# Patient Record
Sex: MALE | Race: WHITE | NOT HISPANIC OR LATINO | Employment: OTHER | URBAN - METROPOLITAN AREA
[De-identification: names, ages, dates, MRNs, and addresses within clinical notes are randomized per-mention and may not be internally consistent; named-entity substitution may affect disease eponyms.]

---

## 2017-12-13 ENCOUNTER — HISTORICAL (OUTPATIENT)
Dept: RADIOLOGY | Facility: HOSPITAL | Age: 67
End: 2017-12-13

## 2017-12-13 LAB
ALBUMIN SERPL-MCNC: 4.3 GM/DL (ref 3.4–5)
ALBUMIN/GLOB SERPL: 1.1 RATIO (ref 1.1–2)
ALP SERPL-CCNC: 120 UNIT/L (ref 46–116)
ALT SERPL-CCNC: 28 UNIT/L (ref 12–78)
APPEARANCE, UA: CLEAR
AST SERPL-CCNC: 20 UNIT/L (ref 15–37)
BACTERIA #/AREA URNS AUTO: NORMAL /HPF
BILIRUB SERPL-MCNC: 0.4 MG/DL (ref 0.2–1)
BILIRUB UR QL STRIP: NEGATIVE
BILIRUBIN DIRECT+TOT PNL SERPL-MCNC: 0.11 MG/DL (ref 0–0.2)
BILIRUBIN DIRECT+TOT PNL SERPL-MCNC: 0.28 MG/DL (ref 0–0.8)
BUN SERPL-MCNC: 14.9 MG/DL (ref 7–18)
CALCIUM SERPL-MCNC: 10 MG/DL (ref 8.5–10.1)
CHLORIDE SERPL-SCNC: 101 MMOL/L (ref 98–107)
CHOLEST SERPL-MCNC: 137 MG/DL (ref 0–200)
CHOLEST/HDLC SERPL: 2.2 {RATIO} (ref 0–5)
CO2 SERPL-SCNC: 29.2 MMOL/L (ref 21–32)
COLOR UR: YELLOW
CREAT SERPL-MCNC: 0.98 MG/DL (ref 0.6–1.3)
ERYTHROCYTE [DISTWIDTH] IN BLOOD BY AUTOMATED COUNT: 14.3 % (ref 11.5–17)
GLOBULIN SER-MCNC: 3.8 GM/DL (ref 2.4–3.5)
GLUCOSE (UA): NEGATIVE
GLUCOSE SERPL-MCNC: 167 MG/DL (ref 74–106)
HCT VFR BLD AUTO: 39.2 % (ref 42–52)
HDLC SERPL-MCNC: 63 MG/DL (ref 40–60)
HGB BLD-MCNC: 13.1 GM/DL (ref 14–18)
HGB UR QL STRIP: NEGATIVE
KETONES UR QL STRIP: NEGATIVE
LDLC SERPL CALC-MCNC: 61 MG/DL (ref 0–129)
LEUKOCYTE ESTERASE UR QL STRIP: NEGATIVE
MCH RBC QN AUTO: 28.1 PG (ref 27–31)
MCHC RBC AUTO-ENTMCNC: 33.5 GM/DL (ref 33–36)
MCV RBC AUTO: 83.9 FL (ref 80–94)
NITRITE UR QL STRIP.AUTO: NEGATIVE
PH UR STRIP: 7 [PH] (ref 5–9)
PLATELET # BLD AUTO: 297 X10(3)/MCL (ref 130–400)
PMV BLD AUTO: 6.9 FL (ref 7.4–10.4)
POTASSIUM SERPL-SCNC: 5.1 MMOL/L (ref 3.5–5.1)
PROT SERPL-MCNC: 8.1 GM/DL (ref 6.4–8.2)
PROT UR QL STRIP: NEGATIVE
PSA SERPL-MCNC: 1.61 NG/ML (ref 0–4)
RBC # BLD AUTO: 4.67 X10(6)/MCL (ref 4.7–6.1)
RBC #/AREA URNS HPF: NORMAL /[HPF]
SODIUM SERPL-SCNC: 139 MMOL/L (ref 136–145)
SP GR UR STRIP: 1.02 (ref 1–1.03)
SQUAMOUS EPITHELIAL, UA: NORMAL
TRIGL SERPL-MCNC: 67 MG/DL
TSH SERPL-ACNC: 1.94 MIU/ML (ref 0.36–3.74)
UROBILINOGEN UR STRIP-ACNC: 0.2
VLDLC SERPL CALC-MCNC: 13 MG/DL
WBC # SPEC AUTO: 7.9 X10(3)/MCL (ref 4.5–11.5)
WBC #/AREA URNS AUTO: NORMAL /[HPF]

## 2018-04-11 ENCOUNTER — HISTORICAL (OUTPATIENT)
Dept: LAB | Facility: HOSPITAL | Age: 68
End: 2018-04-11

## 2018-04-11 LAB
BUN SERPL-MCNC: 13.6 MG/DL (ref 7–18)
CALCIUM SERPL-MCNC: 9 MG/DL (ref 8.5–10.1)
CHLORIDE SERPL-SCNC: 94 MMOL/L (ref 98–107)
CO2 SERPL-SCNC: 29.3 MMOL/L (ref 21–32)
CREAT SERPL-MCNC: 0.97 MG/DL (ref 0.6–1.3)
CREAT/UREA NIT SERPL: 14
EST. AVERAGE GLUCOSE BLD GHB EST-MCNC: 183 MG/DL
GLUCOSE SERPL-MCNC: 179 MG/DL (ref 74–106)
HBA1C MFR BLD: 8 % (ref 4.5–6.2)
POTASSIUM SERPL-SCNC: 5.1 MMOL/L (ref 3.5–5.1)
SODIUM SERPL-SCNC: 132 MMOL/L (ref 136–145)

## 2018-07-27 ENCOUNTER — HISTORICAL (OUTPATIENT)
Dept: RADIOLOGY | Facility: HOSPITAL | Age: 68
End: 2018-07-27

## 2018-12-17 ENCOUNTER — HISTORICAL (OUTPATIENT)
Dept: LAB | Facility: HOSPITAL | Age: 68
End: 2018-12-17

## 2018-12-17 LAB
ALBUMIN SERPL-MCNC: 3.6 GM/DL (ref 3.4–5)
ALBUMIN/GLOB SERPL: 0.8 RATIO (ref 1.1–2)
ALP SERPL-CCNC: 88 UNIT/L (ref 46–116)
ALT SERPL-CCNC: 25 UNIT/L (ref 12–78)
AST SERPL-CCNC: 16 UNIT/L (ref 15–37)
BILIRUB SERPL-MCNC: 0.2 MG/DL (ref 0.2–1)
BILIRUBIN DIRECT+TOT PNL SERPL-MCNC: 0.09 MG/DL (ref 0–0.2)
BILIRUBIN DIRECT+TOT PNL SERPL-MCNC: 0.11 MG/DL (ref 0–0.8)
BUN SERPL-MCNC: 26.8 MG/DL (ref 7–18)
CALCIUM SERPL-MCNC: 9.7 MG/DL (ref 8.5–10.1)
CHLORIDE SERPL-SCNC: 95 MMOL/L (ref 98–107)
CO2 SERPL-SCNC: 27.7 MMOL/L (ref 21–32)
CREAT SERPL-MCNC: 1.28 MG/DL (ref 0.6–1.3)
EST. AVERAGE GLUCOSE BLD GHB EST-MCNC: 263 MG/DL
GLOBULIN SER-MCNC: 4.5 GM/DL (ref 2.4–3.5)
GLUCOSE SERPL-MCNC: 500 MG/DL (ref 74–106)
HBA1C MFR BLD: 10.8 % (ref 4.5–6.2)
POTASSIUM SERPL-SCNC: 4.7 MMOL/L (ref 3.5–5.1)
PROT SERPL-MCNC: 8.1 GM/DL (ref 6.4–8.2)
SODIUM SERPL-SCNC: 132 MMOL/L (ref 136–145)

## 2019-09-17 ENCOUNTER — HISTORICAL (OUTPATIENT)
Dept: LAB | Facility: HOSPITAL | Age: 69
End: 2019-09-17

## 2019-09-17 LAB
ALBUMIN SERPL-MCNC: 4.1 GM/DL (ref 3.4–5)
ALBUMIN/GLOB SERPL: 1.1 RATIO (ref 1.1–2)
ALP SERPL-CCNC: 78 UNIT/L (ref 46–116)
ALT SERPL-CCNC: 18 UNIT/L (ref 12–78)
AST SERPL-CCNC: 16 UNIT/L (ref 15–37)
BILIRUB SERPL-MCNC: 0.4 MG/DL (ref 0.2–1)
BILIRUBIN DIRECT+TOT PNL SERPL-MCNC: 0.1 MG/DL (ref 0–0.2)
BILIRUBIN DIRECT+TOT PNL SERPL-MCNC: 0.3 MG/DL (ref 0–0.8)
BUN SERPL-MCNC: 15.4 MG/DL (ref 7–18)
CALCIUM SERPL-MCNC: 9.2 MG/DL (ref 8.5–10.1)
CHLORIDE SERPL-SCNC: 98 MMOL/L (ref 98–107)
CHOLEST SERPL-MCNC: 146 MG/DL (ref 0–200)
CHOLEST/HDLC SERPL: 2.9 {RATIO} (ref 0–5)
CO2 SERPL-SCNC: 28.1 MMOL/L (ref 21–32)
CREAT SERPL-MCNC: 1.02 MG/DL (ref 0.6–1.3)
EST. AVERAGE GLUCOSE BLD GHB EST-MCNC: 169 MG/DL
GLOBULIN SER-MCNC: 3.7 GM/DL (ref 2.4–3.5)
GLUCOSE SERPL-MCNC: 175 MG/DL (ref 74–106)
HBA1C MFR BLD: 7.5 % (ref 4.5–6.2)
HDLC SERPL-MCNC: 51 MG/DL (ref 40–60)
LDLC SERPL CALC-MCNC: 83 MG/DL (ref 0–129)
POTASSIUM SERPL-SCNC: 4.9 MMOL/L (ref 3.5–5.1)
PROT SERPL-MCNC: 7.8 GM/DL (ref 6.4–8.2)
SODIUM SERPL-SCNC: 136 MMOL/L (ref 136–145)
TRIGL SERPL-MCNC: 58 MG/DL
VLDLC SERPL CALC-MCNC: 12 MG/DL

## 2020-12-14 ENCOUNTER — HISTORICAL (OUTPATIENT)
Dept: ADMINISTRATIVE | Facility: HOSPITAL | Age: 70
End: 2020-12-14

## 2020-12-14 LAB
ALBUMIN SERPL-MCNC: 3.9 GM/DL (ref 3.4–4.8)
ALBUMIN/GLOB SERPL: 1.1 RATIO (ref 1.1–2)
ALP SERPL-CCNC: 57 UNIT/L (ref 40–150)
ALT SERPL-CCNC: 12 UNIT/L (ref 0–55)
APPEARANCE, UA: CLEAR
AST SERPL-CCNC: 15 UNIT/L (ref 5–34)
BACTERIA SPEC CULT: NORMAL
BILIRUB SERPL-MCNC: 0.3 MG/DL
BILIRUB UR QL STRIP: NEGATIVE
BILIRUBIN DIRECT+TOT PNL SERPL-MCNC: 0.1 MG/DL (ref 0–0.5)
BILIRUBIN DIRECT+TOT PNL SERPL-MCNC: 0.2 MG/DL (ref 0–0.8)
BUN SERPL-MCNC: 21.7 MG/DL (ref 8.4–25.7)
CALCIUM SERPL-MCNC: 9.5 MG/DL (ref 8.8–10)
CHLORIDE SERPL-SCNC: 101 MMOL/L (ref 98–107)
CHOLEST SERPL-MCNC: 140 MG/DL
CHOLEST/HDLC SERPL: 2 {RATIO} (ref 0–5)
CO2 SERPL-SCNC: 23 MMOL/L (ref 23–31)
COLOR UR: YELLOW
CREAT SERPL-MCNC: 0.96 MG/DL (ref 0.73–1.18)
ERYTHROCYTE [DISTWIDTH] IN BLOOD BY AUTOMATED COUNT: 13.4 % (ref 11.5–17)
EST. AVERAGE GLUCOSE BLD GHB EST-MCNC: 145.6 MG/DL
GLOBULIN SER-MCNC: 3.5 GM/DL (ref 2.4–3.5)
GLUCOSE (UA): NEGATIVE
GLUCOSE SERPL-MCNC: 104 MG/DL (ref 82–115)
HBA1C MFR BLD: 6.7 %
HCT VFR BLD AUTO: 41.6 % (ref 42–52)
HDLC SERPL-MCNC: 57 MG/DL (ref 35–60)
HGB BLD-MCNC: 13.7 GM/DL (ref 14–18)
HGB UR QL STRIP: NEGATIVE
KETONES UR QL STRIP: NEGATIVE
LDLC SERPL CALC-MCNC: 67 MG/DL (ref 50–140)
LEUKOCYTE ESTERASE UR QL STRIP: NEGATIVE
MCH RBC QN AUTO: 28.1 PG (ref 27–31)
MCHC RBC AUTO-ENTMCNC: 32.9 GM/DL (ref 33–36)
MCV RBC AUTO: 85.4 FL (ref 80–94)
NITRITE UR QL STRIP: NEGATIVE
PH UR STRIP: 5.5 [PH] (ref 5–9)
PLATELET # BLD AUTO: 312 X10(3)/MCL (ref 130–400)
PMV BLD AUTO: 9.4 FL (ref 9.4–12.4)
POTASSIUM SERPL-SCNC: 5.1 MMOL/L (ref 3.5–5.1)
PROT SERPL-MCNC: 7.4 GM/DL (ref 5.8–7.6)
PROT UR QL STRIP: NEGATIVE
PSA SERPL-MCNC: 1.19 NG/ML
RBC # BLD AUTO: 4.87 X10(6)/MCL (ref 4.7–6.1)
RBC #/AREA URNS HPF: NORMAL /[HPF]
SODIUM SERPL-SCNC: 134 MMOL/L (ref 136–145)
SP GR UR STRIP: 1.02 (ref 1–1.03)
SQUAMOUS EPITHELIAL, UA: NORMAL
TRIGL SERPL-MCNC: 80 MG/DL (ref 34–140)
TSH SERPL-ACNC: 2.35 UIU/ML (ref 0.35–4.94)
UROBILINOGEN UR STRIP-ACNC: 0.2
VLDLC SERPL CALC-MCNC: 16 MG/DL
WBC # SPEC AUTO: 9.8 X10(3)/MCL (ref 4.5–11.5)
WBC #/AREA URNS HPF: NORMAL /[HPF]

## 2021-01-11 ENCOUNTER — HISTORICAL (OUTPATIENT)
Dept: ADMINISTRATIVE | Facility: HOSPITAL | Age: 71
End: 2021-01-11

## 2021-01-11 LAB
ALBUMIN SERPL-MCNC: 4.5 GM/DL (ref 3.4–4.8)
ALBUMIN/GLOB SERPL: 1.5 RATIO (ref 1.1–2)
ALP SERPL-CCNC: 63 UNIT/L (ref 40–150)
ALT SERPL-CCNC: 14 UNIT/L (ref 0–55)
AST SERPL-CCNC: 13 UNIT/L (ref 5–34)
BILIRUB SERPL-MCNC: 0.4 MG/DL
BILIRUBIN DIRECT+TOT PNL SERPL-MCNC: 0.2 MG/DL (ref 0–0.5)
BILIRUBIN DIRECT+TOT PNL SERPL-MCNC: 0.2 MG/DL (ref 0–0.8)
BUN SERPL-MCNC: 28.7 MG/DL (ref 8.4–25.7)
CALCIUM SERPL-MCNC: 9.3 MG/DL (ref 8.8–10)
CHLORIDE SERPL-SCNC: 100 MMOL/L (ref 98–107)
CO2 SERPL-SCNC: 25 MMOL/L (ref 23–31)
CREAT SERPL-MCNC: 1.49 MG/DL (ref 0.73–1.18)
GLOBULIN SER-MCNC: 3.1 GM/DL (ref 2.4–3.5)
GLUCOSE SERPL-MCNC: 165 MG/DL (ref 82–115)
POTASSIUM SERPL-SCNC: 5.5 MMOL/L (ref 3.5–5.1)
PROT SERPL-MCNC: 7.6 GM/DL (ref 5.8–7.6)
SODIUM SERPL-SCNC: 135 MMOL/L (ref 136–145)

## 2021-01-21 ENCOUNTER — HISTORICAL (OUTPATIENT)
Dept: ADMINISTRATIVE | Facility: HOSPITAL | Age: 71
End: 2021-01-21

## 2021-01-21 LAB
ALBUMIN SERPL-MCNC: 3.7 GM/DL (ref 3.4–4.8)
ALBUMIN/GLOB SERPL: 1.3 RATIO (ref 1.1–2)
ALP SERPL-CCNC: 49 UNIT/L (ref 40–150)
ALT SERPL-CCNC: 13 UNIT/L (ref 0–55)
AST SERPL-CCNC: 13 UNIT/L (ref 5–34)
BILIRUB SERPL-MCNC: 0.4 MG/DL
BILIRUBIN DIRECT+TOT PNL SERPL-MCNC: 0.2 MG/DL (ref 0–0.5)
BILIRUBIN DIRECT+TOT PNL SERPL-MCNC: 0.2 MG/DL (ref 0–0.8)
BUN SERPL-MCNC: 18 MG/DL (ref 8.4–25.7)
CALCIUM SERPL-MCNC: 8.8 MG/DL (ref 8.8–10)
CHLORIDE SERPL-SCNC: 88 MMOL/L (ref 98–107)
CO2 SERPL-SCNC: 25 MMOL/L (ref 23–31)
CREAT SERPL-MCNC: 1.01 MG/DL (ref 0.73–1.18)
GLOBULIN SER-MCNC: 2.8 GM/DL (ref 2.4–3.5)
GLUCOSE SERPL-MCNC: 126 MG/DL (ref 82–115)
POTASSIUM SERPL-SCNC: 5.3 MMOL/L (ref 3.5–5.1)
PROT SERPL-MCNC: 6.5 GM/DL (ref 5.8–7.6)
SODIUM SERPL-SCNC: 120 MMOL/L (ref 136–145)

## 2022-02-09 ENCOUNTER — HISTORICAL (OUTPATIENT)
Dept: LAB | Facility: HOSPITAL | Age: 72
End: 2022-02-09

## 2022-02-09 LAB
ALBUMIN SERPL-MCNC: 4.1 G/DL (ref 3.4–4.8)
ALBUMIN/GLOB SERPL: 1.2 {RATIO} (ref 1.1–2)
ALP SERPL-CCNC: 79 U/L (ref 40–150)
ALT SERPL-CCNC: 19 U/L (ref 0–55)
APPEARANCE, UA: CLEAR
AST SERPL-CCNC: 17 U/L (ref 5–34)
BACTERIA SPEC CULT: NORMAL
BILIRUB SERPL-MCNC: 0.4 MG/DL
BILIRUB UR QL STRIP: NEGATIVE
BILIRUBIN DIRECT+TOT PNL SERPL-MCNC: 0.2 (ref 0–0.5)
BILIRUBIN DIRECT+TOT PNL SERPL-MCNC: 0.2 (ref 0–0.8)
BUN SERPL-MCNC: 16.8 MG/DL (ref 8.4–25.7)
CALCIUM SERPL-MCNC: 10 MG/DL (ref 8.7–10.5)
CHLORIDE SERPL-SCNC: 97 MMOL/L (ref 98–107)
CHOLEST SERPL-MCNC: 165 MG/DL
CHOLEST/HDLC SERPL: 3 {RATIO} (ref 0–5)
CO2 SERPL-SCNC: 27 MMOL/L (ref 23–31)
COLOR UR: YELLOW
CREAT SERPL-MCNC: 1.34 MG/DL (ref 0.73–1.18)
ERYTHROCYTE [DISTWIDTH] IN BLOOD BY AUTOMATED COUNT: 12.8 % (ref 11.5–17)
EST. AVERAGE GLUCOSE BLD GHB EST-MCNC: >355.1 MG/DL
GLOBULIN SER-MCNC: 3.3 G/DL (ref 2.4–3.5)
GLUCOSE (UA): >=1000
GLUCOSE SERPL-MCNC: 358 MG/DL (ref 82–115)
HBA1C MFR BLD: >14 %
HCT VFR BLD AUTO: 38.8 % (ref 42–52)
HDLC SERPL-MCNC: 54 MG/DL (ref 35–60)
HEMOLYSIS INTERF INDEX SERPL-ACNC: 1
HGB BLD-MCNC: 12.6 G/DL (ref 14–18)
HGB UR QL STRIP: NEGATIVE
ICTERIC INTERF INDEX SERPL-ACNC: 0
KETONES UR QL STRIP: NEGATIVE
LDLC SERPL CALC-MCNC: 77 MG/DL (ref 50–140)
LEUKOCYTE ESTERASE UR QL STRIP: NEGATIVE
LIPEMIC INTERF INDEX SERPL-ACNC: 3
MCH RBC QN AUTO: 27.3 PG (ref 27–31)
MCHC RBC AUTO-ENTMCNC: 32.5 G/DL (ref 33–36)
MCV RBC AUTO: 84.2 FL (ref 80–94)
NITRITE UR QL STRIP: NEGATIVE
PH UR STRIP: 5.5 [PH] (ref 5–9)
PLATELET # BLD AUTO: 250 10*3/UL (ref 130–400)
PMV BLD AUTO: 10.5 FL (ref 9.4–12.4)
POTASSIUM SERPL-SCNC: 4.9 MMOL/L (ref 3.5–5.1)
PROT SERPL-MCNC: 7.4 G/DL (ref 5.8–7.6)
PROT UR QL STRIP: NEGATIVE
RBC # BLD AUTO: 4.61 10*6/UL (ref 4.7–6.1)
RBC #/AREA URNS HPF: NORMAL /[HPF] (ref 0–2)
SODIUM SERPL-SCNC: 135 MMOL/L (ref 136–145)
SP GR UR STRIP: 1.01 (ref 1–1.03)
SQUAMOUS EPITHELIAL, UA: NORMAL
TRIGL SERPL-MCNC: 170 MG/DL (ref 34–140)
TSH SERPL-ACNC: 1.99 M[IU]/L (ref 0.35–4.94)
UROBILINOGEN UR STRIP-ACNC: 0.2
VLDLC SERPL CALC-MCNC: 34 MG/DL
WBC # SPEC AUTO: 8.5 10*3/UL (ref 4.5–11.5)
WBC #/AREA URNS HPF: NORMAL /[HPF] (ref 0–2)

## 2022-02-16 ENCOUNTER — HISTORICAL (OUTPATIENT)
Dept: LAB | Facility: HOSPITAL | Age: 72
End: 2022-02-16

## 2022-08-09 ENCOUNTER — LAB VISIT (OUTPATIENT)
Dept: LAB | Facility: HOSPITAL | Age: 72
End: 2022-08-09
Attending: FAMILY MEDICINE
Payer: COMMERCIAL

## 2022-08-09 DIAGNOSIS — N50.89 CHYLOCELE OF TUNICA VAGINALIS: ICD-10-CM

## 2022-08-09 DIAGNOSIS — L98.9 FIBROHISTIOCYTIC PROLIFERATION OF THE SKIN: ICD-10-CM

## 2022-08-09 DIAGNOSIS — E78.5 HYPERLIPIDEMIA, UNSPECIFIED HYPERLIPIDEMIA TYPE: ICD-10-CM

## 2022-08-09 DIAGNOSIS — D64.9 ANEMIA, UNSPECIFIED TYPE: Primary | ICD-10-CM

## 2022-08-09 DIAGNOSIS — I10 ESSENTIAL HYPERTENSION, MALIGNANT: ICD-10-CM

## 2022-08-09 LAB
ALBUMIN SERPL-MCNC: 4.2 GM/DL (ref 3.4–4.8)
ALBUMIN/GLOB SERPL: 1 RATIO (ref 1.1–2)
ALP SERPL-CCNC: 78 UNIT/L (ref 40–150)
ALT SERPL-CCNC: 17 UNIT/L (ref 0–55)
AST SERPL-CCNC: 14 UNIT/L (ref 5–34)
BILIRUBIN DIRECT+TOT PNL SERPL-MCNC: 0.5 MG/DL
BUN SERPL-MCNC: 22.4 MG/DL (ref 8.4–25.7)
CALCIUM SERPL-MCNC: 10.3 MG/DL (ref 8.8–10)
CHLORIDE SERPL-SCNC: 97 MMOL/L (ref 98–107)
CO2 SERPL-SCNC: 28 MMOL/L (ref 23–31)
CREAT SERPL-MCNC: 1.52 MG/DL (ref 0.73–1.18)
EST. AVERAGE GLUCOSE BLD GHB EST-MCNC: ABNORMAL MG/DL
GFR SERPLBLD CREATININE-BSD FMLA CKD-EPI: 48 MLS/MIN/1.73/M2
GLOBULIN SER-MCNC: 4.1 GM/DL (ref 2.4–3.5)
GLUCOSE SERPL-MCNC: 406 MG/DL (ref 82–115)
HBA1C MFR BLD: >14 %
POTASSIUM SERPL-SCNC: 4.9 MMOL/L (ref 3.5–5.1)
PROT SERPL-MCNC: 8.3 GM/DL (ref 5.8–7.6)
SODIUM SERPL-SCNC: 137 MMOL/L (ref 136–145)

## 2022-08-09 PROCEDURE — 36415 COLL VENOUS BLD VENIPUNCTURE: CPT

## 2022-08-09 PROCEDURE — 80053 COMPREHEN METABOLIC PANEL: CPT

## 2022-08-09 PROCEDURE — 83036 HEMOGLOBIN GLYCOSYLATED A1C: CPT

## 2022-11-04 ENCOUNTER — LAB VISIT (OUTPATIENT)
Dept: LAB | Facility: HOSPITAL | Age: 72
End: 2022-11-04
Attending: FAMILY MEDICINE
Payer: COMMERCIAL

## 2022-11-04 DIAGNOSIS — K59.00 CONSTIPATION, UNSPECIFIED CONSTIPATION TYPE: ICD-10-CM

## 2022-11-04 DIAGNOSIS — D64.9 ANEMIA, UNSPECIFIED TYPE: Primary | ICD-10-CM

## 2022-11-04 DIAGNOSIS — E78.5 HYPERLIPIDEMIA, UNSPECIFIED HYPERLIPIDEMIA TYPE: ICD-10-CM

## 2022-11-04 DIAGNOSIS — N50.89 SCROTAL MASS: ICD-10-CM

## 2022-11-04 DIAGNOSIS — N52.9 IMPOTENCE: ICD-10-CM

## 2022-11-04 LAB
ALBUMIN SERPL-MCNC: 4.2 GM/DL (ref 3.4–4.8)
ALBUMIN/GLOB SERPL: 1.1 RATIO (ref 1.1–2)
ALP SERPL-CCNC: 73 UNIT/L (ref 40–150)
ALT SERPL-CCNC: 12 UNIT/L (ref 0–55)
AST SERPL-CCNC: 14 UNIT/L (ref 5–34)
BILIRUBIN DIRECT+TOT PNL SERPL-MCNC: 0.5 MG/DL
BUN SERPL-MCNC: 19.8 MG/DL (ref 8.4–25.7)
CALCIUM SERPL-MCNC: 9.7 MG/DL (ref 8.8–10)
CHLORIDE SERPL-SCNC: 104 MMOL/L (ref 98–107)
CO2 SERPL-SCNC: 22 MMOL/L (ref 23–31)
CREAT SERPL-MCNC: 1.34 MG/DL (ref 0.73–1.18)
EST. AVERAGE GLUCOSE BLD GHB EST-MCNC: 323.5 MG/DL
GFR SERPLBLD CREATININE-BSD FMLA CKD-EPI: 56 MLS/MIN/1.73/M2
GLOBULIN SER-MCNC: 3.8 GM/DL (ref 2.4–3.5)
GLUCOSE SERPL-MCNC: 313 MG/DL (ref 82–115)
HBA1C MFR BLD: 12.9 %
POTASSIUM SERPL-SCNC: 4.8 MMOL/L (ref 3.5–5.1)
PROT SERPL-MCNC: 8 GM/DL (ref 5.8–7.6)
SODIUM SERPL-SCNC: 139 MMOL/L (ref 136–145)

## 2022-11-04 PROCEDURE — 36415 COLL VENOUS BLD VENIPUNCTURE: CPT

## 2022-11-04 PROCEDURE — 83036 HEMOGLOBIN GLYCOSYLATED A1C: CPT

## 2022-11-04 PROCEDURE — 80053 COMPREHEN METABOLIC PANEL: CPT

## 2024-04-18 ENCOUNTER — HOSPITAL ENCOUNTER (EMERGENCY)
Facility: HOSPITAL | Age: 74
Discharge: HOME | End: 2024-04-18
Attending: FAMILY MEDICINE
Payer: MEDICARE

## 2024-04-18 VITALS
WEIGHT: 178 LBS | DIASTOLIC BLOOD PRESSURE: 96 MMHG | RESPIRATION RATE: 20 BRPM | BODY MASS INDEX: 26.98 KG/M2 | OXYGEN SATURATION: 98 % | SYSTOLIC BLOOD PRESSURE: 162 MMHG | TEMPERATURE: 98 F | HEART RATE: 90 BPM | HEIGHT: 68 IN

## 2024-04-18 DIAGNOSIS — M54.9 BACK PAIN, UNSPECIFIED BACK LOCATION, UNSPECIFIED BACK PAIN LATERALITY, UNSPECIFIED CHRONICITY: Primary | ICD-10-CM

## 2024-04-18 PROCEDURE — 99283 EMERGENCY DEPT VISIT LOW MDM: CPT | Mod: 25

## 2024-04-18 RX ORDER — DICLOFENAC SODIUM 50 MG/1
50 TABLET, DELAYED RELEASE ORAL 3 TIMES DAILY
Qty: 15 TABLET | Refills: 0 | Status: SHIPPED | OUTPATIENT
Start: 2024-04-18 | End: 2024-04-23
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
## 2024-05-13 ENCOUNTER — HOSPITAL ENCOUNTER (INPATIENT)
Facility: HOSPITAL | Age: 74
LOS: 7 days | Discharge: NURSING, REHAB OR LONG-TERM CARE | DRG: 637 | End: 2024-05-21
Attending: FAMILY MEDICINE | Admitting: STUDENT IN AN ORGANIZED HEALTH CARE EDUCATION/TRAINING PROGRAM
Payer: MEDICARE

## 2024-05-13 DIAGNOSIS — R07.9 CHEST PAIN: ICD-10-CM

## 2024-05-13 DIAGNOSIS — E86.0 DEHYDRATION: ICD-10-CM

## 2024-05-13 DIAGNOSIS — R53.1 WEAKNESS: ICD-10-CM

## 2024-05-13 DIAGNOSIS — E11.649 HYPOGLYCEMIA ASSOCIATED WITH DIABETES: Primary | ICD-10-CM

## 2024-05-13 DIAGNOSIS — J98.8 RESPIRATORY INFECTION: ICD-10-CM

## 2024-05-13 LAB
ALBUMIN SERPL-MCNC: 3.4 G/DL (ref 3.4–4.8)
ALBUMIN/GLOB SERPL: 0.8 RATIO (ref 1.1–2)
ALP SERPL-CCNC: 56 UNIT/L (ref 40–150)
ALT SERPL-CCNC: 16 UNIT/L (ref 0–55)
AMPHET UR QL SCN: NEGATIVE
AST SERPL-CCNC: 46 UNIT/L (ref 5–34)
BACTERIA #/AREA URNS AUTO: NORMAL /HPF
BARBITURATE SCN PRESENT UR: NEGATIVE
BASOPHILS # BLD AUTO: 0.04 X10(3)/MCL
BASOPHILS NFR BLD AUTO: 0.2 %
BENZODIAZ UR QL SCN: NEGATIVE
BILIRUB SERPL-MCNC: 0.3 MG/DL
BILIRUB UR QL STRIP.AUTO: NEGATIVE
BUN SERPL-MCNC: 23.7 MG/DL (ref 8.4–25.7)
CALCIUM SERPL-MCNC: 9.4 MG/DL (ref 8.8–10)
CANNABINOIDS UR QL SCN: NEGATIVE
CHLORIDE SERPL-SCNC: 105 MMOL/L (ref 98–107)
CLARITY UR: CLEAR
CO2 SERPL-SCNC: 24 MMOL/L (ref 23–31)
COCAINE UR QL SCN: NEGATIVE
COLOR UR AUTO: YELLOW
CREAT SERPL-MCNC: 1.17 MG/DL (ref 0.73–1.18)
CRP SERPL HS-MCNC: 169 MG/L
EOSINOPHIL # BLD AUTO: 0 X10(3)/MCL (ref 0–0.9)
EOSINOPHIL NFR BLD AUTO: 0 %
ERYTHROCYTE [DISTWIDTH] IN BLOOD BY AUTOMATED COUNT: 13.6 % (ref 11.5–17)
EST. AVERAGE GLUCOSE BLD GHB EST-MCNC: 125.5 MG/DL
FLUAV AG UPPER RESP QL IA.RAPID: NOT DETECTED
FLUBV AG UPPER RESP QL IA.RAPID: NOT DETECTED
GFR SERPLBLD CREATININE-BSD FMLA CKD-EPI: >60 ML/MIN/1.73/M2
GLOBULIN SER-MCNC: 4.3 GM/DL (ref 2.4–3.5)
GLUCOSE SERPL-MCNC: 126 MG/DL (ref 70–110)
GLUCOSE SERPL-MCNC: 28 MG/DL (ref 82–115)
GLUCOSE UR QL STRIP: 500
HBA1C MFR BLD: 6 %
HCT VFR BLD AUTO: 34.6 % (ref 42–52)
HGB BLD-MCNC: 11.3 G/DL (ref 14–18)
HGB UR QL STRIP: ABNORMAL
IMM GRANULOCYTES # BLD AUTO: 0.04 X10(3)/MCL (ref 0–0.04)
IMM GRANULOCYTES NFR BLD AUTO: 0.2 %
KETONES UR QL STRIP: NEGATIVE
LEUKOCYTE ESTERASE UR QL STRIP: NEGATIVE
LYMPHOCYTES # BLD AUTO: 1.45 X10(3)/MCL (ref 0.6–4.6)
LYMPHOCYTES NFR BLD AUTO: 8.3 %
MAGNESIUM SERPL-MCNC: 2.01 MG/DL (ref 1.6–2.6)
MCH RBC QN AUTO: 28.1 PG (ref 27–31)
MCHC RBC AUTO-ENTMCNC: 32.7 G/DL (ref 33–36)
MCV RBC AUTO: 86.1 FL (ref 80–94)
MONOCYTES # BLD AUTO: 0.99 X10(3)/MCL (ref 0.1–1.3)
MONOCYTES NFR BLD AUTO: 5.7 %
NEUTROPHILS # BLD AUTO: 14.88 X10(3)/MCL (ref 2.1–9.2)
NEUTROPHILS NFR BLD AUTO: 85.6 %
NITRITE UR QL STRIP: NEGATIVE
OPIATES UR QL SCN: NEGATIVE
PCP UR QL: NEGATIVE
PH UR STRIP: 5.5 [PH]
PH UR: 5.5 [PH] (ref 3–11)
PLATELET # BLD AUTO: 214 X10(3)/MCL (ref 130–400)
PMV BLD AUTO: 9.4 FL (ref 7.4–10.4)
POC CARDIAC TROPONIN I: 0.02 NG/ML (ref 0–0.08)
POCT GLUCOSE: 112 MG/DL (ref 70–110)
POCT GLUCOSE: 115 MG/DL (ref 70–110)
POCT GLUCOSE: 120 MG/DL (ref 70–110)
POCT GLUCOSE: 126 MG/DL (ref 70–110)
POCT GLUCOSE: 126 MG/DL (ref 70–110)
POCT GLUCOSE: 128 MG/DL (ref 70–110)
POCT GLUCOSE: 142 MG/DL (ref 70–110)
POCT GLUCOSE: 184 MG/DL (ref 70–110)
POCT GLUCOSE: 71 MG/DL (ref 70–110)
POCT GLUCOSE: 77 MG/DL (ref 70–110)
POCT GLUCOSE: <20 MG/DL (ref 70–110)
POCT GLUCOSE: >500 MG/DL (ref 70–110)
POTASSIUM SERPL-SCNC: 4.2 MMOL/L (ref 3.5–5.1)
PROT SERPL-MCNC: 7.7 GM/DL (ref 5.8–7.6)
PROT UR QL STRIP: 100
RBC # BLD AUTO: 4.02 X10(6)/MCL (ref 4.7–6.1)
RBC #/AREA URNS AUTO: NORMAL /HPF
RSV A 5' UTR RNA NPH QL NAA+PROBE: NOT DETECTED
SAMPLE: NORMAL
SARS-COV-2 RNA RESP QL NAA+PROBE: NOT DETECTED
SODIUM SERPL-SCNC: 139 MMOL/L (ref 136–145)
SP GR UR STRIP.AUTO: 1.02 (ref 1–1.03)
SPECIFIC GRAVITY, URINE AUTO (.000) (OHS): 1.02 (ref 1–1.03)
SQUAMOUS #/AREA URNS AUTO: NORMAL /HPF
TSH SERPL-ACNC: 1.24 UIU/ML (ref 0.35–4.94)
UROBILINOGEN UR STRIP-ACNC: 1
WBC # SPEC AUTO: 17.4 X10(3)/MCL (ref 4.5–11.5)
WBC #/AREA URNS AUTO: NORMAL /HPF

## 2024-05-13 PROCEDURE — 63600175 PHARM REV CODE 636 W HCPCS: Performed by: PHYSICIAN ASSISTANT

## 2024-05-13 PROCEDURE — 80053 COMPREHEN METABOLIC PANEL: CPT | Performed by: FAMILY MEDICINE

## 2024-05-13 PROCEDURE — 83735 ASSAY OF MAGNESIUM: CPT | Performed by: FAMILY MEDICINE

## 2024-05-13 PROCEDURE — 80307 DRUG TEST PRSMV CHEM ANLYZR: CPT | Performed by: FAMILY MEDICINE

## 2024-05-13 PROCEDURE — 25000003 PHARM REV CODE 250: Performed by: FAMILY MEDICINE

## 2024-05-13 PROCEDURE — G0378 HOSPITAL OBSERVATION PER HR: HCPCS

## 2024-05-13 PROCEDURE — 96361 HYDRATE IV INFUSION ADD-ON: CPT

## 2024-05-13 PROCEDURE — 96374 THER/PROPH/DIAG INJ IV PUSH: CPT

## 2024-05-13 PROCEDURE — 87040 BLOOD CULTURE FOR BACTERIA: CPT | Performed by: FAMILY MEDICINE

## 2024-05-13 PROCEDURE — 84443 ASSAY THYROID STIM HORMONE: CPT | Performed by: FAMILY MEDICINE

## 2024-05-13 PROCEDURE — 0241U COVID/RSV/FLU A&B PCR: CPT | Performed by: FAMILY MEDICINE

## 2024-05-13 PROCEDURE — 93010 ELECTROCARDIOGRAM REPORT: CPT | Mod: ,,, | Performed by: INTERNAL MEDICINE

## 2024-05-13 PROCEDURE — 99285 EMERGENCY DEPT VISIT HI MDM: CPT | Mod: 25

## 2024-05-13 PROCEDURE — 94640 AIRWAY INHALATION TREATMENT: CPT

## 2024-05-13 PROCEDURE — 63600175 PHARM REV CODE 636 W HCPCS: Performed by: STUDENT IN AN ORGANIZED HEALTH CARE EDUCATION/TRAINING PROGRAM

## 2024-05-13 PROCEDURE — 25000003 PHARM REV CODE 250: Performed by: PHYSICIAN ASSISTANT

## 2024-05-13 PROCEDURE — 36415 COLL VENOUS BLD VENIPUNCTURE: CPT | Performed by: FAMILY MEDICINE

## 2024-05-13 PROCEDURE — 83036 HEMOGLOBIN GLYCOSYLATED A1C: CPT | Performed by: PHYSICIAN ASSISTANT

## 2024-05-13 PROCEDURE — 25000242 PHARM REV CODE 250 ALT 637 W/ HCPCS: Performed by: PHYSICIAN ASSISTANT

## 2024-05-13 PROCEDURE — 86141 C-REACTIVE PROTEIN HS: CPT | Performed by: FAMILY MEDICINE

## 2024-05-13 PROCEDURE — 85025 COMPLETE CBC W/AUTO DIFF WBC: CPT | Performed by: FAMILY MEDICINE

## 2024-05-13 PROCEDURE — 96375 TX/PRO/DX INJ NEW DRUG ADDON: CPT

## 2024-05-13 PROCEDURE — 63600175 PHARM REV CODE 636 W HCPCS: Performed by: FAMILY MEDICINE

## 2024-05-13 PROCEDURE — 94760 N-INVAS EAR/PLS OXIMETRY 1: CPT

## 2024-05-13 PROCEDURE — 81001 URINALYSIS AUTO W/SCOPE: CPT | Mod: XB | Performed by: FAMILY MEDICINE

## 2024-05-13 PROCEDURE — 93005 ELECTROCARDIOGRAM TRACING: CPT

## 2024-05-13 PROCEDURE — 96372 THER/PROPH/DIAG INJ SC/IM: CPT | Mod: 59 | Performed by: PHYSICIAN ASSISTANT

## 2024-05-13 PROCEDURE — 36415 COLL VENOUS BLD VENIPUNCTURE: CPT | Mod: 91 | Performed by: PHYSICIAN ASSISTANT

## 2024-05-13 RX ORDER — ALUMINUM HYDROXIDE, MAGNESIUM HYDROXIDE, AND SIMETHICONE 1200; 120; 1200 MG/30ML; MG/30ML; MG/30ML
30 SUSPENSION ORAL 4 TIMES DAILY PRN
Status: DISCONTINUED | OUTPATIENT
Start: 2024-05-13 | End: 2024-05-21 | Stop reason: HOSPADM

## 2024-05-13 RX ORDER — SIMETHICONE 80 MG
1 TABLET,CHEWABLE ORAL 4 TIMES DAILY PRN
Status: DISCONTINUED | OUTPATIENT
Start: 2024-05-13 | End: 2024-05-21 | Stop reason: HOSPADM

## 2024-05-13 RX ORDER — IBUPROFEN 200 MG
16 TABLET ORAL
Status: DISCONTINUED | OUTPATIENT
Start: 2024-05-13 | End: 2024-05-21 | Stop reason: HOSPADM

## 2024-05-13 RX ORDER — HYDROCODONE BITARTRATE AND ACETAMINOPHEN 5; 325 MG/1; MG/1
1 TABLET ORAL EVERY 6 HOURS PRN
Status: DISCONTINUED | OUTPATIENT
Start: 2024-05-13 | End: 2024-05-21 | Stop reason: HOSPADM

## 2024-05-13 RX ORDER — GLUCAGON 1 MG
1 KIT INJECTION
Status: DISCONTINUED | OUTPATIENT
Start: 2024-05-13 | End: 2024-05-21 | Stop reason: HOSPADM

## 2024-05-13 RX ORDER — EMPAGLIFLOZIN 25 MG/1
25 TABLET, FILM COATED ORAL DAILY
COMMUNITY
Start: 2024-04-11

## 2024-05-13 RX ORDER — INSULIN ASPART 100 [IU]/ML
0-5 INJECTION, SOLUTION INTRAVENOUS; SUBCUTANEOUS
Status: DISCONTINUED | OUTPATIENT
Start: 2024-05-13 | End: 2024-05-21 | Stop reason: HOSPADM

## 2024-05-13 RX ORDER — ONDANSETRON HYDROCHLORIDE 2 MG/ML
4 INJECTION, SOLUTION INTRAVENOUS EVERY 8 HOURS PRN
Status: DISCONTINUED | OUTPATIENT
Start: 2024-05-13 | End: 2024-05-21 | Stop reason: HOSPADM

## 2024-05-13 RX ORDER — ASPIRIN 81 MG/1
81 TABLET ORAL DAILY
COMMUNITY

## 2024-05-13 RX ORDER — MORPHINE SULFATE 4 MG/ML
2 INJECTION, SOLUTION INTRAMUSCULAR; INTRAVENOUS EVERY 6 HOURS PRN
Status: DISCONTINUED | OUTPATIENT
Start: 2024-05-13 | End: 2024-05-21 | Stop reason: HOSPADM

## 2024-05-13 RX ORDER — NALOXONE HCL 0.4 MG/ML
0.02 VIAL (ML) INJECTION
Status: DISCONTINUED | OUTPATIENT
Start: 2024-05-13 | End: 2024-05-21 | Stop reason: HOSPADM

## 2024-05-13 RX ORDER — GLIMEPIRIDE 2 MG/1
2 TABLET ORAL 2 TIMES DAILY
Status: ON HOLD | COMMUNITY
Start: 2024-04-11 | End: 2024-06-07 | Stop reason: HOSPADM

## 2024-05-13 RX ORDER — LISINOPRIL 2.5 MG/1
5 TABLET ORAL DAILY
Status: DISCONTINUED | OUTPATIENT
Start: 2024-05-13 | End: 2024-05-16

## 2024-05-13 RX ORDER — SODIUM CHLORIDE 0.9 % (FLUSH) 0.9 %
10 SYRINGE (ML) INJECTION
Status: DISCONTINUED | OUTPATIENT
Start: 2024-05-13 | End: 2024-05-21 | Stop reason: HOSPADM

## 2024-05-13 RX ORDER — LISINOPRIL 5 MG/1
5 TABLET ORAL DAILY
Status: ON HOLD | COMMUNITY
Start: 2024-04-11 | End: 2024-06-07 | Stop reason: HOSPADM

## 2024-05-13 RX ORDER — DIPHENHYDRAMINE HYDROCHLORIDE 50 MG/ML
25 INJECTION INTRAMUSCULAR; INTRAVENOUS
Status: COMPLETED | OUTPATIENT
Start: 2024-05-13 | End: 2024-05-13

## 2024-05-13 RX ORDER — IPRATROPIUM BROMIDE AND ALBUTEROL SULFATE 2.5; .5 MG/3ML; MG/3ML
3 SOLUTION RESPIRATORY (INHALATION) EVERY 6 HOURS PRN
Status: DISCONTINUED | OUTPATIENT
Start: 2024-05-13 | End: 2024-05-21 | Stop reason: HOSPADM

## 2024-05-13 RX ORDER — QUINIDINE GLUCONATE 324 MG
1 TABLET, EXTENDED RELEASE ORAL
COMMUNITY
Start: 2024-04-04

## 2024-05-13 RX ORDER — LANOLIN ALCOHOL/MO/W.PET/CERES
1 CREAM (GRAM) TOPICAL DAILY
Status: DISCONTINUED | OUTPATIENT
Start: 2024-05-13 | End: 2024-05-21 | Stop reason: HOSPADM

## 2024-05-13 RX ORDER — TAMSULOSIN HYDROCHLORIDE 0.4 MG/1
0.4 CAPSULE ORAL DAILY
Status: DISCONTINUED | OUTPATIENT
Start: 2024-05-13 | End: 2024-05-21 | Stop reason: HOSPADM

## 2024-05-13 RX ORDER — ACETAMINOPHEN 325 MG/1
650 TABLET ORAL EVERY 4 HOURS PRN
Status: DISCONTINUED | OUTPATIENT
Start: 2024-05-13 | End: 2024-05-21 | Stop reason: HOSPADM

## 2024-05-13 RX ORDER — OCTREOTIDE ACETATE 50 UG/ML
50 INJECTION, SOLUTION INTRAVENOUS; SUBCUTANEOUS
Status: COMPLETED | OUTPATIENT
Start: 2024-05-13 | End: 2024-05-13

## 2024-05-13 RX ORDER — MIRTAZAPINE 7.5 MG/1
15 TABLET, FILM COATED ORAL NIGHTLY
Status: DISCONTINUED | OUTPATIENT
Start: 2024-05-13 | End: 2024-05-21 | Stop reason: HOSPADM

## 2024-05-13 RX ORDER — TAMSULOSIN HYDROCHLORIDE 0.4 MG/1
1 CAPSULE ORAL DAILY
COMMUNITY
Start: 2024-05-06

## 2024-05-13 RX ORDER — BISACODYL 10 MG/1
10 SUPPOSITORY RECTAL DAILY PRN
Status: DISCONTINUED | OUTPATIENT
Start: 2024-05-13 | End: 2024-05-21 | Stop reason: HOSPADM

## 2024-05-13 RX ORDER — DEXTROSE MONOHYDRATE 100 MG/ML
INJECTION, SOLUTION INTRAVENOUS
Status: COMPLETED | OUTPATIENT
Start: 2024-05-13 | End: 2024-05-13

## 2024-05-13 RX ORDER — MIRTAZAPINE 15 MG/1
15 TABLET, FILM COATED ORAL NIGHTLY
COMMUNITY
Start: 2024-05-06

## 2024-05-13 RX ORDER — IBUPROFEN 200 MG
24 TABLET ORAL
Status: DISCONTINUED | OUTPATIENT
Start: 2024-05-13 | End: 2024-05-21 | Stop reason: HOSPADM

## 2024-05-13 RX ORDER — OCTREOTIDE ACETATE 50 UG/ML
50 INJECTION, SOLUTION INTRAVENOUS; SUBCUTANEOUS EVERY 6 HOURS PRN
Status: DISCONTINUED | OUTPATIENT
Start: 2024-05-13 | End: 2024-05-21 | Stop reason: HOSPADM

## 2024-05-13 RX ORDER — POLYETHYLENE GLYCOL 3350 17 G/17G
17 POWDER, FOR SOLUTION ORAL 3 TIMES DAILY PRN
Status: DISCONTINUED | OUTPATIENT
Start: 2024-05-13 | End: 2024-05-21 | Stop reason: HOSPADM

## 2024-05-13 RX ORDER — DEXTROSE MONOHYDRATE AND SODIUM CHLORIDE 5; .9 G/100ML; G/100ML
INJECTION, SOLUTION INTRAVENOUS CONTINUOUS
Status: ACTIVE | OUTPATIENT
Start: 2024-05-13 | End: 2024-05-14

## 2024-05-13 RX ORDER — METFORMIN HYDROCHLORIDE 1000 MG/1
1000 TABLET ORAL 2 TIMES DAILY
COMMUNITY
Start: 2024-04-11

## 2024-05-13 RX ORDER — ONDANSETRON 4 MG/1
8 TABLET, ORALLY DISINTEGRATING ORAL EVERY 8 HOURS PRN
Status: DISCONTINUED | OUTPATIENT
Start: 2024-05-13 | End: 2024-05-21 | Stop reason: HOSPADM

## 2024-05-13 RX ORDER — DEXTROSE MONOHYDRATE AND SODIUM CHLORIDE 5; .9 G/100ML; G/100ML
INJECTION, SOLUTION INTRAVENOUS CONTINUOUS
Status: DISCONTINUED | OUTPATIENT
Start: 2024-05-13 | End: 2024-05-13

## 2024-05-13 RX ORDER — TALC
9 POWDER (GRAM) TOPICAL NIGHTLY PRN
Status: DISCONTINUED | OUTPATIENT
Start: 2024-05-13 | End: 2024-05-21 | Stop reason: HOSPADM

## 2024-05-13 RX ORDER — LORAZEPAM 2 MG/ML
2 INJECTION INTRAMUSCULAR
Status: COMPLETED | OUTPATIENT
Start: 2024-05-13 | End: 2024-05-13

## 2024-05-13 RX ORDER — ENOXAPARIN SODIUM 100 MG/ML
40 INJECTION SUBCUTANEOUS EVERY 24 HOURS
Status: DISCONTINUED | OUTPATIENT
Start: 2024-05-13 | End: 2024-05-21 | Stop reason: HOSPADM

## 2024-05-13 RX ORDER — ASPIRIN 81 MG/1
81 TABLET ORAL DAILY
Status: DISCONTINUED | OUTPATIENT
Start: 2024-05-13 | End: 2024-05-21 | Stop reason: HOSPADM

## 2024-05-13 RX ADMIN — OCTREOTIDE ACETATE 50 MCG: 50 INJECTION, SOLUTION INTRAVENOUS; SUBCUTANEOUS at 02:05

## 2024-05-13 RX ADMIN — DEXTROSE MONOHYDRATE 125 ML: 100 INJECTION, SOLUTION INTRAVENOUS at 02:05

## 2024-05-13 RX ADMIN — FERROUS SULFATE TAB 325 MG (65 MG ELEMENTAL FE) 1 EACH: 325 (65 FE) TAB at 04:05

## 2024-05-13 RX ADMIN — MIRTAZAPINE 15 MG: 7.5 TABLET, FILM COATED ORAL at 10:05

## 2024-05-13 RX ADMIN — DEXTROSE MONOHYDRATE: 100 INJECTION, SOLUTION INTRAVENOUS at 11:05

## 2024-05-13 RX ADMIN — TAMSULOSIN HYDROCHLORIDE 0.4 MG: 0.4 CAPSULE ORAL at 04:05

## 2024-05-13 RX ADMIN — DEXTROSE AND SODIUM CHLORIDE: 5; 900 INJECTION, SOLUTION INTRAVENOUS at 10:05

## 2024-05-13 RX ADMIN — SODIUM CHLORIDE 1000 ML: 9 INJECTION, SOLUTION INTRAVENOUS at 10:05

## 2024-05-13 RX ADMIN — IPRATROPIUM BROMIDE AND ALBUTEROL SULFATE 3 ML: .5; 3 SOLUTION RESPIRATORY (INHALATION) at 08:05

## 2024-05-13 RX ADMIN — DEXTROSE AND SODIUM CHLORIDE: 5; 900 INJECTION, SOLUTION INTRAVENOUS at 03:05

## 2024-05-13 RX ADMIN — DIPHENHYDRAMINE HYDROCHLORIDE 25 MG: 50 INJECTION INTRAMUSCULAR; INTRAVENOUS at 10:05

## 2024-05-13 RX ADMIN — LORAZEPAM 2 MG: 2 INJECTION INTRAMUSCULAR; INTRAVENOUS at 10:05

## 2024-05-13 RX ADMIN — ASPIRIN 81 MG: 81 TABLET, COATED ORAL at 04:05

## 2024-05-13 RX ADMIN — ENOXAPARIN SODIUM 40 MG: 40 INJECTION SUBCUTANEOUS at 04:05

## 2024-05-13 RX ADMIN — LISINOPRIL 5 MG: 2.5 TABLET ORAL at 04:05

## 2024-05-13 NOTE — ED PROVIDER NOTES
"Encounter Date: 5/13/2024       History     Chief Complaint   Patient presents with    Fall     Pt presents from home per EMS; hx of dementia, per daughter (POA), pt has had 2 falls last night, "talking out of his head, cursing", not walking or standing up; family is requesting psych or nursing home placement; last yr pt was at [facility], however he signed himself out; he is no longer taking his medications; also dx with bipolar      73-year-old gentleman brought in by [facility] ambulance from home according to the daughter  patient has been acting out the past week feels like his dementia is getting worse also has a history of bipolar flu-like this is getting worse to in his urine has been smelling foul unsure if his medical or mental would like to get him evaluated she says in his current condition can not take care of patient at home        Review of patient's allergies indicates:  No Known Allergies  Past Medical History:   Diagnosis Date    Anemia, unspecified     Diabetes mellitus     Hypertension      Past Surgical History:   Procedure Laterality Date    BELOW KNEE AMPUTATION OF LOWER EXTREMITY       No family history on file.  Social History     Tobacco Use    Smoking status: Never    Smokeless tobacco: Never     Review of Systems   Psychiatric/Behavioral:  Positive for agitation and behavioral problems.    All other systems reviewed and are negative.      Physical Exam     Initial Vitals [05/13/24 1002]   BP Pulse Resp Temp SpO2   (!) 145/95 110 20 99.5 °F (37.5 °C) 95 %      MAP       --         Physical Exam    Nursing note and vitals reviewed.  Constitutional: He appears well-developed and well-nourished. He is active.   HENT:   Head: Normocephalic and atraumatic.   Eyes: Conjunctivae, EOM and lids are normal. Pupils are equal, round, and reactive to light.   Neck: Trachea normal and phonation normal. Neck supple. No thyroid mass present.   Normal range of motion.  Cardiovascular:  Normal rate, regular " rhythm, normal heart sounds and normal pulses.           Pulmonary/Chest: Breath sounds normal.   Abdominal: Abdomen is soft. Bowel sounds are normal.   Musculoskeletal:      Cervical back: Normal range of motion and neck supple.      Comments: BKA on the left     Neurological: He is alert. He has normal strength. GCS score is 15. GCS eye subscore is 4. GCS verbal subscore is 5. GCS motor subscore is 6.   Skin: Skin is warm and intact.   Psychiatric: He has a normal mood and affect. His speech is normal and behavior is normal. Judgment and thought content normal. Cognition and memory are normal.         ED Course   Procedures  Labs Reviewed   COMPREHENSIVE METABOLIC PANEL - Abnormal; Notable for the following components:       Result Value    Glucose 28 (*)     Protein Total 7.7 (*)     Globulin 4.3 (*)     Albumin/Globulin Ratio 0.8 (*)     AST 46 (*)     All other components within normal limits   HIGH SENSITIVITY CRP - Abnormal; Notable for the following components:    C-Reactive Protein High Sensitivity 169.00 (*)     All other components within normal limits   URINALYSIS, REFLEX TO URINE CULTURE - Abnormal; Notable for the following components:    Protein,  (*)     Glucose,  (*)     Blood, UA Trace-Intact (*)     All other components within normal limits   CBC WITH DIFFERENTIAL - Abnormal; Notable for the following components:    WBC 17.40 (*)     RBC 4.02 (*)     Hgb 11.3 (*)     Hct 34.6 (*)     MCHC 32.7 (*)     Neut # 14.88 (*)     All other components within normal limits   COVID/RSV/FLU A&B PCR - Normal    Narrative:     The Xpert Xpress SARS-CoV-2/FLU/RSV plus is a rapid, multiplexed real-time PCR test intended for the simultaneous qualitative detection and differentiation of SARS-CoV-2, Influenza A, Influenza B, and respiratory syncytial virus (RSV) viral RNA in either nasopharyngeal swab or nasal swab specimens.         DRUG SCREEN, URINE (BEAKER) - Normal    Narrative:     .Cut off  concentrations:    Amphetamines - 1000 ng/ml  Barbiturates - 200 ng/ml  Benzodiazepine - 200 ng/ml  Cannabinoids (THC) - 50 ng/ml  Cocaine - 300 ng/ml  Fentanyl - 1.0 ng/ml  MDMA - 500 ng/ml  Opiates - 300 ng/ml   Phencyclidine (PCP) - 25 ng/ml    Specimen submitted for drug analysis and tested for pH and specific gravity in order to evaluate sample integrity. Suspect tampering if specific gravity is <1.003 and/or pH is not within the range of 4.5 - 8.0  False negatives may result form substances such as bleach added to urine.  False positives may result for the presence of a substance with similar chemical structure to the drug or its metabolite.    This test provides only a PRELIMINARY analytical test result. A more specific alternate chemical method must be used in order to obtain a confirmed analytical result. Gas chromatography/mass spectrometry (GC/MS) is the preferred confirmatory method. Other chemical confirmation methods are available. Clinical consideration and professional judgement should be applied to any drug of abuse test result, particularly when preliminary positive results are used.    Positive results will be confirmed only at the physicians request. Unconfirmed screening results are to be used only for medical purposes (treatment).        MAGNESIUM - Normal   TSH - Normal   URINALYSIS, MICROSCOPIC - Normal   BLOOD CULTURE [facility]   BLOOD CULTURE [facility]   CBC W/ AUTO DIFFERENTIAL    Narrative:     The following orders were created for panel order CBC Auto Differential.  Procedure                               Abnormality         Status                     ---------                               -----------         ------                     CBC with Differential[[id number]]       Abnormal            Final result                 Please view results for these tests on the individual orders.   TROPONIN ISTAT   POCT TROPONIN     EKG Readings: (Independently Interpreted)   Initial Reading: No STEMI. Rhythm:  Sinus Tachycardia. Heart Rate: 103. Ectopy: No Ectopy. Conduction: Normal. ST Segments: Normal ST Segments. T Waves: Normal. Clinical Impression: Sinus Tachycardia       Imaging Results              CT Head Without Contrast (In process)                      X-Ray Chest 1 View (Final result)  Result time 05/13/24 11:11:13      Final result by eJffrey Gamez MD (05/13/24 11:11:13)                   Impression:      Prominent interstitial markings with no focal opacification.  No acute cardiopulmonary process appreciated.      Electronically signed by: Jeffrey Gamez  Date:    05/13/2024  Time:    11:11               Narrative:    EXAMINATION:  XR CHEST 1 VIEW    CLINICAL HISTORY:  Weakness    TECHNIQUE:  Single view of the chest    COMPARISON:  02/15/2013    FINDINGS:  Prominent interstitial markings with no focal opacification.    The cardiomediastinal silhouette is within normal limits.    No acute osseous abnormality.                                       Medications   LORazepam injection 2 mg (2 mg Intravenous Given 5/13/24 1052)   diphenhydrAMINE injection 25 mg (25 mg Intravenous Given 5/13/24 1052)   sodium chloride 0.9% bolus 1,000 mL 1,000 mL (0 mLs Intravenous Stopped 5/13/24 1304)   dextrose 10 % infusion (0 mL/hr Intravenous Stopped 5/13/24 1304)     Medical Decision Making  73-year-old gentleman brought in by [facility] ambulance from home according to the daughter  patient has been acting out the past week feels like his dementia is getting worse also has a history of bipolar flu-like this is getting worse to in his urine has been smelling foul unsure if his medical or mental would like to get him evaluated she says in his current condition can not take care of patient at home          Amount and/or Complexity of Data Reviewed  Independent Historian: caregiver and spouse  Labs: ordered. Decision-making details documented in ED Course.  Radiology: ordered and independent interpretation  performed.  ECG/medicine tests: ordered and independent interpretation performed.  Discussion of management or test interpretation with external provider(s): Spoke with Dr. Sung will admit to observation hospital    Risk  Prescription drug management.  Decision regarding hospitalization.  Risk Details: Differential diagnosis worsening dementia, hypoglycemia, infection, dehydration               ED Course as of 05/13/24 1329   Mon May 13, 2024   1133 ISTAT Cardiac Troponin I: 0.02 [BL]   1133 Glucose, UA(!): 500 [BL]   1133 NITRITE UA: Negative [BL]   1133 Leukocyte Esterase, UA: Negative [BL]   1133 Amphetamines, Urine: Negative [BL]   1133 Barbituates, Urine: Negative [BL]   1133 Benzodiazepine, Urine: Negative [BL]   1133 Cannabinoids, Urine: Negative [BL]   1133 Cocaine, Urine: Negative [BL]   1133 Opiates, Urine: Negative [BL]   1133 Phencyclidine: Negative [BL]   1133 Leukocyte Esterase, UA: Negative [BL]   1133 NITRITE UA: Negative [BL]   1147 ISTAT Cardiac Troponin I: 0.02 [BL]   1147 Influenza A, Molecular: Not Detected [BL]   1147 Influenza B, Molecular: Not Detected [BL]   1147 RSV Ag by Molecular Method: Not Detected [BL]   1147 SARS-CoV2 (COVID-19) Qualitative PCR: Not Detected [BL]   1155 ISTAT Cardiac Troponin I: 0.02 [BL]   1155 Influenza A, Molecular: Not Detected [BL]   1155 Influenza B, Molecular: Not Detected [BL]   1155 RSV Ag by Molecular Method: Not Detected [BL]   1155 SARS-CoV2 (COVID-19) Qualitative PCR: Not Detected [BL]   1155 NITRITE UA: Negative [BL]   1155 Leukocyte Esterase, UA: Negative [BL]   1304 ISTAT Cardiac Troponin I: 0.02 [BL]   1304 Glucose(!!): 28 [BL]   1304 BUN: 23.7 [BL]   1304 Creatinine: 1.17 [BL]   1304 CO2: 24 [BL]   1304 Chloride: 105 [BL]   1304 WBC(!): 17.40 [BL]   1304 CRP, High Sensitivity(!): 169.00 [BL]      ED Course User Index  [BL] Joshua Lazaro MD                           Clinical Impression:  Final diagnoses:  [R53.1] Weakness  [E11.649]  Hypoglycemia associated with diabetes (Primary)  [E86.0] Dehydration  [J98.8] Respiratory infection          ED Disposition Condition    Observation Stable                Joshua Lazaro MD  05/13/24 6660

## 2024-05-13 NOTE — H&P
"[facility] MEDICINE - H&P ADMISSION NOTE    Patient Name: Josep Angulo  MRN: [id number]  Patient Class: OP- Observation   Admission Date: 5/13/2024   Admitting Physician: CONRADO Service   Attending Physician: ALICE Hilton  Primary Care Provider: Sami Rothman MD  Face-to-Face encounter date: 05/13/2024      CHIEF COMPLAINT     Chief Complaint   Patient presents with    Fall     Pt presents from home per EMS; hx of dementia, per daughter (POA), pt has had 2 falls last night, "talking out of his head, cursing", not walking or standing up; family is requesting psych or nursing home placement; last yr pt was at [facility], however he signed himself out; he is no longer taking his medications; also dx with bipolar      HISTORY OF PRESENTING ILLNESS     Patient is a 73 year old male with a past medical history of HTN, HLD, DM2, iron deficiency, bipolar disorder, insomnia, dementia, and 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 Daughter reports patient was unable to follow commands and falling asleep easily. Upon arrival to the ER, vitals revealed elevated temperature reading and mildly elevated BP. Labs were significant for leukocytosis, hypoglycemia with a glucose of 28, elevated CRP. Patient was given a D10 infusion with improvement of glucose to 126. CXR revealed prominent interstitial markings with no focal opacification, no acute cardiopulmonary process appreciated. CT head revealed no acute intracranial abnormality identified with chronic microvascular ischemic disease. Daughter at bedside brought patient's home medication bottles. Patient had 4 bottles of Metformin 1000mg BID which daughter reports patient only takes once daily and 3 bottles of Glimepiride 2mg BID. Daughter reports she gets help from a nurse who comes to her home to organize the patient's medication therefore she does not " believe he could have taken too much medication. On exam, CBG registered as <20. He did receive a one time dose of Octreotide 50mcg and D5NS at 100cc/hr. Patient is being admitted to hospital medicine service.     PAST MEDICAL HISTORY     Past Medical History:   Diagnosis Date    Anemia, unspecified     Diabetes mellitus     Hypertension      PAST SURGICAL HISTORY     Past Surgical History:   Procedure Laterality Date    BELOW KNEE AMPUTATION OF LOWER EXTREMITY       FAMILY HISTORY     Reviewed and noncontributory to this case    SOCIAL HISTORY     Social History     Socioeconomic History    Marital status:    Tobacco Use    Smoking status: Never    Smokeless tobacco: Never     Patient's screen for food insecurity, housing instability, transportation needs, utility difficulties, and interpersonal safety. Social work consulted for communicating with patient's insurance during admission, obtaining any DME needed prior to discharge, and possible nursing home placement.     HOME MEDICATIONS     Prior to Admission medications    Medication Sig Start Date End Date Taking? Authorizing Provider   ferrous gluconate (FERGON) 240 (27 FE) MG tablet Take 1 tablet by mouth 3 (three) times daily with meals. 4/4/24  Yes Provider, Historical   glimepiride (AMARYL) 2 MG tablet Take 2 mg by mouth 2 (two) times daily. 4/11/24  Yes Provider, Historical   JARDIANCE 25 mg tablet Take 25 mg by mouth once daily. 4/11/24  Yes Provider, Historical   lisinopriL (PRINIVIL,ZESTRIL) 5 MG tablet Take 5 mg by mouth once daily. 4/11/24  Yes Provider, Historical   metFORMIN (GLUCOPHAGE) 1000 MG tablet Take 1,000 mg by mouth 2 (two) times daily. 4/11/24  Yes Provider, Historical   mirtazapine (REMERON) 15 MG tablet Take 15 mg by mouth every evening. 5/6/24  Yes Provider, Historical     ALLERGIES     Patient has no known allergies.    REVIEW OF SYSTEMS     Except as documented above, all other systems reviewed and negative    PHYSICAL EXAM      Vitals:    05/13/24 1341   BP: 122/71   Pulse: 88   Resp:    Temp:         General:  Lethargic, responds to verbal stimuli   Head and neck:  Atraumatic, normocephalic, moist mucous membranes, edentulous  Chest:  Minimal wheezing throughout  Heart:  S1, S2, no appreciable murmur  Abdomen:  Soft, nontender, BS +  MSK:  Warm, no lower extremity edema, no clubbing or cyanosis, left BKA  Neuro:  Alert and oriented x2, moving all extremities with good strength  Integumentary:  No obvious skin rash    ASSESSMENT AND PLAN     Sulfonylurea induced hypoglycemia   Uncontrolled DM2   Hold home Metformin and Glimepiride   Received D10 in ER with improvement of glucose  Received Octreotide 50mcg  Continue D5NS at 100 cc/hr  Q2 and prn CBGs   Hypoglycemic protocol  A1c ordered, most recent was 12.9% on 11/04/2022    Acute metabolic encephalopathy  2/2 hypoglycemia and receiving sedating medications in the ER   See above treatment  Hold sedating medications   CT head revealed no acute intracranial abnormality identified with chronic microvascular ischemic disease    Leukocytosis   UA without UTI   CXR without acute findings     Covid, flu, RSV negative   Repeat labs in AM     HTN  Resume home Lisinopril     BPH  Resume home Flomax    Iron deficiency   Resume home iron supplementation    Bipolar disorder, insomnia, dementia  Resume home Mirtazapine when patient appropriate   Daughter requesting NH placement at [facility] consulted     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   PT/OT eval      DVT prophylaxis:  Lovenox     Admit this patient to observation under my care.  __________________________________________________________________  LABS/MICRO/MEDS/DIAGNOSTICS     LABS  Recent Labs     05/13/24  1023      K 4.2   CHLORIDE 105   CO2 24   BUN 23.7   CREATININE 1.17   GLUCOSE 28*   CALCIUM 9.4   ALKPHOS 56   AST 46*   ALT 16   ALBUMIN 3.4     Recent Labs     05/13/24  1023   WBC 17.40*   RBC 4.02*   HCT 34.6*   MCV  86.1        MICROBIOLOGY  Microbiology Results (last 7 days)       Procedure Component Value Units Date/Time    Blood Culture [[id number]]     Order Status: Sent Specimen: Blood     Blood Culture [[id number]]     Order Status: Sent Specimen: Blood           MEDICATIONS   octreotide  50 mcg Intravenous ED 1 Time      INFUSIONS    DIAGNOSTIC TESTS  CT Head Without Contrast   Final Result      No acute intracranial abnormality identified.  Findings of chronic microvascular ischemic disease.         Electronically signed by: Jeffrey Gamez   Date:    05/13/2024   Time:    13:35      X-Ray Chest 1 View   Final Result      Prominent interstitial markings with no focal opacification.  No acute cardiopulmonary process appreciated.         Electronically signed by: Jeffrey Gamez   Date:    05/13/2024   Time:    11:11           Patient information was obtained from patient, patient's family, past medical records and ER records.   All diagnosis and differential diagnosis have been reviewed; assessment and plan has been documented. I have personally reviewed the labs and test results that are presently available; I have reviewed the patients medication list. I have reviewed the consulting providers response and recommendations. I have reviewed or attempted to review medical records based upon their availability.  All of the patient's questions have been addressed and answered. Patient's is agreeable to the above stated plan. I will continue to monitor closely and make adjustments to medical management as needed.  This note was created using [employer] voice recognition software that occasionally misinterpreted phrases or words.  Please contact me if any questions may rise regarding documentation to clarify verbiage.      ALICE Hilton   Internal Medicine  Department of Hospital Medicine  [facility] - Emergency Dept

## 2024-05-13 NOTE — PLAN OF CARE
Problem: Adult Inpatient Plan of Care  Goal: Plan of Care Review  Outcome: Progressing  Flowsheets (Taken 5/13/2024 1430)  Plan of Care Reviewed With: patient  Goal: Patient-Specific Goal (Individualized)  Outcome: Progressing  Goal: Absence of Hospital-Acquired Illness or Injury  Outcome: Progressing  Intervention: Prevent Skin Injury  Flowsheets (Taken 5/13/2024 1430)  Device Skin Pressure Protection:   pressure points protected   positioning supports utilized   skin-to-device areas padded   skin-to-skin areas padded   tubing/devices free from skin contact   absorbent pad utilized/changed  Intervention: Prevent Infection  Flowsheets (Taken 5/13/2024 1430)  Infection Prevention:   cohorting utilized   personal protective equipment utilized   environmental surveillance performed   rest/sleep promoted   equipment surfaces disinfected   single patient room provided   hand hygiene promoted  Goal: Optimal Comfort and Wellbeing  Outcome: Progressing  Intervention: Monitor Pain and Promote Comfort  Flowsheets (Taken 5/13/2024 1430)  Pain Management Interventions: care clustered  Goal: Readiness for Transition of Care  Outcome: Progressing     Problem: Diabetes Comorbidity  Goal: Blood Glucose Level Within Targeted Range  Outcome: Progressing     Problem: Infection  Goal: Absence of Infection Signs and Symptoms  Outcome: Progressing  Intervention: Prevent or Manage Infection  Flowsheets (Taken 5/13/2024 1430)  Fever Reduction/Comfort Measures:   lightweight clothing   lightweight bedding     Problem: Skin Injury Risk Increased  Goal: Skin Health and Integrity  Outcome: Progressing  Intervention: Optimize Skin Protection  Flowsheets (Taken 5/13/2024 1430)  Pressure Reduction Techniques:   frequent weight shift encouraged   heels elevated off bed   positioned off wounds   pressure points protected   weight shift assistance provided  Pressure Reduction Devices:   foam padding utilized   heel offloading device utilized    positioning supports utilized  Intervention: Promote and Optimize Oral Intake  Flowsheets (Taken 5/13/2024 1430)  Nutrition Interventions: food preferences provided     Problem: Fall Injury Risk  Goal: Absence of Fall and Fall-Related Injury  Outcome: Progressing  Intervention: Identify and Manage Contributors  Flowsheets (Taken 5/13/2024 1430)  Self-Care Promotion: independence encouraged     Problem: Mobility Impairment  Goal: Optimal Mobility  Outcome: Progressing  Intervention: Optimize Mobility  Flowsheets (Taken 5/13/2024 1615)  Activity Management: Rolling - L1  Positioning/Transfer Devices:   pillows   in use   wedge

## 2024-05-14 LAB
ANION GAP SERPL CALC-SCNC: 7 MEQ/L
BASOPHILS # BLD AUTO: 0.04 X10(3)/MCL
BASOPHILS NFR BLD AUTO: 0.4 %
BUN SERPL-MCNC: 16.4 MG/DL (ref 8.4–25.7)
CALCIUM SERPL-MCNC: 8.2 MG/DL (ref 8.8–10)
CHLORIDE SERPL-SCNC: 107 MMOL/L (ref 98–107)
CO2 SERPL-SCNC: 24 MMOL/L (ref 23–31)
CREAT SERPL-MCNC: 1.19 MG/DL (ref 0.73–1.18)
CREAT/UREA NIT SERPL: 14
EOSINOPHIL # BLD AUTO: 0.05 X10(3)/MCL (ref 0–0.9)
EOSINOPHIL NFR BLD AUTO: 0.5 %
ERYTHROCYTE [DISTWIDTH] IN BLOOD BY AUTOMATED COUNT: 13.8 % (ref 11.5–17)
GFR SERPLBLD CREATININE-BSD FMLA CKD-EPI: >60 ML/MIN/1.73/M2
GLUCOSE SERPL-MCNC: 171 MG/DL (ref 82–115)
HCT VFR BLD AUTO: 32.4 % (ref 42–52)
HGB BLD-MCNC: 10.6 G/DL (ref 14–18)
IMM GRANULOCYTES # BLD AUTO: 0.01 X10(3)/MCL (ref 0–0.04)
IMM GRANULOCYTES NFR BLD AUTO: 0.1 %
LYMPHOCYTES # BLD AUTO: 1.48 X10(3)/MCL (ref 0.6–4.6)
LYMPHOCYTES NFR BLD AUTO: 14.1 %
MCH RBC QN AUTO: 28.5 PG (ref 27–31)
MCHC RBC AUTO-ENTMCNC: 32.7 G/DL (ref 33–36)
MCV RBC AUTO: 87.1 FL (ref 80–94)
MONOCYTES # BLD AUTO: 0.62 X10(3)/MCL (ref 0.1–1.3)
MONOCYTES NFR BLD AUTO: 5.9 %
NEUTROPHILS # BLD AUTO: 8.28 X10(3)/MCL (ref 2.1–9.2)
NEUTROPHILS NFR BLD AUTO: 79 %
OHS QRS DURATION: 134 MS
OHS QTC CALCULATION: 463 MS
PLATELET # BLD AUTO: 199 X10(3)/MCL (ref 130–400)
PMV BLD AUTO: 10.4 FL (ref 7.4–10.4)
POCT GLUCOSE: 130 MG/DL (ref 70–110)
POCT GLUCOSE: 149 MG/DL (ref 70–110)
POCT GLUCOSE: 164 MG/DL (ref 70–110)
POCT GLUCOSE: 164 MG/DL (ref 70–110)
POCT GLUCOSE: 206 MG/DL (ref 70–110)
POCT GLUCOSE: 212 MG/DL (ref 70–110)
POCT GLUCOSE: 258 MG/DL (ref 70–110)
POCT GLUCOSE: 258 MG/DL (ref 70–110)
POTASSIUM SERPL-SCNC: 4.7 MMOL/L (ref 3.5–5.1)
RBC # BLD AUTO: 3.72 X10(6)/MCL (ref 4.7–6.1)
SODIUM SERPL-SCNC: 138 MMOL/L (ref 136–145)
WBC # SPEC AUTO: 10.48 X10(3)/MCL (ref 4.5–11.5)

## 2024-05-14 PROCEDURE — 11000001 HC ACUTE MED/SURG PRIVATE ROOM

## 2024-05-14 PROCEDURE — 85025 COMPLETE CBC W/AUTO DIFF WBC: CPT | Performed by: PHYSICIAN ASSISTANT

## 2024-05-14 PROCEDURE — 97535 SELF CARE MNGMENT TRAINING: CPT

## 2024-05-14 PROCEDURE — 36415 COLL VENOUS BLD VENIPUNCTURE: CPT | Performed by: PHYSICIAN ASSISTANT

## 2024-05-14 PROCEDURE — 94760 N-INVAS EAR/PLS OXIMETRY 1: CPT

## 2024-05-14 PROCEDURE — 97165 OT EVAL LOW COMPLEX 30 MIN: CPT

## 2024-05-14 PROCEDURE — 97110 THERAPEUTIC EXERCISES: CPT

## 2024-05-14 PROCEDURE — 80048 BASIC METABOLIC PNL TOTAL CA: CPT | Performed by: PHYSICIAN ASSISTANT

## 2024-05-14 PROCEDURE — 25000003 PHARM REV CODE 250: Performed by: PHYSICIAN ASSISTANT

## 2024-05-14 PROCEDURE — 97530 THERAPEUTIC ACTIVITIES: CPT

## 2024-05-14 PROCEDURE — 97161 PT EVAL LOW COMPLEX 20 MIN: CPT

## 2024-05-14 PROCEDURE — 63600175 PHARM REV CODE 636 W HCPCS: Performed by: PHYSICIAN ASSISTANT

## 2024-05-14 PROCEDURE — 63600175 PHARM REV CODE 636 W HCPCS: Performed by: STUDENT IN AN ORGANIZED HEALTH CARE EDUCATION/TRAINING PROGRAM

## 2024-05-14 PROCEDURE — 96372 THER/PROPH/DIAG INJ SC/IM: CPT | Performed by: STUDENT IN AN ORGANIZED HEALTH CARE EDUCATION/TRAINING PROGRAM

## 2024-05-14 PROCEDURE — 99900035 HC TECH TIME PER 15 MIN (STAT)

## 2024-05-14 RX ORDER — METFORMIN HYDROCHLORIDE 500 MG/1
500 TABLET ORAL 2 TIMES DAILY
Status: DISCONTINUED | OUTPATIENT
Start: 2024-05-14 | End: 2024-05-20

## 2024-05-14 RX ORDER — GUAIFENESIN 100 MG/5ML
200 SOLUTION ORAL EVERY 4 HOURS
Status: DISCONTINUED | OUTPATIENT
Start: 2024-05-14 | End: 2024-05-18

## 2024-05-14 RX ADMIN — INSULIN ASPART 2 UNITS: 100 INJECTION, SOLUTION INTRAVENOUS; SUBCUTANEOUS at 04:05

## 2024-05-14 RX ADMIN — ASPIRIN 81 MG: 81 TABLET, COATED ORAL at 08:05

## 2024-05-14 RX ADMIN — ENOXAPARIN SODIUM 40 MG: 40 INJECTION SUBCUTANEOUS at 04:05

## 2024-05-14 RX ADMIN — TAMSULOSIN HYDROCHLORIDE 0.4 MG: 0.4 CAPSULE ORAL at 08:05

## 2024-05-14 RX ADMIN — GUAIFENESIN 200 MG: 200 SOLUTION ORAL at 02:05

## 2024-05-14 RX ADMIN — GUAIFENESIN 200 MG: 200 SOLUTION ORAL at 05:05

## 2024-05-14 RX ADMIN — LISINOPRIL 5 MG: 2.5 TABLET ORAL at 08:05

## 2024-05-14 RX ADMIN — INSULIN ASPART 3 UNITS: 100 INJECTION, SOLUTION INTRAVENOUS; SUBCUTANEOUS at 11:05

## 2024-05-14 RX ADMIN — METFORMIN HYDROCHLORIDE 500 MG: 500 TABLET ORAL at 09:05

## 2024-05-14 RX ADMIN — MIRTAZAPINE 15 MG: 7.5 TABLET, FILM COATED ORAL at 09:05

## 2024-05-14 RX ADMIN — FERROUS SULFATE TAB 325 MG (65 MG ELEMENTAL FE) 1 EACH: 325 (65 FE) TAB at 08:05

## 2024-05-14 RX ADMIN — GUAIFENESIN 200 MG: 200 SOLUTION ORAL at 09:05

## 2024-05-14 NOTE — PT/OT/SLP PROGRESS
Occupational Therapy  Treatment    Name: Josep Angulo    : 1950 (73 y.o.)  MRN: [id number]           TREATMENT SUMMARY AND RECOMMENDATIONS:      OT Date of Treatment: 24  OT Start Time: 1309  OT Stop Time: 1342  OT Total Time (min): 33 min      Subjective Assessment:   No complaints  Lethargic   x Awake, alert, cooperative  Impulsive    Uncooperative   Flat affect    Agitated  c/o pain    Appropriate  c/o fatigue    Confused x Treated at bedside     Emotionally labile  Treated in gym/dept.      Other:        Therapy Precautions:    Cognitive deficits  Spinal precautions    Collar - hard  Sternal precautions    Collar - soft   TLSO   x Fall risk  LSO    Hip precautions - posterior  Knee immobilizer    Hip precautions - anterior  WBAT    Impaired communication  Partial weightbearing    Oxygen  TTWB    PEG tube  NWB    Visual deficits      Hearing deficits   Other:        Treatment Objectives:     Mobility Training:    Mobility task Assist level Comments    Bed mobility     Transfer     Sit to stands transitions     Functional mobility     Sitting balance     Standing balance  good No LOB noted in standing during toileting. Pt able to tolerate standing over 1-2 min without buckling of RLE.    Other:        ADL Training:    ADL Assist level Comments    Feeding     Grooming/hygiene     Bathing     Upper body dressing     Lower body dressing CGA Able to thread RLE and L stump and manage over hips in standing CGA for balance/safety. Able to priya R sock.    Toileting CGA/min A (+) void (+) BM; pt able to wipe in standing with CGA for balance/safety. Min A for thoroughness    Toilet transfer CGA Sit to stands with bed rail anterior on/off BSC    Adaptive equipment training     Other:         Additional Comments:  T still requesting cough drop; will let RN know.     Assessment: Patient tolerated session well. Pt had positive response to today's treatment. Pt continues to make good progress towards goals. Pt  would continue to benefit from skilled OT services to improve pt's safety and independence with daily occupations, decrease caregiver burden, and reduce pt's risk of falls.     GOALS:   Multidisciplinary Problems       Occupational Therapy Goals          Problem: Occupational Therapy    Goal Priority Disciplines Outcome Interventions   Occupational Therapy Goal     OT, PT/OT Progressing    Description: Goals to be met by: 5/28/24     Patient will increase functional independence with ADLs by performing:    UE Dressing with Modified [place].  LE Dressing with Contact Guard Assistance.  Grooming while seated at sink with Modified [place].  Toileting from bedside commode with Contact Guard Assistance for hygiene and clothing management.   Bathing from  shower chair/bench with Contact Guard Assistance.  Toilet transfer to bedside commode with Contact Guard Assistance.                         Recommendations:     Discharge Equipment Recommendations:  wheelchair     Plan:     Patient to be seen 6 x/week (QD/BID) to address the above listed problems via self-care/home management, therapeutic activities, therapeutic exercises, wheelchair management/training  Plan of Care Expires: 05/28/24  Plan of Care Reviewed with: patient  Revisions made to plan of care: No      Skilled OT Minutes Provided: 33  Communication with Treatment Team:     Equipment recommendations:       At end of treatment, patient remained:   Up in chair     Up in wheelchair in room    In bed    With alarm activated    Bed rails up    Call bell in reach     Family/friends present    Restraints secured properly    In bathroom with CNA/RN notified   x In gym with PT/PTA/tech    Nurse aware    Other:

## 2024-05-14 NOTE — PROGRESS NOTES
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 Daughter reports patient was unable to follow commands and falling asleep easily. Upon arrival to the ER, vitals revealed elevated temperature reading and mildly elevated BP. Labs were significant for leukocytosis, hypoglycemia with a glucose of 28, elevated CRP. Patient was given a D10 infusion with improvement of glucose to 126. CXR revealed prominent interstitial markings with no focal opacification, no acute cardiopulmonary process appreciated. CT head revealed no acute intracranial abnormality identified with chronic microvascular ischemic disease. Daughter at bedside brought patient's home medication bottles. Patient had 4 bottles of Metformin 1000mg BID which daughter reports patient only takes once daily and 3 bottles of Glimepiride 2mg BID. Daughter reports she gets help from a nurse who comes to her home to organize the patient's medication therefore she does not believe he could have taken too much medication. On exam, CBG registered as <20. He did receive a one time dose of Octreotide 50mcg and D5NS at 100cc/hr. Patient is being admitted to hospital medicine service.     Today:  Patient alert and oriented x 4 today. No complaints. Glucose improved today.    OBJECTIVE/PHYSICAL EXAM     VITAL SIGNS (MOST RECENT):  Temp: 98.1 °F (36.7 °C) (05/14/24 1100)  Pulse: 82 (05/14/24 1100)  Resp: 18 (05/14/24 0309)  BP: 137/75 (05/14/24 1100)  SpO2: 97 % (05/14/24 1100) VITAL SIGNS (24 HOUR RANGE):  Temp:  [98.1 °F (36.7 °C)-99.2 °F (37.3 °C)] 98.1 °F (36.7 °C)  Pulse:  [82-96] 82  Resp:  [16-18] 18  SpO2:  [97 %-100 %] 97 %  BP:  (122-151)/() 137/75     GENERAL: In no acute distress, afebrile  HEENT: Atraumatic, normocephalic, moist mucous membranes, edentulous   CHEST: Clear to auscultation bilaterally  HEART: S1, S2, no appreciable murmur  ABDOMEN: Soft, nontender, BS +  MSK: Warm, no lower extremity edema, no clubbing or cyanosis, left BKA  NEUROLOGIC: Alert and oriented x4, moving all extremities with good strength   INTEGUMENTARY: See media  PSYCHIATRY: Appropriate mood and affect     ASSESSMENT/PLAN     Sulfonylurea induced hypoglycemia   Uncontrolled DM2   Hold home Glimepiride   Received D10 in ER   Received Octreotide 50mcg  Stop IV D5NS   Q4 and prn CBGs   Hypoglycemic protocol  A1c 6%   Resume home Metformin at a decreased dose of 500mg BID      Acute metabolic encephalopathy  2/2 hypoglycemia and receiving sedating medications in the ER   See above treatment  Hold sedating medications   CT head revealed no acute intracranial abnormality identified with chronic microvascular ischemic disease     Leukocytosis - resolved   UA without UTI   CXR without acute findings     Covid, flu, RSV negative   Repeat labs in AM      HTN  Continue home Lisinopril      BPH  Continue home Flomax     Iron deficiency   Continue home iron supplementation     Bipolar disorder, insomnia, dementia  Continue home Mirtazapine when patient appropriate   Daughter requesting NH placement at [facility] consulted      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   PT/OT eval       DVT prophylaxis:  Lovenox     Anticipated discharge and disposition: Continue to monitor glucose, continue PT/OT, family requesting NH placement   __________________________________________________________________________    LABS/MICRO/MEDS/DIAGNOSTICS     LABS  Recent Labs     05/13/24  1023 05/14/24  0415    138   K 4.2 4.7   CHLORIDE 105 107   CO2 24 24   BUN 23.7 16.4   CREATININE 1.17 1.19*   GLUCOSE 28* 171*   CALCIUM 9.4 8.2*   ALKPHOS 56  --    AST 46*  --    ALT 16   "--    ALBUMIN 3.4  --      Recent Labs     05/14/24  0415   WBC 10.48   RBC 3.72*   HCT 32.4*   MCV 87.1        MICROBIOLOGY  Microbiology Results (last 7 days)       Procedure Component Value Units Date/Time    Blood Culture [[id number]] Collected: 05/13/24 1436    Order Status: Resulted Specimen: Blood Updated: 05/13/24 1436    Blood Culture [[id number]] Collected: 05/13/24 1436    Order Status: Resulted Specimen: Blood Updated: 05/13/24 1436             MEDICATIONS   aspirin  81 mg Oral Daily    enoxparin  40 mg Subcutaneous Daily    ferrous sulfate  1 tablet Oral Daily    lisinopriL  5 mg Oral Daily    mirtazapine  15 mg Oral QHS    tamsulosin  0.4 mg Oral Daily         INFUSIONS       DIAGNOSTIC TESTS  CT Head Without Contrast   Final Result      No acute intracranial abnormality identified.  Findings of chronic microvascular ischemic disease.         Electronically signed by: Jeffrey Gamez   Date:    05/13/2024   Time:    13:35      X-Ray Chest 1 View   Final Result      Prominent interstitial markings with no focal opacification.  No acute cardiopulmonary process appreciated.         Electronically signed by: Jeffrey Gamez   Date:    05/13/2024   Time:    11:11         No results found for: "EF"     NUTRITION STATUS  Patient meets ASPEN criteria for   malnutrition of   per RD assessment as evidenced by:                       A minimum of two characteristics is recommended for diagnosis of either severe or non-severe malnutrition.     Case related differential diagnoses have been reviewed; assessment and plan has been documented. I have personally reviewed the labs and test results that are currently available; I have reviewed the patients medication list. I have reviewed the consulting providers recommendations. I have reviewed or attempted to review medical records based upon their availability.  All of the patient's and/or family's questions have been addressed and answered to the best of my " ability.  I will continue to monitor closely and make adjustments to medical management as needed.  This document was created using M*Modal Fluency Direct.  Transcription errors may have been made.  Please contact me if any questions may rise regarding documentation to clarify transcription.      ALICE Hilton   Internal Medicine  Department of Hospital Medicine [facility]

## 2024-05-14 NOTE — PLAN OF CARE
Problem: Adult Inpatient Plan of Care  Goal: Plan of Care Review  Outcome: Progressing  Flowsheets (Taken 5/14/2024 0800)  Plan of Care Reviewed With: patient  Goal: Patient-Specific Goal (Individualized)  Outcome: Progressing  Flowsheets (Taken 5/14/2024 0800)  Individualized Care Needs: monitor cbgs, 1:1 sitting  Anxieties, Fears or Concerns: none voiced  Goal: Absence of Hospital-Acquired Illness or Injury  Outcome: Progressing  Intervention: Prevent Skin Injury  Flowsheets (Taken 5/14/2024 0800)  Body Position:   position maintained   position changed independently  Skin Protection:   incontinence pads utilized   protective footwear used

## 2024-05-14 NOTE — PLAN OF CARE
Problem: Occupational Therapy  Goal: Occupational Therapy Goal  Description: Goals to be met by: 5/28/24     Patient will increase functional independence with ADLs by performing:    UE Dressing with Modified [place].  LE Dressing with Contact Guard Assistance.  Grooming while seated at sink with Modified [place].  Toileting from bedside commode with Contact Guard Assistance for hygiene and clothing management.   Bathing from  shower chair/bench with Contact Guard Assistance.  Toilet transfer to bedside commode with Contact Guard Assistance.    Outcome: Progressing

## 2024-05-14 NOTE — PLAN OF CARE
Problem: Adult Inpatient Plan of Care  Goal: Plan of Care Review  Outcome: Progressing  Flowsheets (Taken 5/14/2024 0339)  Plan of Care Reviewed With: patient  Goal: Patient-Specific Goal (Individualized)  Outcome: Progressing  Goal: Absence of Hospital-Acquired Illness or Injury  Outcome: Progressing  Intervention: Identify and Manage Fall Risk  Flowsheets (Taken 5/14/2024 0339)  Safety Promotion/Fall Prevention:   bed alarm set   nonskid shoes/socks when out of bed   instructed to call staff for mobility   side rails raised x 3  Intervention: Prevent Skin Injury  Flowsheets (Taken 5/14/2024 0339)  Body Position: turned  Skin Protection: incontinence pads utilized  Device Skin Pressure Protection:   tubing/devices free from skin contact   pressure points protected  Intervention: Prevent and Manage VTE (Venous Thromboembolism) Risk  Flowsheets (Taken 5/14/2024 0339)  VTE Prevention/Management: bleeding precations maintained  Intervention: Prevent Infection  Flowsheets (Taken 5/14/2024 0339)  Infection Prevention:   cohorting utilized   environmental surveillance performed   equipment surfaces disinfected   hand hygiene promoted   personal protective equipment utilized     Problem: Infection  Goal: Absence of Infection Signs and Symptoms  Outcome: Progressing  Intervention: Prevent or Manage Infection  Flowsheets (Taken 5/14/2024 0339)  Fever Reduction/Comfort Measures:   lightweight bedding   lightweight clothing  Infection Management: aseptic technique maintained  Isolation Precautions:   precautions maintained   protective     Problem: Skin Injury Risk Increased  Goal: Skin Health and Integrity  Outcome: Progressing  Intervention: Optimize Skin Protection  Flowsheets (Taken 5/14/2024 0339)  Skin Protection: incontinence pads utilized  Activity Management: Rolling - L1  Head of Bed (HOB) Positioning: HOB elevated

## 2024-05-14 NOTE — PLAN OF CARE
Problem: Physical Therapy  Goal: Physical Therapy Goal  Description: Goals to be met by: Discharge      Patient will increase functional independence with mobility by performin. Sit to stand transfer with Supervision  2. Bed to chair transfer with Supervision using Rolling Walker  3. Wheelchair propulsion x 300 feet with Modified [place] using bilateral uppper extremities.   4. Gait to be completed with goal set if prosthesis is present.     Outcome: Progressing      10

## 2024-05-14 NOTE — PLAN OF CARE
[facility]  Initial Discharge Assessment       Primary Care Provider: Sami Rothman MD    Admission Diagnosis: Dehydration [E86.0]  Respiratory infection [J98.8]  Weakness [R53.1]  Hypoglycemia associated with diabetes [E11.649]    Admission Date: 5/13/2024  Expected Discharge Date:     Transition of Care Barriers: None    Payor: [employer] [employer] / Plan: [employer] / Product Type: Medicare Advantage /     Extended Emergency Contact Information  Primary Emergency Contact: nellie mosqueda  Mobile Phone: [phone]  Relation: Daughter  Preferred language: English   needed? No    Discharge Plan A: New Nursing Home placement - halfway care facility  Discharge Plan B: Home with family, Home Health    No Pharmacies Listed    Initial Assessment (most recent)       Adult Discharge Assessment - 05/14/24 1608          Discharge Assessment    Assessment Type Discharge Planning Assessment     Confirmed/corrected address, phone number and insurance Yes     Confirmed Demographics Correct on Facesheet     Source of Information patient;family     If unable to respond/provide information was family/caregiver contacted? Yes     Contact Name/Number Nellie Mosqueda daughter      Communicated CIELO with patient/caregiver Date not available/Unable to determine     Reason For Admission DM uncontroll weakness     People in Home alone     Facility Arrived From: home     Do you expect to return to your current living situation? No     Do you have help at home or someone to help you manage your care at home? No     Prior to hospitilization cognitive status: Not Oriented to Place     Current cognitive status: Not Oriented to Person     Walking or Climbing Stairs Difficulty yes     Walking or Climbing Stairs transferring difficulty, dependent     Dressing/Bathing Difficulty yes     Dressing/Bathing bathing difficulty, assistance 1 person     Home  Accessibility wheelchair accessible     Home Layout Able to live on 1st floor     Equipment Currently Used at Home bedside commode;walker, rolling     Readmission within 30 days? No     Patient currently being followed by outpatient case management? No     Do you currently have service(s) that help you manage your care at home? No     Do you take prescription medications? No     Do you have prescription coverage? Yes     Coverage [employer]     Do you have any problems affording any of your prescribed medications? TBD     Is the patient taking medications as prescribed? yes     Who is going to help you get home at discharge? Nellie Mosqueda daughter      How do you get to doctors appointments? family or friend will provide     Are you on dialysis? No     Do you take coumadin? No     Discharge Plan A New Nursing Home placement - senior living care facility     Discharge Plan B Home with family;Home Health     DME Needed Upon Discharge  none     Discharge Plan discussed with: Adult children     Transition of Care Barriers None        Physical Activity    On average, how many days per week do you engage in moderate to strenuous exercise (like a brisk walk)? 0 days     On average, how many minutes do you engage in exercise at this level? 0 min        Financial Resource Strain    How hard is it for you to pay for the very basics like food, housing, medical care, and heating? Not hard at all        Housing Stability    In the last 12 months, was there a time when you were not able to pay the mortgage or rent on time? No     At any time in the past 12 months, were you homeless or living in a shelter (including now)? No        Transportation Needs    Has the lack of transportation kept you from medical appointments, meetings, work or from getting things needed for daily living? No        Food Insecurity    Within the past 12 months, you worried that your food would run out before you got the money to buy more.  Never true     Within the past 12 months, the food you bought just didn't last and you didn't have money to get more. Never true        Stress    Do you feel stress - tense, restless, nervous, or anxious, or unable to sleep at night because your mind is troubled all the time - these days? To some extent        Social Isolation    How often do you feel lonely or isolated from those around you?  Rarely        Alcohol Use    Q1: How often do you have a drink containing alcohol? Never     Q2: How many drinks containing alcohol do you have on a typical day when you are drinking? Patient does not drink     Q3: How often do you have six or more drinks on one occasion? Never        Utilities    In the past 12 months has the electric, gas, oil, or water company threatened to shut off services in your home? No        Health Literacy    How often do you need to have someone help you when you read instructions, pamphlets, or other written material from your doctor or pharmacy? Rarely

## 2024-05-14 NOTE — PT/OT/SLP EVAL
Occupational Therapy   Evaluation    Name: Josep Angulo  MRN: [id number]  Admitting Diagnosis: Hypoglycemia associated with diabetes  Recent Surgery: * No surgery found *      Recommendations:     Discharge Recommendations: Low Intensity Therapy  Discharge Equipment Recommendations:  wheelchair  Barriers to discharge:  Decreased caregiver support    Assessment:     Josep Angulo is a 73 y.o. male with a medical diagnosis of Hypoglycemia associated with diabetes.  He presents with decreased strength and endurance affecting his ADL performance. Pt reports visual deficits/blurriness in L eye. States he typically ambulates with prosthetic on LLE however has lost sock and needs readjustment of prosthetic with . Daughter reports wanting NH placement on [place]. Performance deficits affecting function: weakness, impaired endurance, impaired self care skills, visual deficits, decreased lower extremity function, impaired balance, decreased upper extremity function, decreased safety awareness, impaired functional mobility, gait instability.      Rehab Prognosis: Good; patient would benefit from acute skilled OT services to address these deficits and reach maximum level of function.       Plan:     Patient to be seen 6 x/week (QD/BID) to address the above listed problems via self-care/home management, therapeutic activities, therapeutic exercises, wheelchair management/training  Plan of Care Expires: 05/28/24  Plan of Care Reviewed with: patient    Subjective     Chief Complaint: sore throat   Patient/Family Comments/goals: Pt wanting to be able to return home; daughter wants NH placement     Occupational Profile:  Living Environment: lives with daughter (who works) and ex-wife in  home with 3 steps to enter (+) 1 hand rail   Previous level of function: Pt reports mod (I) with gait and ADLs using LLE prosthetic    Equipment Used at Home: walker, rolling, prosthesis, bath bench  Assistance upon Discharge: family      Pain/Comfort:  Pain Rating 1: 2/10  Location 1: second finger  Pain Rating Post-Intervention 1: 4/10  Location 2: throat  Pain Addressed 2: Nurse notified    Patients cultural, spiritual, Christianity conflicts given the current situation: no    Objective:     Communicated with: RN prior to session.  Patient found supine with bed alarm, oxygen, peripheral IV upon OT entry to room.    General Precautions: Standard, fall, vision impaired  Orthopedic Precautions: N/A  Braces: N/A  Respiratory Status: Nasal cannula, flow 2 L/min - Evaluation completed on room air; pt remained 95-97% throughout session     Occupational Performance:    Bed Mobility:    Patient completed Supine to Sit with contact guard assistance    Functional Mobility/Transfers:  Patient completed Sit <> Stand Transfer with minimum assistance  with  rolling walker   Patient completed Bed <> Chair Transfer using Stand Pivot technique with minimum assistance with rolling walker  Functional Mobility: Did not attempt; pt does not have prosthesis here    Activities of Daily Living:  Upper Body Dressing: stand by assistance   Lower Body Dressing: minimum assistance   Toileting: minimum-moderate assistance     Cognitive/Visual Perceptual:  Cognitive/Psychosocial Skills:     -       Oriented to: Person, Place, Time, and Situation     Physical Exam:  Upper Extremity Range of Motion:     -       Right Upper Extremity: WFL  -       Left Upper Extremity: WFL  Upper Extremity Strength:    -       Right Upper Extremity: WFL  -       Left Upper Extremity: WFL    AMPAC 6 Click ADL:  AMPAC Total Score: 21    Treatment & Education:  Educated on role of OT, therapy schedule, and use of call bell.     Patient left up in chair with all lines intact, call button in reach, and 1:1 sitter present    GOALS:   Multidisciplinary Problems       Occupational Therapy Goals          Problem: Occupational Therapy    Goal Priority Disciplines Outcome Interventions   Occupational  Therapy Goal     OT, PT/OT Progressing    Description: Goals to be met by: 5/28/24     Patient will increase functional independence with ADLs by performing:    UE Dressing with Modified [place].  LE Dressing with Contact Guard Assistance.  Grooming while seated at sink with Modified [place].  Toileting from bedside commode with Contact Guard Assistance for hygiene and clothing management.   Bathing from  shower chair/bench with Contact Guard Assistance.  Toilet transfer to bedside commode with Contact Guard Assistance.                         History:     Past Medical History:   Diagnosis Date    Anemia, unspecified     Diabetes mellitus     Hypertension          Past Surgical History:   Procedure Laterality Date    BELOW KNEE AMPUTATION OF LOWER EXTREMITY         Time Tracking:     OT Date of Treatment: 05/14/24  OT Start Time: 1105  OT Stop Time: 1128  OT Total Time (min): 23 min    Billable Minutes:Evaluation 23 min    5/14/2024

## 2024-05-14 NOTE — PROGRESS NOTES
[facility]  Wound Care    Patient Name:  Josep Angulo   MRN:  [id number]  Date: 5/14/2024  Diagnosis: Hypoglycemia associated with diabetes    History:     Past Medical History:   Diagnosis Date    Anemia, unspecified     Diabetes mellitus     Hypertension        Social History     Socioeconomic History    Marital status:    Tobacco Use    Smoking status: Never    Smokeless tobacco: Never       Precautions:     Allergies as of 05/13/2024    (No Known Allergies)       WO Assessment Details/Treatment     New wound consult obtained for R posterior elbow, R great toe avulsion.   Hx of 2 falls the night prior to admittance noted per daughter's report in ED.   R posterior elbow noted with closed/resurface skin tear flap.  Area ecchymotic, blanchable erythema present.  No drainage, no fluctance present with palpation.  Site left open to air.   R great toenail avulsion noted.  Nail bed appears closed/resurfaced with no open areas present.   Dried blood remains to surface.  Left open to air.  Recommend applying bandaid if site begins to actively drain and change daily.   Due to debility, recommend pressure prevention measures while hospitalized.   Wound care will sign off.   Reconsult if needed.          05/14/24 0829   WOCN Assessment   WOCN Total Time (mins) 15   Visit Date 05/14/24   Consult Type New   WO Speciality Wound   Intervention assessed;chart review   Teaching on-going        Wound 05/13/24 Skin Tear Right posterior Elbow #4   Date First Assessed: 05/13/24   Present on Original Admission: Yes  Primary Wound Type: Skin Tear  Side: Right  Orientation: posterior  Location: Elbow  Wound Number: #4  Is this injury device related?: No   Wound Image    Dressing Appearance Open to air   Drainage Amount None   Appearance Ecchymotic;Closed/resurfaced   Tissue loss description Not applicable   Periwound Area Ecchymotic;Redness  (blanchable erythema)   Wound Edges Undefined   Wound  Length (cm) 0 cm   Wound Width (cm) 0 cm   Wound Depth (cm) 0 cm   Wound Volume (cm^3) 0 cm^3   Wound Surface Area (cm^2) 0 cm^2        Wound 05/13/24 1430 Avulsion Right anterior Toe, first #5   Date First Assessed/Time First Assessed: 05/13/24 1430   Present on Original Admission: Yes  Primary Wound Type: Avulsion  Side: Right  Orientation: anterior  Location: Toe, first  Wound Number: #5  Is this injury device related?: No   Wound Image    Dressing Appearance Open to air;Dried drainage  (dried blood to surface)   Appearance Pink;Red;Closed/resurfaced   Periwound Area Dry;Intact   Care Cleansed with:;Sterile normal saline;Other (see comments)  (open to air)              05/14/2024

## 2024-05-14 NOTE — PT/OT/SLP PROGRESS
Physical Therapy Treatment Note           Name: Josep Angulo    : 1950 (73 y.o.)  MRN: [id number]           TREATMENT SUMMARY AND RECOMMENDATIONS:    PT Received On: 24  PT Start Time: 1343     PT Stop Time: 1410  PT Total Time (min): 27 min     Subjective Assessment:   No complaints  Lethargic   x Awake, alert, cooperative  Uncooperative    Agitated  c/o pain    Appropriate  c/o fatigue    Confused  Treated at bedside     Emotionally labile x Treated in gym/dept.    Impulsive  Other:    Flat affect       Therapy Precautions:    Cognitive deficits  Spinal precautions    Collar - hard  Sternal precautions    Collar - soft   TLSO   x Fall risk  LSO    Hip precautions - posterior  Knee immobilizer    Hip precautions - anterior  WBAT    Impaired communication  Partial weightbearing    Oxygen  TTWB    PEG tube  NWB   x Visual deficits R eye  x Other: 1:1, L BKA    Hearing deficits          Treatment Objectives:     Mobility Training:   Assist level Comments    Bed mobility     Transfer MIN/MOD A Squat pivot chair<>WC x 2 reps without AD   Gait     Sit to stand transitions MIN A x 2 From WC to RW   Sitting balance     Standing balance      Wheelchair mobility MIN A 3 x 25 feet using B UE, difficulty turning   Car transfer     Other:          Therapeutic Exercise:   Exercise Sets Reps Comments   Seated B LE EX  20 Hip flexion, hip abd/add, knee ext, R ankle PF/DF; 2# on R LE   Visual tracking exercises  10 Horizontal and vertical                    Additional Comments:  Pt brought outside for tx with good tolerance. PT to progress as able. Working on NH placement     Assessment: Patient tolerated session well.    PT Plan: continue per POC  Revisions made to plan of care: No    GOALS:   Multidisciplinary Problems       Physical Therapy Goals          Problem: Physical Therapy    Goal Priority Disciplines Outcome Goal Variances Interventions   Physical Therapy Goal     PT, PT/OT Progressing      Description: Goals to be met by: Discharge      Patient will increase functional independence with mobility by performin. Sit to stand transfer with Supervision  2. Bed to chair transfer with Supervision using Rolling Walker  3. Wheelchair propulsion x 300 feet with Modified [place] using bilateral uppper extremities.   4. Gait to be completed with goal set if prosthesis is present.                          Skilled PT Minutes Provided: 25   Communication with Treatment Team:     Equipment recommendations:       At end of treatment, patient remained:  x Up in chair     Up in wheelchair in room    In bed   x With alarm activated    Bed rails up   x Call bell in reach     Family/friends present    Restraints secured properly    In bathroom with CNA/RN notified    Nurse aware    In gym with therapist/tech   x Other: 1:1

## 2024-05-14 NOTE — NURSING
Nurses Note -- 4 Eyes      5/13/2024   1430      Skin assessed during: Admit      [] No Altered Skin Integrity Present    []Prevention Measures Documented      [x] Yes- Altered Skin Integrity Present or Discovered   [x] LDA Added if Not in Epic (Describe Wound)   [x] New Altered Skin Integrity was Present on Admit and Documented in LDA   [x] Wound Image Taken    Wound Care Consulted? Yes    Attending Nurse:  Carleen Kaur RN    Second RN/Staff Member:  Susana Villegas RN

## 2024-05-14 NOTE — PLAN OF CARE
Spoke with patient's daughter, Nellie, who stated that she would [facility]. Freedom of choice signed and referral sent via [facility].

## 2024-05-14 NOTE — PT/OT/SLP EVAL
Physical Therapy IP Evaluation     Patient Name: Josep Angulo   MRN: [id number]  Recent Surgery: * No surgery found *      Recommendations:     Discharge Recommendations: Low Intensity Therapy   Discharge Equipment Recommendations: none   Barriers to discharge: Increased level of assist, Decreased caregiver support, and Ongoing medical treatment    Assessment:     Josep Angulo is a 73 y.o. male admitted with a medical diagnosis of Hypoglycemia associated with diabetes. He presents with the following impairments/functional limitations: weakness, impaired endurance, impaired functional mobility, impaired cognition, decreased safety awareness. Pt states he typically ambulates with prosthetic on LLE however has lost sock and needs readjustment of prosthetic with . Daughter reports wanting NH placement on [place]. 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    Rehab Prognosis: Fair; patient would benefit from acute PT services to address these deficits and reach maximum level of function.    Plan:     During this hospitalization, patient to be seen  (5-6 days per week, 1-2 times per day) to address the above listed problems via gait training, therapeutic activities, therapeutic exercises, wheelchair management/training    Plan of Care Expires: 05/28/24    Subjective     Chief Complaint: Cough  Patient Comments/Goals: wants to go back home, does not want to go back to [facility].   Pain/Comfort:  Pain Rating 1: 0/10  Pain Rating Post-Intervention 1: 0/10    Social History:  Living Environment: Patient lives with their daughter in a single story home   Prior Level of Function: Prior to admission, patient was modified independent  Equipment Used at Home: prosthesis, walker, rolling  DME owned (not currently used): none  Assistance Upon Discharge: facility  staff    Objective:     Communicated with Nursing prior to session. Patient found HOB elevated with peripheral IV, bed alarm, Other (comments) (1:1) upon PT entry to room.    General Precautions: Standard, fall, vision impaired   Orthopedic Precautions: N/A   Braces: N/A    Respiratory Status: Room air 96% saturation.     Exams:  Cognition: Patient is oriented to Person, Place, Time, Situation  RLE ROM: WNL  RLE Strength: WNL  LLE ROM: WNL  BKA  LLE Strength: WNL  BKA  Gross Motor Coordination: abnormal.     Functional Mobility:  Gait belt applied - Yes  Bed Mobility  Rolling Left: stand by assistance  Rolling Right: stand by assistance  Supine to Sit: contact guard assistance for trunk management  Sit to Supine: contact guard assistance for LE management  Transfers  Sit to Stand: minimum assistance and of 2 persons with rolling walker  Bed to Chair: minimum assistance and of 2 persons with rolling walker using Stand Pivot  Gait  Unable without prosthesis present   Balance  Sitting: stand by assistance and contact guard assistance  Standing: minimum assistance and of 2 persons  Wheelchair Propulsion: Pt propelled Standard wheelchair x 25 feet on Level tile with  Bilateral upper extremity with Minimal Assistance.     Therapeutic Activities and Exercises:   Patient educated on role of acute care PT and PT POC, safety while in hospital including calling nurse for mobility, and call light usage  Patient educated about importance of OOB mobility and remaining up in chair most of the day.  1:1 in room  Pt is safe to transfer with staff using RW, gait belt and MIN A.     AM-PAC 6 CLICK MOBILITY  Total Score:14    Patient left up in chair with all lines intact, call button in reach, RN notified, and CNA present.    GOALS:   Multidisciplinary Problems       Physical Therapy Goals          Problem: Physical Therapy    Goal Priority Disciplines Outcome Goal Variances Interventions   Physical Therapy Goal     PT, PT/OT  Progressing     Description: Goals to be met by: Discharge      Patient will increase functional independence with mobility by performin. Sit to stand transfer with Supervision  2. Bed to chair transfer with Supervision using Rolling Walker  3. Wheelchair propulsion x 300 feet with Modified [place] using bilateral uppper extremities.   4. Gait to be completed with goal set if prosthesis is present.                          History:     Past Medical History:   Diagnosis Date    Anemia, unspecified     Diabetes mellitus     Hypertension        Past Surgical History:   Procedure Laterality Date    BELOW KNEE AMPUTATION OF LOWER EXTREMITY         Time Tracking:     PT Received On: 24  PT Start Time: 1105  PT Stop Time: 1128  PT Total Time (min): 23 min     Billable Minutes: Evaluation low complexity     2024

## 2024-05-15 LAB
POCT GLUCOSE: 130 MG/DL (ref 70–110)
POCT GLUCOSE: 130 MG/DL (ref 70–110)
POCT GLUCOSE: 152 MG/DL (ref 70–110)
POCT GLUCOSE: 155 MG/DL (ref 70–110)
POCT GLUCOSE: 88 MG/DL (ref 70–110)
POCT GLUCOSE: 93 MG/DL (ref 70–110)

## 2024-05-15 PROCEDURE — 97535 SELF CARE MNGMENT TRAINING: CPT

## 2024-05-15 PROCEDURE — 94760 N-INVAS EAR/PLS OXIMETRY 1: CPT

## 2024-05-15 PROCEDURE — 25000003 PHARM REV CODE 250: Performed by: PHYSICIAN ASSISTANT

## 2024-05-15 PROCEDURE — 97116 GAIT TRAINING THERAPY: CPT | Mod: CQ

## 2024-05-15 PROCEDURE — 63600175 PHARM REV CODE 636 W HCPCS: Performed by: PHYSICIAN ASSISTANT

## 2024-05-15 PROCEDURE — 97110 THERAPEUTIC EXERCISES: CPT

## 2024-05-15 PROCEDURE — 11000001 HC ACUTE MED/SURG PRIVATE ROOM

## 2024-05-15 PROCEDURE — 97530 THERAPEUTIC ACTIVITIES: CPT | Mod: CQ

## 2024-05-15 PROCEDURE — 97110 THERAPEUTIC EXERCISES: CPT | Mod: CQ

## 2024-05-15 PROCEDURE — 99900035 HC TECH TIME PER 15 MIN (STAT)

## 2024-05-15 RX ADMIN — ASPIRIN 81 MG: 81 TABLET, COATED ORAL at 08:05

## 2024-05-15 RX ADMIN — METFORMIN HYDROCHLORIDE 500 MG: 500 TABLET ORAL at 09:05

## 2024-05-15 RX ADMIN — GUAIFENESIN 200 MG: 200 SOLUTION ORAL at 11:05

## 2024-05-15 RX ADMIN — FERROUS SULFATE TAB 325 MG (65 MG ELEMENTAL FE) 1 EACH: 325 (65 FE) TAB at 08:05

## 2024-05-15 RX ADMIN — MIRTAZAPINE 15 MG: 7.5 TABLET, FILM COATED ORAL at 09:05

## 2024-05-15 RX ADMIN — GUAIFENESIN 200 MG: 200 SOLUTION ORAL at 01:05

## 2024-05-15 RX ADMIN — TAMSULOSIN HYDROCHLORIDE 0.4 MG: 0.4 CAPSULE ORAL at 08:05

## 2024-05-15 RX ADMIN — GUAIFENESIN 200 MG: 200 SOLUTION ORAL at 05:05

## 2024-05-15 RX ADMIN — METFORMIN HYDROCHLORIDE 500 MG: 500 TABLET ORAL at 08:05

## 2024-05-15 RX ADMIN — ENOXAPARIN SODIUM 40 MG: 40 INJECTION SUBCUTANEOUS at 05:05

## 2024-05-15 RX ADMIN — LISINOPRIL 5 MG: 2.5 TABLET ORAL at 08:05

## 2024-05-15 RX ADMIN — GUAIFENESIN 200 MG: 200 SOLUTION ORAL at 09:05

## 2024-05-15 NOTE — PROGRESS NOTES
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 Daughter reports patient was unable to follow commands and falling asleep easily. Upon arrival to the ER, vitals revealed elevated temperature reading and mildly elevated BP. Labs were significant for leukocytosis, hypoglycemia with a glucose of 28, elevated CRP. Patient was given a D10 infusion with improvement of glucose to 126. CXR revealed prominent interstitial markings with no focal opacification, no acute cardiopulmonary process appreciated. CT head revealed no acute intracranial abnormality identified with chronic microvascular ischemic disease. Daughter at bedside brought patient's home medication bottles. Patient had 4 bottles of Metformin 1000mg BID which daughter reports patient only takes once daily and 3 bottles of Glimepiride 2mg BID. Daughter reports she gets help from a nurse who comes to her home to organize the patient's medication therefore she does not believe he could have taken too much medication. On exam, CBG registered as <20. He did receive a one time dose of Octreotide 50mcg and D5NS at 100cc/hr. Patient is being admitted to hospital medicine service.     Today:  No reported complaints today. Tolerating Metformin.     OBJECTIVE/PHYSICAL EXAM     VITAL SIGNS (MOST RECENT):  Temp: 98.5 °F (36.9 °C) (05/15/24 1058)  Pulse: 86 (05/15/24 1058)  Resp: 18 (05/15/24 1058)  BP: 138/77 (05/15/24 1058)  SpO2: (!) 94 % (05/15/24 1058) VITAL SIGNS (24 HOUR RANGE):  Temp:  [97.5 °F (36.4 °C)-98.7 °F (37.1 °C)] 98.5 °F (36.9 °C)  Pulse:  [] 86  Resp:  [18] 18  SpO2:  [93 %-95 %] 94 %  BP: (118-139)/(62-77) 138/77      GENERAL: In no acute distress, afebrile  HEENT: Atraumatic, normocephalic, moist mucous membranes, edentulous   CHEST: Clear to auscultation bilaterally  HEART: S1, S2, no appreciable murmur  ABDOMEN: Soft, nontender, BS +  MSK: Warm, no lower extremity edema, no clubbing or cyanosis, left BKA  NEUROLOGIC: Alert and oriented x4, moving all extremities with good strength   INTEGUMENTARY: See media  PSYCHIATRY: Appropriate mood and affect     ASSESSMENT/PLAN     Sulfonylurea induced hypoglycemia   Uncontrolled DM2   Hold home Glimepiride   Received D10 in ER   Received Octreotide 50mcg  Q4 and prn CBGs   Hypoglycemic protocol  A1c 6%   Continue Metformin at a decreased dose of 500mg BID      Acute metabolic encephalopathy - resolved   2/2 hypoglycemia and receiving sedating medications in the ER   See above treatment  Hold sedating medications   CT head revealed no acute intracranial abnormality identified with chronic microvascular ischemic disease     Leukocytosis - resolved   UA without UTI   CXR without acute findings     Covid, flu, RSV negative      HTN  Continue home Lisinopril      BPH  Continue home Flomax     Iron deficiency   Continue home iron supplementation     Bipolar disorder, insomnia, dementia  Continue home Mirtazapine when patient appropriate   Daughter requesting NH placement at [facility] consulted      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   PT/OT eval       DVT prophylaxis:  Lovenox     Anticipated discharge and disposition: Continue to monitor glucose, continue PT/OT, family requesting NH placement   __________________________________________________________________________    LABS/MICRO/MEDS/DIAGNOSTICS     LABS  Recent Labs     05/13/24  1023 05/14/24  0415    138   K 4.2 4.7   CHLORIDE 105 107   CO2 24 24   BUN 23.7 16.4   CREATININE 1.17 1.19*   GLUCOSE 28* 171*   CALCIUM 9.4 8.2*   ALKPHOS 56  --    AST 46*  --    ALT 16  --    ALBUMIN 3.4  --      Recent Labs      "05/14/24  0415   WBC 10.48   RBC 3.72*   HCT 32.4*   MCV 87.1        MICROBIOLOGY  Microbiology Results (last 7 days)       Procedure Component Value Units Date/Time    Blood Culture [[id number]]  (Normal) Collected: 05/13/24 1436    Order Status: Completed Specimen: Blood Updated: 05/14/24 1800     Blood Culture No Growth At 24 Hours    Blood Culture [[id number]]  (Normal) Collected: 05/13/24 1436    Order Status: Completed Specimen: Blood Updated: 05/14/24 1800     Blood Culture No Growth At 24 Hours             MEDICATIONS   aspirin  81 mg Oral Daily    enoxparin  40 mg Subcutaneous Daily    ferrous sulfate  1 tablet Oral Daily    guaiFENesin 100 mg/5 ml  200 mg Oral Q4H    lisinopriL  5 mg Oral Daily    metFORMIN  500 mg Oral BID    mirtazapine  15 mg Oral QHS    tamsulosin  0.4 mg Oral Daily         INFUSIONS       DIAGNOSTIC TESTS  CT Head Without Contrast   Final Result      No acute intracranial abnormality identified.  Findings of chronic microvascular ischemic disease.         Electronically signed by: Jeffrey Gamez   Date:    05/13/2024   Time:    13:35      X-Ray Chest 1 View   Final Result      Prominent interstitial markings with no focal opacification.  No acute cardiopulmonary process appreciated.         Electronically signed by: Jeffrey Gamez   Date:    05/13/2024   Time:    11:11         No results found for: "EF"     NUTRITION STATUS  Patient meets ASPEN criteria for   malnutrition of   per RD assessment as evidenced by:                       A minimum of two characteristics is recommended for diagnosis of either severe or non-severe malnutrition.     Case related differential diagnoses have been reviewed; assessment and plan has been documented. I have personally reviewed the labs and test results that are currently available; I have reviewed the patients medication list. I have reviewed the consulting providers recommendations. I have reviewed or attempted to review medical records " based upon their availability.  All of the patient's and/or family's questions have been addressed and answered to the best of my ability.  I will continue to monitor closely and make adjustments to medical management as needed.  This document was created using M*Modal Fluency Direct.  Transcription errors may have been made.  Please contact me if any questions may rise regarding documentation to clarify transcription.      ALICE Hilton   Internal Medicine  Department of Hospital Medicine [facility]

## 2024-05-15 NOTE — PT/OT/SLP PROGRESS
Physical Therapy Treatment Note           Name: Josep Angulo    : 1950 (73 y.o.)  MRN: [id number]           TREATMENT SUMMARY AND RECOMMENDATIONS:    PT Received On: 05/15/24  PT Start Time: 1100     PT Stop Time: 1125  PT Total Time (min): 25 min     Subjective Assessment:  x No complaints  Lethargic   x Awake, alert, cooperative  Uncooperative    Agitated  c/o pain    Appropriate  c/o fatigue    Confused  Treated at bedside     Emotionally labile  Treated in gym/dept.    Impulsive  Other:    Flat affect       Therapy Precautions:    Cognitive deficits  Spinal precautions    Collar - hard  Sternal precautions    Collar - soft   TLSO   x Fall risk  LSO    Hip precautions - posterior  Knee immobilizer    Hip precautions - anterior  WBAT    Impaired communication  Partial weightbearing    Oxygen  TTWB    PEG tube  NWB    Visual deficits  Other:    Hearing deficits          Treatment Objectives:     Mobility Training:   Assist level Comments    Bed mobility     Transfer Anay Bed-WC with RW    Gait     Sit to stand transitions Anay Sit-stand 5 reps x 2 with B UE use   Sitting balance     Standing balance      Wheelchair mobility CGA WC mobility with B UE use 100 ft    Car transfer     Other:          Therapeutic Exercise:   Exercise Sets Reps Comments   B LE exer sitting  1 20 In all planes to promote muscle strength and ROM                          Additional Comments:  No issues noted     Assessment: Patient tolerated session well.    PT Plan: continue  Revisions made to plan of care: No    GOALS:   Multidisciplinary Problems       Physical Therapy Goals          Problem: Physical Therapy    Goal Priority Disciplines Outcome Goal Variances Interventions   Physical Therapy Goal     PT, PT/OT Progressing     Description: Goals to be met by: Discharge      Patient will increase functional independence with mobility by performin. Sit to stand transfer with Supervision  2. Bed to chair transfer  with Supervision using Rolling Walker  3. Wheelchair propulsion x 300 feet with Modified [place] using bilateral uppper extremities.   4. Gait to be completed with goal set if prosthesis is present.                          Skilled PT Minutes Provided: 25   Communication with Treatment Team:     Equipment recommendations:       At end of treatment, patient remained:   Up in chair     Up in wheelchair in room    In bed    With alarm activated    Bed rails up    Call bell in reach     Family/friends present    Restraints secured properly    In bathroom with CNA/RN notified    Nurse aware   x In gym with therapist/tech    Other:

## 2024-05-15 NOTE — PT/OT/SLP PROGRESS
Occupational Therapy  Treatment    Name: Josep Angulo    : 1950 (73 y.o.)  MRN: [id number]           TREATMENT SUMMARY AND RECOMMENDATIONS:      OT Date of Treatment: 05/15/24  OT Start Time: 1147  OT Stop Time: 1202  OT Total Time (min): 15 min      Subjective Assessment:   No complaints  Lethargic   x Awake, alert, cooperative  Impulsive    Uncooperative   Flat affect    Agitated  c/o pain    Appropriate  c/o fatigue    Confused  Treated at bedside     Emotionally labile x Treated in gym/dept.      Other:        Therapy Precautions:    Cognitive deficits  Spinal precautions    Collar - hard  Sternal precautions    Collar - soft   TLSO   x Fall risk  LSO    Hip precautions - posterior  Knee immobilizer    Hip precautions - anterior  WBAT    Impaired communication  Partial weightbearing    Oxygen  TTWB    PEG tube  NWB    Visual deficits      Hearing deficits   Other:        Treatment Objectives:     Mobility Training:    Mobility task Assist level Comments    Bed mobility     Transfer Min-mod A Stand/pivot t/f with RW w/c>BSChair; assist needed for lifting from w/c    Sit to stands transitions     Functional mobility     Sitting balance     Standing balance      Other: w/c mobility SBA/CGA ~200 feet with BUE and RLE use; noted better control with turns today        Therapeutic Exercise:   Exercise Sets Reps Comments   2# dowel 2 10  Shoulder press, chest press, straight arm raises, bicep curls, forward rows, backwards rows                         Additional Comments:  Lunch in room; pt requesting to return to room to eat.     Assessment: Patient tolerated session well.    GOALS:   Multidisciplinary Problems       Occupational Therapy Goals          Problem: Occupational Therapy    Goal Priority Disciplines Outcome Interventions   Occupational Therapy Goal     OT, PT/OT Progressing    Description: Goals to be met by: 24     Patient will increase functional independence with ADLs by performing:    UE  Dressing with Modified [place].  LE Dressing with Contact Guard Assistance.  Grooming while seated at sink with Modified [place].  Toileting from bedside commode with Contact Guard Assistance for hygiene and clothing management.   Bathing from  shower chair/bench with Contact Guard Assistance.  Toilet transfer to bedside commode with Contact Guard Assistance.                         Recommendations:     Discharge Equipment Recommendations:  wheelchair     Plan:     Patient to be seen 6 x/week (QD/BID) to address the above listed problems via self-care/home management, therapeutic activities, therapeutic exercises, wheelchair management/training  Plan of Care Expires: 05/28/24  Plan of Care Reviewed with: patient  Revisions made to plan of care: No      Skilled OT Minutes Provided: 15  Communication with Treatment Team:     Equipment recommendations:       At end of treatment, patient remained:  x Up in chair     Up in wheelchair in room    In bed   x With alarm activated    Bed rails up   x Call bell in reach     Family/friends present    Restraints secured properly    In bathroom with CNA/RN notified    In gym with PT/PTA/tech    Nurse aware    Other:

## 2024-05-15 NOTE — PT/OT/SLP PROGRESS
Physical Therapy Treatment Note           Name: Josep Angulo    : 1950 (73 y.o.)  MRN: [id number]           TREATMENT SUMMARY AND RECOMMENDATIONS:    PT Received On: 05/15/24  PT Start Time: 1330     PT Stop Time: 1355  PT Total Time (min): 25 min     Subjective Assessment:  x No complaints  Lethargic    Awake, alert, cooperative  Uncooperative    Agitated  c/o pain    Appropriate  c/o fatigue    Confused x Treated at bedside     Emotionally labile  Treated in gym/dept.    Impulsive  Other:    Flat affect       Therapy Precautions:    Cognitive deficits  Spinal precautions    Collar - hard  Sternal precautions    Collar - soft   TLSO    Fall risk  LSO    Hip precautions - posterior  Knee immobilizer    Hip precautions - anterior  WBAT    Impaired communication  Partial weightbearing    Oxygen  TTWB    PEG tube  NWB    Visual deficits  Other:    Hearing deficits          Treatment Objectives:     Mobility Training:   Assist level Comments    Bed mobility     Transfer     Gait Anay Amb on firm surface with RW 15 ft    Sit to stand transitions Anay Sit-stand 5 reps with B UE use   Sitting balance     Standing balance      Wheelchair mobility     Car transfer     Other:          Therapeutic Exercise:   Exercise Sets Reps Comments   B LE exer long and short sitting  1 20 In all planes to promote muscle strength and ROM                         Additional Comments:  No issues noted    Assessment: Patient tolerated session well    PT Plan: continue  Revisions made to plan of care: No    GOALS:   Multidisciplinary Problems       Physical Therapy Goals          Problem: Physical Therapy    Goal Priority Disciplines Outcome Goal Variances Interventions   Physical Therapy Goal     PT, PT/OT Progressing     Description: Goals to be met by: Discharge      Patient will increase functional independence with mobility by performin. Sit to stand transfer with Supervision  2. Bed to chair transfer with  Supervision using Rolling Walker  3. Wheelchair propulsion x 300 feet with Modified [place] using bilateral uppper extremities.   4. Gait to be completed with goal set if prosthesis is present.                          Skilled PT Minutes Provided: 25   Communication with Treatment Team:     Equipment recommendations:       At end of treatment, patient remained:   Up in chair     Up in wheelchair in room    In bed    With alarm activated    Bed rails up    Call bell in reach     Family/friends present    Restraints secured properly    In bathroom with CNA/RN notified    Nurse aware   x In gym with therapist/tech    Other:

## 2024-05-15 NOTE — PT/OT/SLP PROGRESS
Occupational Therapy  Treatment    Name: Josep Angulo    : 1950 (73 y.o.)  MRN: [id number]           TREATMENT SUMMARY AND RECOMMENDATIONS:      OT Date of Treatment: 05/15/24  OT Start Time: 1300  OT Stop Time: 1320  OT Total Time (min): 20 min      Subjective Assessment:   No complaints  Lethargic   x Awake, alert, cooperative  Impulsive    Uncooperative   Flat affect    Agitated  c/o pain    Appropriate  c/o fatigue    Confused x Treated at bedside     Emotionally labile  Treated in gym/dept.      Other:        Therapy Precautions:    Cognitive deficits  Spinal precautions    Collar - hard  Sternal precautions    Collar - soft   TLSO   x Fall risk  LSO    Hip precautions - posterior  Knee immobilizer    Hip precautions - anterior  WBAT    Impaired communication  Partial weightbearing    Oxygen  TTWB    PEG tube  NWB    Visual deficits      Hearing deficits   Other:        Treatment Objectives:     Mobility Training:    Mobility task Assist level Comments    Bed mobility     Transfer CGA Modified squat/pivot t/f BSChair<>w/c    Sit to stands transitions     Functional mobility     Sitting balance     Standing balance      Other:        ADL Training:    ADL Assist level Comments    Feeding     Grooming/hygiene SBA Pt washed hands while seated at sink in w/c   Bathing     Upper body dressing     Lower body dressing     Toileting Mod A (+) void (+) BM; pt able to wipe in sitting; required clothing management d/t use of GB rather than walker because of urgency.    Toilet transfer CGA Stand/pivot t/f with GB w/c<>toilet    Adaptive equipment training     Other:         Assessment: Patient tolerated session well. Pt requesting short break before coming to gym for PT session. Pt had positive response to today's treatment. Pt continues to make good progress towards goals. Pt would continue to benefit from skilled OT services to improve pt's safety and independence with daily occupations, decrease caregiver  burden, and reduce pt's risk of falls.     GOALS:   Multidisciplinary Problems       Occupational Therapy Goals          Problem: Occupational Therapy    Goal Priority Disciplines Outcome Interventions   Occupational Therapy Goal     OT, PT/OT Progressing    Description: Goals to be met by: 5/28/24     Patient will increase functional independence with ADLs by performing:    UE Dressing with Modified [place].  LE Dressing with Contact Guard Assistance.  Grooming while seated at sink with Modified [place].  Toileting from bedside commode with Contact Guard Assistance for hygiene and clothing management.   Bathing from  shower chair/bench with Contact Guard Assistance.  Toilet transfer to bedside commode with Contact Guard Assistance.                         Recommendations:     Discharge Equipment Recommendations:  wheelchair     Plan:     Patient to be seen 6 x/week (QD/BID) to address the above listed problems via self-care/home management, therapeutic activities, therapeutic exercises, wheelchair management/training  Plan of Care Expires: 05/28/24  Plan of Care Reviewed with: patient  Revisions made to plan of care: No      Skilled OT Minutes Provided: 20  Communication with Treatment Team:     Equipment recommendations:       At end of treatment, patient remained:  x Up in chair     Up in wheelchair in room    In bed   x With alarm activated    Bed rails up   x Call bell in reach     Family/friends present    Restraints secured properly    In bathroom with CNA/RN notified    In gym with PT/PTA/tech    Nurse aware    Other:

## 2024-05-15 NOTE — PLAN OF CARE
Problem: Adult Inpatient Plan of Care  Goal: Plan of Care Review  Outcome: Progressing  Flowsheets (Taken 5/15/2024 0416)  Plan of Care Reviewed With: patient  Goal: Patient-Specific Goal (Individualized)  Outcome: Progressing  Flowsheets (Taken 5/15/2024 0416)  Individualized Care Needs: Monitor blood sugar q4hrs, Safety (, bed alarm, frequent rounding)  Anxieties, Fears or Concerns: None verbalized  Goal: Absence of Hospital-Acquired Illness or Injury  Outcome: Progressing  Intervention: Identify and Manage Fall Risk  Flowsheets (Taken 5/15/2024 0416)  Safety Promotion/Fall Prevention:   assistive device/personal item within reach   bed alarm set   muscle strengthening facilitated   nonskid shoes/socks when out of bed   lighting adjusted   medications reviewed   room near unit station  Intervention: Prevent Skin Injury  Flowsheets (Taken 5/15/2024 0416)  Body Position: sitting up in bed  Skin Protection:   incontinence pads utilized   skin sealant/moisture barrier applied   transparent dressing maintained   silicone foam dressing in place  Device Skin Pressure Protection:   absorbent pad utilized/changed   positioning supports utilized   tubing/devices free from skin contact  Intervention: Prevent and Manage VTE (Venous Thromboembolism) Risk  Flowsheets (Taken 5/15/2024 0416)  VTE Prevention/Management:   bleeding risk assessed   bleeding precations maintained   ROM (active) performed   fluids promoted  Intervention: Prevent Infection  Flowsheets (Taken 5/15/2024 0416)  Infection Prevention:   cohorting utilized   hand hygiene promoted   single patient room provided  Goal: Optimal Comfort and Wellbeing  Outcome: Progressing  Intervention: Monitor Pain and Promote Comfort  Flowsheets (Taken 5/15/2024 0416)  Pain Management Interventions:   diversional activity provided   pillow support provided   position adjusted   quiet environment facilitated  Intervention: Provide Person-Centered Care  Flowsheets (Taken  5/15/2024 0416)  Trust Relationship/Rapport:   care explained   choices provided   questions answered   thoughts/feelings acknowledged     Problem: Diabetes Comorbidity  Goal: Blood Glucose Level Within Targeted Range  Outcome: Progressing  Intervention: Monitor and Manage Glycemia  Flowsheets (Taken 5/15/2024 0416)  Glycemic Management: blood glucose monitored     Problem: Infection  Goal: Absence of Infection Signs and Symptoms  Outcome: Progressing  Intervention: Prevent or Manage Infection  Flowsheets (Taken 5/15/2024 0416)  Fever Reduction/Comfort Measures:   lightweight bedding   lightweight clothing  Infection Management: aseptic technique maintained  Isolation Precautions:   protective   precautions maintained     Problem: Skin Injury Risk Increased  Goal: Skin Health and Integrity  Outcome: Progressing  Intervention: Optimize Skin Protection  Flowsheets (Taken 5/15/2024 0416)  Pressure Reduction Techniques:   frequent weight shift encouraged   rest period provided between sit times   weight shift assistance provided  Pressure Reduction Devices: positioning supports utilized  Skin Protection:   incontinence pads utilized   skin sealant/moisture barrier applied   transparent dressing maintained   silicone foam dressing in place  Activity Management: Walk with assistive devise and /or staff member - L3  Head of Bed (HOB) Positioning: HOB at 30-45 degrees  Intervention: Promote and Optimize Oral Intake  Flowsheets (Taken 5/15/2024 0416)  Oral Nutrition Promotion:   social interaction promoted   rest periods promoted     Problem: Fall Injury Risk  Goal: Absence of Fall and Fall-Related Injury  Outcome: Progressing  Intervention: Identify and Manage Contributors  Flowsheets (Taken 5/15/2024 0416)  Self-Care Promotion:   BADL personal objects within reach   BADL personal routines maintained   adaptive equipment use encouraged  Medication Review/Management: medications reviewed  Intervention: Promote Injury-Free  Environment  Flowsheets (Taken 5/15/2024 0416)  Safety Promotion/Fall Prevention:   assistive device/personal item within reach   bed alarm set   muscle strengthening facilitated   nonskid shoes/socks when out of bed   lighting adjusted   medications reviewed   room near unit station     Problem: Mobility Impairment  Goal: Optimal Mobility  Outcome: Progressing  Intervention: Optimize Mobility  Flowsheets (Taken 5/15/2024 0416)  Assistive Device Utilized: lift device  Activity Management: Walk with assistive devise and /or staff member - L3  Positioning/Transfer Devices:   pillows   in use     Problem: Wound  Goal: Optimal Coping  Outcome: Progressing  Intervention: Support Patient and Family Response  Flowsheets (Taken 5/15/2024 0416)  Supportive Measures:   active listening utilized   goal-setting facilitated   self-care encouraged  Family/Support System Care: support provided  Goal: Optimal Functional Ability  Outcome: Progressing  Intervention: Optimize Functional Ability  Flowsheets (Taken 5/15/2024 0416)  Assistive Device Utilized: lift device  Activity Management: Walk with assistive devise and /or staff member - L3  Activity Assistance Provided: assistance, 2 people  Goal: Absence of Infection Signs and Symptoms  Outcome: Progressing  Intervention: Prevent or Manage Infection  Flowsheets (Taken 5/15/2024 0416)  Fever Reduction/Comfort Measures:   lightweight bedding   lightweight clothing  Infection Management: aseptic technique maintained  Isolation Precautions:   protective   precautions maintained  Goal: Improved Oral Intake  Outcome: Progressing  Intervention: Promote and Optimize Oral Intake  Flowsheets (Taken 5/15/2024 0416)  Oral Nutrition Promotion:   social interaction promoted   rest periods promoted  Goal: Optimal Pain Control and Function  Outcome: Progressing  Intervention: Prevent or Manage Pain  Flowsheets (Taken 5/15/2024 0416)  Sleep/Rest Enhancement:   consistent schedule promoted   regular  sleep/rest pattern promoted  Pain Management Interventions:   diversional activity provided   pillow support provided   position adjusted   quiet environment facilitated  Goal: Skin Health and Integrity  Outcome: Progressing  Intervention: Optimize Skin Protection  Flowsheets (Taken 5/15/2024 0416)  Pressure Reduction Techniques:   frequent weight shift encouraged   rest period provided between sit times   weight shift assistance provided  Pressure Reduction Devices: positioning supports utilized  Skin Protection:   incontinence pads utilized   skin sealant/moisture barrier applied   transparent dressing maintained   silicone foam dressing in place  Activity Management: Walk with assistive devise and /or staff member - L3  Head of Bed (HOB) Positioning: HOB at 30-45 degrees  Goal: Optimal Wound Healing  Outcome: Progressing  Intervention: Promote Wound Healing  Flowsheets (Taken 5/15/2024 0416)  Sleep/Rest Enhancement:   consistent schedule promoted   regular sleep/rest pattern promoted

## 2024-05-16 LAB
POCT GLUCOSE: 111 MG/DL (ref 70–110)
POCT GLUCOSE: 117 MG/DL (ref 70–110)
POCT GLUCOSE: 153 MG/DL (ref 70–110)

## 2024-05-16 PROCEDURE — 97110 THERAPEUTIC EXERCISES: CPT | Mod: CQ

## 2024-05-16 PROCEDURE — 63600175 PHARM REV CODE 636 W HCPCS: Performed by: PHYSICIAN ASSISTANT

## 2024-05-16 PROCEDURE — 97535 SELF CARE MNGMENT TRAINING: CPT

## 2024-05-16 PROCEDURE — 97530 THERAPEUTIC ACTIVITIES: CPT | Mod: CQ

## 2024-05-16 PROCEDURE — 97110 THERAPEUTIC EXERCISES: CPT

## 2024-05-16 PROCEDURE — 99900035 HC TECH TIME PER 15 MIN (STAT)

## 2024-05-16 PROCEDURE — 25000003 PHARM REV CODE 250: Performed by: PHYSICIAN ASSISTANT

## 2024-05-16 PROCEDURE — 25000003 PHARM REV CODE 250

## 2024-05-16 PROCEDURE — 94760 N-INVAS EAR/PLS OXIMETRY 1: CPT

## 2024-05-16 PROCEDURE — 11000001 HC ACUTE MED/SURG PRIVATE ROOM

## 2024-05-16 PROCEDURE — 97530 THERAPEUTIC ACTIVITIES: CPT

## 2024-05-16 PROCEDURE — 63600175 PHARM REV CODE 636 W HCPCS: Performed by: STUDENT IN AN ORGANIZED HEALTH CARE EDUCATION/TRAINING PROGRAM

## 2024-05-16 RX ORDER — LISINOPRIL 10 MG/1
10 TABLET ORAL DAILY
Status: DISCONTINUED | OUTPATIENT
Start: 2024-05-16 | End: 2024-05-20

## 2024-05-16 RX ORDER — HYDRALAZINE HYDROCHLORIDE 20 MG/ML
5 INJECTION INTRAMUSCULAR; INTRAVENOUS EVERY 4 HOURS PRN
Status: DISCONTINUED | OUTPATIENT
Start: 2024-05-16 | End: 2024-05-21 | Stop reason: HOSPADM

## 2024-05-16 RX ORDER — CITALOPRAM 10 MG/1
10 TABLET ORAL DAILY
Status: DISCONTINUED | OUTPATIENT
Start: 2024-05-16 | End: 2024-05-21 | Stop reason: HOSPADM

## 2024-05-16 RX ADMIN — CITALOPRAM HYDROBROMIDE 10 MG: 10 TABLET ORAL at 02:05

## 2024-05-16 RX ADMIN — METFORMIN HYDROCHLORIDE 500 MG: 500 TABLET ORAL at 09:05

## 2024-05-16 RX ADMIN — MIRTAZAPINE 15 MG: 7.5 TABLET, FILM COATED ORAL at 09:05

## 2024-05-16 RX ADMIN — GUAIFENESIN 200 MG: 200 SOLUTION ORAL at 10:05

## 2024-05-16 RX ADMIN — GUAIFENESIN 200 MG: 200 SOLUTION ORAL at 09:05

## 2024-05-16 RX ADMIN — ASPIRIN 81 MG: 81 TABLET, COATED ORAL at 08:05

## 2024-05-16 RX ADMIN — GUAIFENESIN 200 MG: 200 SOLUTION ORAL at 02:05

## 2024-05-16 RX ADMIN — GUAIFENESIN 200 MG: 200 SOLUTION ORAL at 05:05

## 2024-05-16 RX ADMIN — ENOXAPARIN SODIUM 40 MG: 40 INJECTION SUBCUTANEOUS at 05:05

## 2024-05-16 RX ADMIN — METFORMIN HYDROCHLORIDE 500 MG: 500 TABLET ORAL at 08:05

## 2024-05-16 RX ADMIN — FERROUS SULFATE TAB 325 MG (65 MG ELEMENTAL FE) 1 EACH: 325 (65 FE) TAB at 08:05

## 2024-05-16 RX ADMIN — HYDRALAZINE HYDROCHLORIDE 5 MG: 20 INJECTION INTRAMUSCULAR; INTRAVENOUS at 05:05

## 2024-05-16 RX ADMIN — TAMSULOSIN HYDROCHLORIDE 0.4 MG: 0.4 CAPSULE ORAL at 08:05

## 2024-05-16 RX ADMIN — LISINOPRIL 10 MG: 10 TABLET ORAL at 08:05

## 2024-05-16 NOTE — PROGRESS NOTES
Inpatient Nutrition Assessment    Admit Date: 5/13/2024   Total duration of encounter: 3 days   Patient Age: 73 y.o.    Nutrition Recommendation/Prescription     Continue diabetic 2,000 kcal diet 2. Monitor intake, wt, labs, medications    Communication of Recommendations: reviewed with patient    Nutrition Assessment     Malnutrition Assessment/Nutrition-Focused Physical Exam    Malnutrition Context: other (see comments) (doesn't meet criteria) (05/16/24 5330)                                                           A minimum of two characteristics is recommended for diagnosis of either severe or non-severe malnutrition.    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     Relevant Medical History: HTN, HLD, DM2, iron deficiency, bipolar disorder, insomnia, dementia, and 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     Scheduled Medications:  aspirin, 81 mg, Daily  citalopram, 10 mg, Daily  enoxparin, 40 mg, Daily  ferrous sulfate, 1 tablet, Daily  guaiFENesin 100 mg/5 ml, 200 mg, Q4H  lisinopriL, 10 mg, Daily  metFORMIN, 500 mg, BID  mirtazapine, 15 mg, QHS  tamsulosin, 0.4 mg, Daily    Continuous Infusions:   PRN Medications:  acetaminophen, 650 mg, Q4H PRN  albuterol-ipratropium, 3 mL, Q6H PRN  aluminum-magnesium hydroxide-simethicone, 30 mL, QID PRN  bisacodyL, 10 mg, Daily PRN  dextrose 10%, 12.5 g, PRN  dextrose 10%, 25 g, PRN  glucagon (human recombinant), 1 mg, PRN  glucose, 16 g, PRN  glucose, 24 g, PRN  hydrALAZINE,  "5 mg, Q4H PRN  HYDROcodone-acetaminophen, 1 tablet, Q6H PRN  insulin aspart U-100, 0-5 Units, QID (AC + HS) PRN  melatonin, 9 mg, Nightly PRN  morphine, 2 mg, Q6H PRN  naloxone, 0.02 mg, PRN  octreotide, 50 mcg, Q6H PRN  ondansetron, 8 mg, Q8H PRN  ondansetron, 4 mg, Q8H PRN  polyethylene glycol, 17 g, TID PRN  simethicone, 1 tablet, QID PRN  sodium chloride 0.9%, 10 mL, PRN    Calorie Containing IV Medications: no significant kcals from medications at this time    Recent Labs   Lab 05/13/24  1023 05/13/24  1030 05/14/24  0415     --  138   K 4.2  --  4.7   CALCIUM 9.4  --  8.2*   MG 2.01  --   --    CHLORIDE 105  --  107   CO2 24  --  24   BUN 23.7  --  16.4   CREATININE 1.17  --  1.19*   EGFRNORACEVR >60  --  >60   GLUCOSE 28*  --  171*   BILITOT 0.3  --   --    ALKPHOS 56  --   --    ALT 16  --   --    AST 46*  --   --    ALBUMIN 3.4  --   --    HSCRP 169.00*  --   --    HGBA1C  --  6.0  --    WBC 17.40*  --  10.48   HGB 11.3*  --  10.6*   HCT 34.6*  --  32.4*     Nutrition Orders:  Diet diabetic 2000 Calorie      Appetite/Oral Intake: good/% of meals  Factors Affecting Nutritional Intake: chewing difficulty  Social Needs Impacting Access to Food: none identified  Food/Shinto/Cultural Preferences: none reported  Food Allergies: none reported  Last Bowel Movement: 05/12/24  Wound(s):     Wound 05/13/24 Skin Tear Right posterior Elbow #4-Tissue loss description: Not applicable     Comments    Pt intake is good. Pt states doesn't have dentures with him, chopping meal per patient request. Discussed food preferences. Marked menus.     Anthropometrics    Height: 5' 8" (172.7 cm), Height Method: Stated  Last Weight: 76.1 kg (167 lb 12.3 oz) (05/16/24 1525), Weight Method: Bed Scale  BMI (Calculated): 25.5  BMI Classification: overweight (BMI 25-29.9)        Ideal Body Weight (IBW), Male: 154 lb     % Ideal Body Weight, Male (lb): 108.94 %   Adjust IBW for amputation left BKA: 148.1 lb  Amputee BMI (kgm2) " 27.18                       Usual Weight Provided By: patient denies unintentional weight loss    Wt Readings from Last 5 Encounters:   05/16/24 76.1 kg (167 lb 12.3 oz)   04/18/24 80.7 kg (178 lb)     Weight Change(s) Since Admission:   Wt Readings from Last 1 Encounters:   05/16/24 1525 76.1 kg (167 lb 12.3 oz)   05/13/24 1430 76.1 kg (167 lb 12.3 oz)   05/13/24 1002 79.4 kg (175 lb)   Admit Weight: 79.4 kg (175 lb) (05/13/24 1002), Weight Method: Estimated    Estimated Needs    Weight Used For Calorie Calculations: 76.1 kg (167 lb 12.3 oz)  Energy Calorie Requirements (kcal): 1,902.5 kcal (25 kcal/kgCBW)     Weight Used For Protein Calculations: 76.1 kg (167 lb 12.3 oz)  Protein Requirements: 76.26 gm (1.0 gm/kg/CBW)  Fluid Requirements (mL): 1,902.5 mL (1 mL/kcal)  CHO Requirement: 214 gm CHO per day (45% estimated kcal needs are from CHO     Enteral Nutrition     Patient not receiving enteral nutrition at this time.    Parenteral Nutrition     Patient not receiving parenteral nutrition support at this time.    Evaluation of Received Nutrient Intake    Calories: meeting estimated needs  Protein: meeting estimated needs    Patient Education     Not applicable.    Nutrition Diagnosis     PES: No nutrition diagnosis at this time    Nutrition Interventions     Intervention(s): general/healthful diet    Goal: Meet greater than 80% of nutritional needs by follow-up. (goal met)      Nutrition Goals & Monitoring     Dietitian will monitor: food and beverage intake, weight, weight change, electrolyte/renal panel, glucose/endocrine profile, and gastrointestinal profile  Discharge planning: continue diabetic 2,000 calorie diet  Nutrition Risk/Follow-Up: low (follow-up in 5-7 days)   Please consult if re-assessment needed sooner.

## 2024-05-16 NOTE — PROGRESS NOTES
[facility]  [facility] MEDICINE ~ PROGRESS NOTE    CHIEF COMPLAINT     Hospital follow up    HOSPITAL COURSE     73 year old male with a past medical history of HTN, HLD, DM2, iron deficiency, bipolar disorder, insomnia, dementia, and 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 Daughter reports patient was unable to follow commands and falling asleep easily. Upon arrival to the ER, vitals revealed elevated temperature reading and mildly elevated BP. Labs were significant for leukocytosis, hypoglycemia with a glucose of 28, elevated CRP. Patient was given a D10 infusion with improvement of glucose to 126. CXR revealed prominent interstitial markings with no focal opacification, no acute cardiopulmonary process appreciated. CT head revealed no acute intracranial abnormality identified with chronic microvascular ischemic disease. Daughter at bedside brought patient's home medication bottles. Patient had 4 bottles of Metformin 1000mg BID which daughter reports patient only takes once daily and 3 bottles of Glimepiride 2mg BID. Daughter reports she gets help from a nurse who comes to her home to organize the patient's medication therefore she does not believe he could have taken too much medication. On exam, CBG registered as <20. He did receive a one time dose of Octreotide 50mcg and D5NS at 100cc/hr. Patient is being admitted to hospital medicine service.     Today:  No reported complaints today. Tolerating Metformin. Psych consult placed yesterday to obtain level 2.    OBJECTIVE/PHYSICAL EXAM     VITAL SIGNS (MOST RECENT):  Temp: 98.5 °F (36.9 °C) (05/16/24 1057)  Pulse: 81 (05/16/24 1057)  Resp: 18 (05/16/24 0349)  BP: 119/71 (05/16/24 1057)  SpO2: 96 % (05/16/24 1057) VITAL SIGNS (24 HOUR RANGE):  Temp:  [97.9 °F (36.6 °C)-98.7 °F (37.1 °C)] 98.5 °F (36.9 °C)  Pulse:  [79-87] 81  Resp:  [18] 18  SpO2:  [96  %-98 %] 96 %  BP: (119-170)/(71-88) 119/71     GENERAL: In no acute distress, afebrile  HEENT: Atraumatic, normocephalic, moist mucous membranes, edentulous   CHEST: Clear to auscultation bilaterally  HEART: S1, S2, no appreciable murmur  ABDOMEN: Soft, nontender, BS +  MSK: Warm, no lower extremity edema, no clubbing or cyanosis, left BKA  NEUROLOGIC: Alert and oriented x4, moving all extremities with good strength   INTEGUMENTARY: See media  PSYCHIATRY: Appropriate mood and affect     ASSESSMENT/PLAN     Sulfonylurea induced hypoglycemia   Uncontrolled DM2   Hold home Glimepiride   Received D10 in ER   Received Octreotide 50mcg  Q4 and prn CBGs   Hypoglycemic protocol  A1c 6%   Continue Metformin at a decreased dose of 500mg BID      Acute metabolic encephalopathy - resolved   2/2 hypoglycemia and receiving sedating medications in the ER   See above treatment  Hold sedating medications   CT head revealed no acute intracranial abnormality identified with chronic microvascular ischemic disease     Leukocytosis - resolved   UA without UTI   CXR without acute findings     Covid, flu, RSV negative      HTN  Continue home Lisinopril      BPH  Continue home Flomax     Iron deficiency   Continue home iron supplementation     Bipolar disorder, insomnia, dementia  Continue home Mirtazapine when patient appropriate   Daughter requesting [facility] placement at [facility] consulted      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   PT/OT eval       DVT prophylaxis:  Lovenox     Anticipated discharge and disposition: Continue to monitor glucose, continue PT/OT, family requesting NH placement   __________________________________________________________________________    LABS/MICRO/MEDS/DIAGNOSTICS     LABS  Recent Labs     05/14/24  0415      K 4.7   CHLORIDE 107   CO2 24   BUN 16.4   CREATININE 1.19*   GLUCOSE 171*   CALCIUM 8.2*     Recent Labs     05/14/24  0415   WBC 10.48   RBC 3.72*   HCT 32.4*   MCV 87.1     "    MICROBIOLOGY  Microbiology Results (last 7 days)       Procedure Component Value Units Date/Time    Blood Culture [[id number]]  (Normal) Collected: 05/13/24 1436    Order Status: Completed Specimen: Blood Updated: 05/15/24 1800     Blood Culture No Growth At 48 Hours    Blood Culture [[id number]]  (Normal) Collected: 05/13/24 1436    Order Status: Completed Specimen: Blood Updated: 05/15/24 1800     Blood Culture No Growth At 48 Hours             MEDICATIONS   aspirin  81 mg Oral Daily    enoxparin  40 mg Subcutaneous Daily    ferrous sulfate  1 tablet Oral Daily    guaiFENesin 100 mg/5 ml  200 mg Oral Q4H    lisinopriL  10 mg Oral Daily    metFORMIN  500 mg Oral BID    mirtazapine  15 mg Oral QHS    tamsulosin  0.4 mg Oral Daily         INFUSIONS       DIAGNOSTIC TESTS  CT Head Without Contrast   Final Result      No acute intracranial abnormality identified.  Findings of chronic microvascular ischemic disease.         Electronically signed by: Jeffrey Gamez   Date:    05/13/2024   Time:    13:35      X-Ray Chest 1 View   Final Result      Prominent interstitial markings with no focal opacification.  No acute cardiopulmonary process appreciated.         Electronically signed by: Jeffrey Gamez   Date:    05/13/2024   Time:    11:11         No results found for: "EF"     NUTRITION STATUS  Patient meets ASPEN criteria for   malnutrition of   per RD assessment as evidenced by:                       A minimum of two characteristics is recommended for diagnosis of either severe or non-severe malnutrition.     Case related differential diagnoses have been reviewed; assessment and plan has been documented. I have personally reviewed the labs and test results that are currently available; I have reviewed the patients medication list. I have reviewed the consulting providers recommendations. I have reviewed or attempted to review medical records based upon their availability.  All of the patient's and/or family's " questions have been addressed and answered to the best of my ability.  I will continue to monitor closely and make adjustments to medical management as needed.  This document was created using M*Modal Fluency Direct.  Transcription errors may have been made.  Please contact me if any questions may rise regarding documentation to clarify transcription.      ALICE Hilton   Internal Medicine  Department of Hospital Medicine [facility] - [facility]

## 2024-05-16 NOTE — PT/OT/SLP PROGRESS
"Occupational Therapy  Treatment    Name: Josep Angulo    : 1950 (73 y.o.)  MRN: [id number]           TREATMENT SUMMARY AND RECOMMENDATIONS:      OT Date of Treatment: 24  OT Start Time: 931  OT Stop Time:   OT Total Time (min): 33 min      Subjective Assessment:   No complaints  Lethargic   x Awake, alert, cooperative  Impulsive    Uncooperative   Flat affect    Agitated  c/o pain    Appropriate  c/o fatigue    Confused x Treated at bedside     Emotionally labile x Treated in gym/dept.      Other:        Therapy Precautions:    Cognitive deficits  Spinal precautions    Collar - hard  Sternal precautions    Collar - soft   TLSO   x Fall risk  LSO    Hip precautions - posterior  Knee immobilizer    Hip precautions - anterior  WBAT    Impaired communication  Partial weightbearing    Oxygen  TTWB    PEG tube  NWB    Visual deficits      Hearing deficits   Other:        Treatment Objectives:     Mobility Training:    Mobility task Assist level Comments    Bed mobility Min A Supine>EOB   Transfer CGA Stand/pivot t/f with RW to w/c    Sit to stands transitions     Functional mobility CGA/min A X50 feet with RW and L prosthesis; cues for control of LLE d/t prosthetic not fitting appropriately    Sitting balance     Standing balance      Other:        ADL Training:    ADL Assist level Comments    Feeding     Grooming/hygiene CGA Pt stood at sink to perform hand hygiene    Bathing     Upper body dressing     Lower body dressing Mod A  Pt required assist to thread BLEs into briefs; able to manage over hips in standing    Toileting CGA (+) void (+) BM; pt able to wipe in standing with CGA for balance/safety    Toilet transfer CGA Stand/pivot t/f with RW to toilet    Adaptive equipment training     Other:           Therapeutic Exercise:   Exercise Sets Reps Comments   2# dowel 2 10 Shoulder press, chest press, straight arm raises, bicep curls                         Additional Comments:  Pt reported "head " "pressure" following gait; BP taken and /65    Assessment: Patient tolerated session well. Pt had positive response to today's treatment. Pt continues to make good progress towards goals. Pt would continue to benefit from skilled OT services to improve pt's safety and independence with daily occupations, decrease caregiver burden, and reduce pt's risk of falls.     GOALS:   Multidisciplinary Problems       Occupational Therapy Goals          Problem: Occupational Therapy    Goal Priority Disciplines Outcome Interventions   Occupational Therapy Goal     OT, PT/OT Progressing    Description: Goals to be met by: 5/28/24     Patient will increase functional independence with ADLs by performing:    UE Dressing with Modified [place].  LE Dressing with Contact Guard Assistance.  Grooming while seated at sink with Modified [place].  Toileting from bedside commode with Contact Guard Assistance for hygiene and clothing management.   Bathing from  shower chair/bench with Contact Guard Assistance.  Toilet transfer to bedside commode with Contact Guard Assistance.                         Recommendations:     Discharge Equipment Recommendations:  wheelchair     Plan:     Patient to be seen 6 x/week (QD/BID) to address the above listed problems via self-care/home management, therapeutic activities, therapeutic exercises, wheelchair management/training  Plan of Care Expires: 05/28/24  Plan of Care Reviewed with: patient  Revisions made to plan of care: No      Skilled OT Minutes Provided: 33  Communication with Treatment Team:     Equipment recommendations:       At end of treatment, patient remained:   Up in chair     Up in wheelchair in room    In bed    With alarm activated    Bed rails up    Call bell in reach     Family/friends present    Restraints secured properly    In bathroom with CNA/RN notified   x In gym with PT/PTA/tech    Nurse aware    Other:            "

## 2024-05-16 NOTE — PT/OT/SLP PROGRESS
Occupational Therapy  Treatment    Name: Josep Angulo    : 1950 (73 y.o.)  MRN: [id number]           TREATMENT SUMMARY AND RECOMMENDATIONS:      OT Date of Treatment: 24  OT Start Time: 1305  OT Stop Time: 1330  OT Total Time (min): 25 min      Subjective Assessment:   No complaints  Lethargic   x Awake, alert, cooperative  Impulsive    Uncooperative   Flat affect    Agitated  c/o pain    Appropriate  c/o fatigue    Confused x Treated at bedside     Emotionally labile x Treated in gym/dept.      Other:        Therapy Precautions:    Cognitive deficits  Spinal precautions    Collar - hard  Sternal precautions    Collar - soft   TLSO   x Fall risk  LSO    Hip precautions - posterior  Knee immobilizer    Hip precautions - anterior  WBAT    Impaired communication  Partial weightbearing    Oxygen  TTWB    PEG tube  NWB    Visual deficits      Hearing deficits   Other:        Treatment Objectives:     Mobility Training:    Mobility task Assist level Comments    Bed mobility     Transfer Min A x2 Stand/pivot t/f with RW to w/c   Sit to stands transitions Min A Sit to stand from [place] with min lifting assistance without L prosthetic    Functional mobility Min A x2 X10 feet with RW; hopping on RLE    Sitting balance     Standing balance      Other:          Therapeutic Exercise:   Exercise Sets Reps Comments   Arm bike 2 5 min Level 1; F/B                         Additional Comments:  Pt reported feeling head pressure again following arm bike; BP taken again 106/61. RN notified. OT did not priya L prosthetic at this time d/t not fitting well. Pt very emotional this session with bursts of crying.     Assessment: Patient tolerated session well.    GOALS:   Multidisciplinary Problems       Occupational Therapy Goals          Problem: Occupational Therapy    Goal Priority Disciplines Outcome Interventions   Occupational Therapy Goal     OT, PT/OT Progressing    Description: Goals to be met by: 24     Patient  will increase functional independence with ADLs by performing:    UE Dressing with Modified [place].  LE Dressing with Contact Guard Assistance.  Grooming while seated at sink with Modified [place].  Toileting from bedside commode with Contact Guard Assistance for hygiene and clothing management.   Bathing from  shower chair/bench with Contact Guard Assistance.  Toilet transfer to bedside commode with Contact Guard Assistance.                         Recommendations:     Discharge Equipment Recommendations:  wheelchair     Plan:     Patient to be seen 6 x/week (QD/BID) to address the above listed problems via self-care/home management, therapeutic activities, therapeutic exercises, wheelchair management/training  Plan of Care Expires: 05/28/24  Plan of Care Reviewed with: patient  Revisions made to plan of care: No      Skilled OT Minutes Provided: 25  Communication with Treatment Team:     Equipment recommendations:       At end of treatment, patient remained:   Up in chair     Up in wheelchair in room    In bed    With alarm activated    Bed rails up    Call bell in reach     Family/friends present    Restraints secured properly    In bathroom with CNA/RN notified   x In gym with PT/PTA/tech    Nurse aware    Other:

## 2024-05-16 NOTE — PLAN OF CARE
Problem: Adult Inpatient Plan of Care  Goal: Plan of Care Review  Outcome: Progressing  Flowsheets (Taken 5/15/2024 2310)  Plan of Care Reviewed With: patient  Goal: Patient-Specific Goal (Individualized)  Outcome: Progressing  Flowsheets (Taken 5/15/2024 2310)  Individualized Care Needs: Monitor blood sugar, Safety (, bed alarm, frequent rounding)  Anxieties, Fears or Concerns: None verbalized  Goal: Absence of Hospital-Acquired Illness or Injury  Outcome: Progressing  Intervention: Identify and Manage Fall Risk  Flowsheets (Taken 5/15/2024 2310)  Safety Promotion/Fall Prevention:   assistive device/personal item within reach   bed alarm set   muscle strengthening facilitated   nonskid shoes/socks when out of bed   lighting adjusted   medications reviewed   room near unit station  Intervention: Prevent Skin Injury  Flowsheets (Taken 5/15/2024 2310)  Body Position: sitting up in bed  Skin Protection:   incontinence pads utilized   skin sealant/moisture barrier applied  Device Skin Pressure Protection:   absorbent pad utilized/changed   positioning supports utilized   tubing/devices free from skin contact  Intervention: Prevent and Manage VTE (Venous Thromboembolism) Risk  Flowsheets (Taken 5/15/2024 2310)  VTE Prevention/Management:   bleeding risk assessed   bleeding precations maintained   ROM (active) performed   fluids promoted  Intervention: Prevent Infection  Flowsheets (Taken 5/15/2024 2310)  Infection Prevention:   cohorting utilized   hand hygiene promoted   single patient room provided  Goal: Optimal Comfort and Wellbeing  Outcome: Progressing  Intervention: Monitor Pain and Promote Comfort  Flowsheets (Taken 5/15/2024 2310)  Pain Management Interventions:   diversional activity provided   pillow support provided   position adjusted   quiet environment facilitated  Intervention: Provide Person-Centered Care  Flowsheets (Taken 5/15/2024 2310)  Trust Relationship/Rapport:   care explained   choices  provided   questions answered   thoughts/feelings acknowledged     Problem: Diabetes Comorbidity  Goal: Blood Glucose Level Within Targeted Range  Outcome: Progressing  Intervention: Monitor and Manage Glycemia  Flowsheets (Taken 5/15/2024 2310)  Glycemic Management: blood glucose monitored     Problem: Infection  Goal: Absence of Infection Signs and Symptoms  Outcome: Progressing  Intervention: Prevent or Manage Infection  Flowsheets (Taken 5/15/2024 2310)  Fever Reduction/Comfort Measures:   lightweight bedding   lightweight clothing  Infection Management: aseptic technique maintained  Isolation Precautions:   protective   precautions maintained     Problem: Skin Injury Risk Increased  Goal: Skin Health and Integrity  Outcome: Progressing  Intervention: Optimize Skin Protection  Flowsheets (Taken 5/15/2024 2310)  Pressure Reduction Techniques:   frequent weight shift encouraged   rest period provided between sit times   weight shift assistance provided  Pressure Reduction Devices: positioning supports utilized  Skin Protection:   incontinence pads utilized   skin sealant/moisture barrier applied  Activity Management: Walk with assistive devise and /or staff member - L3  Head of Bed (HOB) Positioning: HOB at 30-45 degrees  Intervention: Promote and Optimize Oral Intake  Flowsheets (Taken 5/15/2024 2310)  Oral Nutrition Promotion:   adaptive equipment use encouraged   rest periods promoted   social interaction promoted     Problem: Fall Injury Risk  Goal: Absence of Fall and Fall-Related Injury  Outcome: Progressing  Intervention: Identify and Manage Contributors  Flowsheets (Taken 5/15/2024 2310)  Self-Care Promotion:   BADL personal objects within reach   BADL personal routines maintained   adaptive equipment use encouraged  Medication Review/Management: medications reviewed  Intervention: Promote Injury-Free Environment  Flowsheets (Taken 5/15/2024 2310)  Safety Promotion/Fall Prevention:   assistive  device/personal item within reach   bed alarm set   muscle strengthening facilitated   nonskid shoes/socks when out of bed   lighting adjusted   medications reviewed   room near unit station     Problem: Mobility Impairment  Goal: Optimal Mobility  Outcome: Progressing  Intervention: Optimize Mobility  Flowsheets (Taken 5/15/2024 2310)  Assistive Device Utilized: lift device  Activity Management: Walk with assistive devise and /or staff member - L3  Positioning/Transfer Devices:   pillows   in use     Problem: Wound  Goal: Optimal Coping  Outcome: Progressing  Intervention: Support Patient and Family Response  Flowsheets (Taken 5/15/2024 2310)  Supportive Measures:   active listening utilized   goal-setting facilitated   self-care encouraged  Family/Support System Care: support provided  Goal: Optimal Functional Ability  Outcome: Progressing  Intervention: Optimize Functional Ability  Flowsheets (Taken 5/15/2024 2310)  Assistive Device Utilized: lift device  Activity Management: Walk with assistive devise and /or staff member - L3  Activity Assistance Provided: assistance, 1 person  Goal: Absence of Infection Signs and Symptoms  Outcome: Progressing  Intervention: Prevent or Manage Infection  Flowsheets (Taken 5/15/2024 2310)  Fever Reduction/Comfort Measures:   lightweight bedding   lightweight clothing  Infection Management: aseptic technique maintained  Isolation Precautions:   protective   precautions maintained  Goal: Improved Oral Intake  Outcome: Progressing  Intervention: Promote and Optimize Oral Intake  Flowsheets (Taken 5/15/2024 2310)  Oral Nutrition Promotion:   adaptive equipment use encouraged   rest periods promoted   social interaction promoted  Goal: Optimal Pain Control and Function  Outcome: Progressing  Intervention: Prevent or Manage Pain  Flowsheets (Taken 5/15/2024 2310)  Sleep/Rest Enhancement:   consistent schedule promoted   regular sleep/rest pattern promoted  Pain Management  Interventions:   diversional activity provided   pillow support provided   position adjusted   quiet environment facilitated  Goal: Skin Health and Integrity  Outcome: Progressing  Intervention: Optimize Skin Protection  Flowsheets (Taken 5/15/2024 2310)  Pressure Reduction Techniques:   frequent weight shift encouraged   rest period provided between sit times   weight shift assistance provided  Pressure Reduction Devices: positioning supports utilized  Skin Protection:   incontinence pads utilized   skin sealant/moisture barrier applied  Activity Management: Walk with assistive devise and /or staff member - L3  Head of Bed (HOB) Positioning: HOB at 30-45 degrees  Goal: Optimal Wound Healing  Outcome: Progressing  Intervention: Promote Wound Healing  Flowsheets (Taken 5/15/2024 2310)  Sleep/Rest Enhancement:   consistent schedule promoted   regular sleep/rest pattern promoted

## 2024-05-16 NOTE — PT/OT/SLP PROGRESS
Physical Therapy Treatment Note           Name: Josep Angulo    : 1950 (73 y.o.)  MRN: [id number]           TREATMENT SUMMARY AND RECOMMENDATIONS:    PT Received On: 24  PT Start Time: 1005     PT Stop Time: 1030  PT Total Time (min): 25 min     Subjective Assessment:  x No complaints  Lethargic    Awake, alert, cooperative  Uncooperative    Agitated  c/o pain    Appropriate  c/o fatigue    Confused  Treated at bedside     Emotionally labile x Treated in gym/dept.    Impulsive  Other:    Flat affect       Therapy Precautions:    Cognitive deficits  Spinal precautions    Collar - hard  Sternal precautions    Collar - soft   TLSO    Fall risk  LSO    Hip precautions - posterior  Knee immobilizer    Hip precautions - anterior  WBAT    Impaired communication  Partial weightbearing    Oxygen  TTWB    PEG tube  NWB    Visual deficits  Other:    Hearing deficits          Treatment Objectives:     Mobility Training:   Assist level Comments    Bed mobility     Transfer CGA Commode transfer with RW due to BM   Gait     Sit to stand transitions CGA Sit-stand 10 reps with B UE use   Sitting balance     Standing balance      Wheelchair mobility     Car transfer     Other:          Therapeutic Exercise:   Exercise Sets Reps Comments   B LE exer sitting  1 20 Ankle pumps, TKE, abd/add, knee lifts    B LE exer standing  1 20 B UE supported- abd/add, knee lifts                    Additional Comments:  L prosthesis improper fit noted    Assessment: Patient tolerated session well    PT Plan: continue  Revisions made to plan of care: No    GOALS:   Multidisciplinary Problems       Physical Therapy Goals          Problem: Physical Therapy    Goal Priority Disciplines Outcome Goal Variances Interventions   Physical Therapy Goal     PT, PT/OT Progressing     Description: Goals to be met by: Discharge      Patient will increase functional independence with mobility by performin. Sit to stand transfer with  Supervision  2. Bed to chair transfer with Supervision using Rolling Walker  3. Wheelchair propulsion x 300 feet with Modified [place] using bilateral uppper extremities.   4. Gait to be completed with goal set if prosthesis is present.                          Skilled PT Minutes Provided: 25   Communication with Treatment Team:     Equipment recommendations:       At end of treatment, patient remained:  x Up in chair     Up in wheelchair in room    In bed   x With alarm activated    Bed rails up   x Call bell in reach     Family/friends present    Restraints secured properly    In bathroom with CNA/RN notified    Nurse aware    In gym with therapist/tech    Other:

## 2024-05-16 NOTE — PT/OT/SLP PROGRESS
Physical Therapy Treatment Note           Name: Josep Angulo    : 1950 (73 y.o.)  MRN: [id number]           TREATMENT SUMMARY AND RECOMMENDATIONS:    PT Received On: 24  PT Start Time: 1330     PT Stop Time: 1355  PT Total Time (min): 25 min     Subjective Assessment:  x No complaints  Lethargic    Awake, alert, cooperative  Uncooperative    Agitated  c/o pain    Appropriate  c/o fatigue    Confused  Treated at bedside     Emotionally labile  Treated in gym/dept.    Impulsive  Other:    Flat affect       Therapy Precautions:    Cognitive deficits  Spinal precautions    Collar - hard  Sternal precautions    Collar - soft   TLSO    Fall risk  LSO    Hip precautions - posterior  Knee immobilizer    Hip precautions - anterior  WBAT    Impaired communication  Partial weightbearing    Oxygen  TTWB    PEG tube  NWB    Visual deficits  Other:    Hearing deficits          Treatment Objectives:     Mobility Training:   Assist level Comments    Bed mobility     Transfer CGA Amb with RW 10 ft x 2 commode<>recliner   Gait     Sit to stand transitions CGA Sit-stand 10 reps with B UE use   Sitting balance     Standing balance      Wheelchair mobility     Car transfer     Other:          Therapeutic Exercise:   Exercise Sets Reps Comments   B LE exer sitting 1 20 Ankle pumps, TKE, abd/add, knee lifts                          Additional Comments:  Pt had another BM this PM during session    Assessment: Patient tolerated session well    PT Plan: continue  Revisions made to plan of care: No    GOALS:   Multidisciplinary Problems       Physical Therapy Goals          Problem: Physical Therapy    Goal Priority Disciplines Outcome Goal Variances Interventions   Physical Therapy Goal     PT, PT/OT Progressing     Description: Goals to be met by: Discharge      Patient will increase functional independence with mobility by performin. Sit to stand transfer with Supervision  2. Bed to chair transfer with  Supervision using Rolling Walker  3. Wheelchair propulsion x 300 feet with Modified [place] using bilateral uppper extremities.   4. Gait to be completed with goal set if prosthesis is present.                          Skilled PT Minutes Provided: 25   Communication with Treatment Team:     Equipment recommendations:       At end of treatment, patient remained:  x Up in chair     Up in wheelchair in room    In bed   x With alarm activated    Bed rails up   x Call bell in reach     Family/friends present    Restraints secured properly    In bathroom with CNA/RN notified    Nurse aware    In gym with therapist/tech    Other:

## 2024-05-16 NOTE — CONSULTS
"5/16/2024  Josep Angulo   1950   [id number]            Psychiatry Evaluation    Date of Admission: 5/13/2024 10:00 AM    Chief Complaint: Level II triggered for nursing home placement. Reported history of bipolar disorder and dementia    SUBJECTIVE:   History of Present Illness:   Josep Angulo is a 73 y.o. 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 Presented to [facility] ED via EMS on 05/13/24 after having two falls the previous night and his daughter reporting he was "talking out of his head, cursing". Labs were significant for leukocytosis, hypoglycemia with a glucose of 28, elevated CRP. Patient was given a D10 infusion with improvement of glucose to 126. CXR revealed prominent interstitial markings with no focal opacification, no acute cardiopulmonary process appreciated. CT head revealed no acute intracranial abnormality identified with chronic microvascular ischemic disease. Planned nursing home placement and psychiatry has been consulted for evaluation due to trigger of level II.    Reported psychiatric history of bipolar disorder and dementia. Seen at the bedside where he is pleasant and cooperative. Unreliable historian. Does report previous episodes of elevated mood but states length of this episode was "five to ten minutes". Reports previous episodes of depression, but reports these as short-lived as well. Reports euthymic mood at this time and displays mood-congruent, full-range affect. No evidence of psychosis and he denies SI, HI, hallucinations, or paranoia. Does report issues with memory and reports frequently forgetting names. AAO x 4. Reports plan to go to nursing home and displays understanding of reasoning. Attentive to conversation, but displays some mild impairment in sustained attention through issues spelling WORLD backwards.     Collateral: Spoke to patient's daughter, Nellie Mosqueda. She reports patient " "has prior history of bipolar and dementia. When asked if he has episodes of elevated mood, decreased sleep, and increased energy she states yes, but she then reports that his last episode was Monday when he was admitted. She states he has these episodes occur every week or so and that he becomes agitated. States onset of dementia approximately "three to five" years ago and that he has had worsening short-term memory since then. She reports that they found him nude in his own residence at one point due to being hot. He also has episodes of agitation as well.    Past Psychiatric History:   Previous Psychiatric Hospitalizations: Denies   Previous Medication Trials: Mirtazapine  Previous Suicide Attempts: Denies   Outpatient psychiatrist: None    Current Medications:   Home Psychiatric Meds: Mirtazapine 15mg PO QD    Past Medical/Surgical History:   Past Medical History:   Diagnosis Date    Anemia, unspecified     Diabetes mellitus     Hypertension      Past Surgical History:   Procedure Laterality Date    BELOW KNEE AMPUTATION OF LOWER EXTREMITY           Family Psychiatric History:   Denies     Allergies:   Review of patient's allergies indicates:  No Known Allergies    Substance Abuse History:   Tobacco: Denies  Alcohol: Reports occasionally drinking beer with his son-in-law  Illicit Substances: Denies  Treatment: Denies        Scheduled Meds:    aspirin  81 mg Oral Daily    enoxparin  40 mg Subcutaneous Daily    ferrous sulfate  1 tablet Oral Daily    guaiFENesin 100 mg/5 ml  200 mg Oral Q4H    lisinopriL  10 mg Oral Daily    metFORMIN  500 mg Oral BID    mirtazapine  15 mg Oral QHS    tamsulosin  0.4 mg Oral Daily      PRN Meds:   Current Facility-Administered Medications:     acetaminophen, 650 mg, Oral, Q4H PRN    albuterol-ipratropium, 3 mL, Nebulization, Q6H PRN    aluminum-magnesium hydroxide-simethicone, 30 mL, Oral, QID PRN    bisacodyL, 10 mg, Rectal, Daily PRN    dextrose 10%, 12.5 g, Intravenous, PRN    " "dextrose 10%, 25 g, Intravenous, PRN    glucagon (human recombinant), 1 mg, Intramuscular, PRN    glucose, 16 g, Oral, PRN    glucose, 24 g, Oral, PRN    hydrALAZINE, 5 mg, Intravenous, Q4H PRN    HYDROcodone-acetaminophen, 1 tablet, Oral, Q6H PRN    insulin aspart U-100, 0-5 Units, Subcutaneous, QID (AC + HS) PRN    melatonin, 9 mg, Oral, Nightly PRN    morphine, 2 mg, Intravenous, Q6H PRN    naloxone, 0.02 mg, Intravenous, PRN    octreotide, 50 mcg, Subcutaneous, Q6H PRN    ondansetron, 8 mg, Oral, Q8H PRN    ondansetron, 4 mg, Intravenous, Q8H PRN    polyethylene glycol, 17 g, Oral, TID PRN    simethicone, 1 tablet, Oral, QID PRN    sodium chloride 0.9%, 10 mL, Intravenous, PRN   Psychotherapeutics (From admission, onward)      Start     Stop Route Frequency Ordered    05/13/24 2100  mirtazapine tablet 15 mg         -- Oral Nightly 05/13/24 1447              Social History:  Housing Status: Lives with daughter   Relationship Status/Sexual Orientation:    Children: 1  Education: Completed 9th grade. Completed trade program in [place].   Employment Status/Info: Retired since age "61". Previous [employer]     history: Denies  History of physical/sexual abuse: Denies   Access to gun: Denies       Legal History:   Past Charges/Incarcerations: Denies   Pending charges: Denies      OBJECTIVE:     Vitals   Vitals:    05/16/24 1057   BP: 119/71   Pulse: 81   Resp:    Temp: 98.5 °F (36.9 °C)        Labs/Imaging/Studies:   Recent Results (from the past 48 hour(s))   POCT glucose    Collection Time: 05/14/24  2:14 PM   Result Value Ref Range    POCT Glucose 258 (H) 70 - 110 mg/dL   POCT glucose    Collection Time: 05/14/24  4:02 PM   Result Value Ref Range    POCT Glucose 206 (H) 70 - 110 mg/dL   POCT glucose    Collection Time: 05/14/24  9:23 PM   Result Value Ref Range    POCT Glucose 130 (H) 70 - 110 mg/dL   POCT glucose    Collection Time: 05/15/24 12:16 AM   Result Value Ref Range    POCT Glucose 93 70 - " "110 mg/dL   POCT glucose    Collection Time: 05/15/24  5:10 AM   Result Value Ref Range    POCT Glucose 88 70 - 110 mg/dL   POCT glucose    Collection Time: 05/15/24  8:32 AM   Result Value Ref Range    POCT Glucose 152 (H) 70 - 110 mg/dL   POCT glucose    Collection Time: 05/15/24 10:59 AM   Result Value Ref Range    POCT Glucose 130 (H) 70 - 110 mg/dL   POCT glucose    Collection Time: 05/15/24  4:02 PM   Result Value Ref Range    POCT Glucose 130 (H) 70 - 110 mg/dL   POCT glucose    Collection Time: 05/15/24  9:49 PM   Result Value Ref Range    POCT Glucose 155 (H) 70 - 110 mg/dL   POCT glucose    Collection Time: 05/16/24  5:08 AM   Result Value Ref Range    POCT Glucose 111 (H) 70 - 110 mg/dL   POCT glucose    Collection Time: 05/16/24 11:04 AM   Result Value Ref Range    POCT Glucose 153 (H) 70 - 110 mg/dL      No results found for: "PHENYTOIN", "PHENOBARB", "VALPROATE", "CBMZ"        Psychiatric Mental Status Exam:  General Appearance: appears stated age, dressed in hospital garb, lying in bed, in no acute distress  Arousal: alert  Behavior: normal; cooperative; reasonably friendly, pleasant, and polite; appropriate eye-contact; under good behavioral control  Movements and Motor Activity: no tics, no tremors, no akathisia, no dystonia, no evidence of tardive dyskinesia  Orientation: oriented to person, place, time, and situation  Speech: normal rate, rhythm, volume, tone and pitch  Mood: Euthymic  Affect: mood-congruent  Thought Process: linear  Associations: no loosening of associations  Thought Content and Perceptions: no suicidal or homicidal ideation, no auditory or visual hallucinations, no paranoid ideation, no ideas of reference, no evidence of delusions or psychosis  Recent and Remote Memory: mild impairments noted, remote memory impaired, registers 3/3 objects, recalls 3/3 objects at 5 minutes; per interview/observation with patient  Attention and Concentration: able to spell WORLD forwards, unable " to spell WORLD backwards; per interview/observation with patient  Fund of Knowledge: vocabulary appropriate, incorrectly identifies the last five Presidents of the [place] (in order); based on history, vocabulary, fund of knowledge, syntax, grammar, and content  Insight: questionable; based on understanding of severity of illness and HPI  Judgment: questionable; based on patient's behavior and HPI          ASSESSMENT/PLAN:   Diagnoses:  Delirium   Unspecific dementia, moderate, with behavioral disturbance  Hx of bipolar disorder    Past Medical History:   Diagnosis Date    Anemia, unspecified     Diabetes mellitus     Hypertension      No evidence of mood or psychosis at present. Displays some impairments in cognition in the form of sustained attention and memory. While daughter reports history of bipolar disorder she reports that most recent episode was on Monday when he was admitted. Suspect that these episodes are conduct and mood disturbances in dementia.     Problem lists and Management Plans:  Medication Management  Continue remeron 15mg PO QD  Celexa 10mg PO QD for behavioral disturbance in dementia  Psychiatrically safe for discharge/transfer when medically stable.  Recommend psychiatric follow-up at nursing home.  Monitor for signs/symptoms of eliana.  Will sign-off; please reconsult as needed.    Sher Brito

## 2024-05-17 LAB
GLUCOSE SERPL-MCNC: 183 MG/DL (ref 70–110)
POCT GLUCOSE: 118 MG/DL (ref 70–110)
POCT GLUCOSE: 140 MG/DL (ref 70–110)
POCT GLUCOSE: 146 MG/DL (ref 70–110)
POCT GLUCOSE: 183 MG/DL (ref 70–110)
POCT GLUCOSE: 209 MG/DL (ref 70–110)

## 2024-05-17 PROCEDURE — 97530 THERAPEUTIC ACTIVITIES: CPT

## 2024-05-17 PROCEDURE — 94760 N-INVAS EAR/PLS OXIMETRY 1: CPT

## 2024-05-17 PROCEDURE — 11000001 HC ACUTE MED/SURG PRIVATE ROOM

## 2024-05-17 PROCEDURE — 97110 THERAPEUTIC EXERCISES: CPT

## 2024-05-17 PROCEDURE — 99900035 HC TECH TIME PER 15 MIN (STAT)

## 2024-05-17 PROCEDURE — 63600175 PHARM REV CODE 636 W HCPCS: Performed by: PHYSICIAN ASSISTANT

## 2024-05-17 PROCEDURE — 25000003 PHARM REV CODE 250

## 2024-05-17 PROCEDURE — 25000003 PHARM REV CODE 250: Performed by: PHYSICIAN ASSISTANT

## 2024-05-17 PROCEDURE — 97535 SELF CARE MNGMENT TRAINING: CPT

## 2024-05-17 RX ADMIN — FERROUS SULFATE TAB 325 MG (65 MG ELEMENTAL FE) 1 EACH: 325 (65 FE) TAB at 08:05

## 2024-05-17 RX ADMIN — CITALOPRAM HYDROBROMIDE 10 MG: 10 TABLET ORAL at 08:05

## 2024-05-17 RX ADMIN — GUAIFENESIN 200 MG: 200 SOLUTION ORAL at 05:05

## 2024-05-17 RX ADMIN — ENOXAPARIN SODIUM 40 MG: 40 INJECTION SUBCUTANEOUS at 05:05

## 2024-05-17 RX ADMIN — TAMSULOSIN HYDROCHLORIDE 0.4 MG: 0.4 CAPSULE ORAL at 08:05

## 2024-05-17 RX ADMIN — GUAIFENESIN 200 MG: 200 SOLUTION ORAL at 09:05

## 2024-05-17 RX ADMIN — MIRTAZAPINE 15 MG: 7.5 TABLET, FILM COATED ORAL at 08:05

## 2024-05-17 RX ADMIN — METFORMIN HYDROCHLORIDE 500 MG: 500 TABLET ORAL at 08:05

## 2024-05-17 RX ADMIN — INSULIN ASPART 2 UNITS: 100 INJECTION, SOLUTION INTRAVENOUS; SUBCUTANEOUS at 05:05

## 2024-05-17 RX ADMIN — GUAIFENESIN 200 MG: 200 SOLUTION ORAL at 10:05

## 2024-05-17 RX ADMIN — LISINOPRIL 10 MG: 10 TABLET ORAL at 08:05

## 2024-05-17 RX ADMIN — ASPIRIN 81 MG: 81 TABLET, COATED ORAL at 08:05

## 2024-05-17 NOTE — PT/OT/SLP PROGRESS
Physical Therapy Treatment Note           Name: Josep Angulo    : 1950 (73 y.o.)  MRN: [id number]           TREATMENT SUMMARY AND RECOMMENDATIONS:    PT Received On: 24  PT Start Time: 925     PT Stop Time: 955  PT Total Time (min): 30 min     Subjective Assessment:   No complaints  Lethargic   x Awake, alert, cooperative  Uncooperative    Agitated  c/o pain    Appropriate  c/o fatigue    Confused  Treated at bedside     Emotionally labile x Treated in gym/dept.    Impulsive  Other:    Flat affect       Therapy Precautions:    Cognitive deficits  Spinal precautions    Collar - hard  Sternal precautions    Collar - soft   TLSO   x Fall risk  LSO    Hip precautions - posterior  Knee immobilizer    Hip precautions - anterior  WBAT    Impaired communication  Partial weightbearing    Oxygen  TTWB    PEG tube  NWB    Visual deficits x Other: L BKA (poor fitting prosthesis in room)    Hearing deficits          Treatment Objectives:     Mobility Training:   Assist level Comments    Bed mobility SPV Supine>sit   Transfer MIN/CGA Stand pivot Bed>WC using RW   Gait Min A Amb on firm surface with RW  2 x 15 ft 3 point gait patterns due to L BKA   Sit to stand transitions Min A Sit-stand 5 reps with B UE use   Sitting balance     Standing balance      Wheelchair mobility SBA X 175 feet, improving I with turns, but extra time still needed   Car transfer     Other:          Therapeutic Exercise:   Exercise Sets Reps Comments   B LE exer long and short sitting  1 20 In all planes to promote muscle strength and ROM; 2# ankle wt on R LE                         Additional Comments:  No issues noted, overall improving I and strength demonstrated.     Assessment: Patient tolerated session well    PT Plan: continue  Revisions made to plan of care: Yes, gait goal added.     GOALS:   Multidisciplinary Problems       Physical Therapy Goals          Problem: Physical Therapy    Goal Priority Disciplines Outcome Goal  Variances Interventions   Physical Therapy Goal     PT, PT/OT Progressing     Description: Goals to be met by: Discharge      Patient will increase functional independence with mobility by performin. Sit to stand transfer with Supervision  2. Bed to chair transfer with Supervision using Rolling Walker  3. Wheelchair propulsion x 300 feet with Modified [place] using bilateral uppper extremities.   4. Gait x 50 feet at SPV levels using RW with or without prosthesis (currently poor fitting and needs  follow ou)                         Skilled PT Minutes Provided: 25   Communication with Treatment Team:     Equipment recommendations:       At end of treatment, patient remained:   Up in chair     Up in wheelchair in room    In bed    With alarm activated    Bed rails up    Call bell in reach     Family/friends present    Restraints secured properly    In bathroom with CNA/RN notified    Nurse aware   x In gym with therapist/tech    Other:

## 2024-05-17 NOTE — PROGRESS NOTES
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 Daughter reports patient was unable to follow commands and falling asleep easily. Upon arrival to the ER, vitals revealed elevated temperature reading and mildly elevated BP. Labs were significant for leukocytosis, hypoglycemia with a glucose of 28, elevated CRP. Patient was given a D10 infusion with improvement of glucose to 126. CXR revealed prominent interstitial markings with no focal opacification, no acute cardiopulmonary process appreciated. CT head revealed no acute intracranial abnormality identified with chronic microvascular ischemic disease. Daughter at bedside brought patient's home medication bottles. Patient had 4 bottles of Metformin 1000mg BID which daughter reports patient only takes once daily and 3 bottles of Glimepiride 2mg BID. Daughter reports she gets help from a nurse who comes to her home to organize the patient's medication therefore she does not believe he could have taken too much medication. On exam, CBG registered as <20. He did receive a one time dose of Octreotide 50mcg and D5NS at 100cc/hr. Patient is being admitted to hospital medicine service.     Today:  No reported complaints today. BP elevated this morning.    OBJECTIVE/PHYSICAL EXAM     VITAL SIGNS (MOST RECENT):  Temp: 98.5 °F (36.9 °C) (05/17/24 0745)  Pulse: 85 (05/17/24 0840)  Resp: 18 (05/17/24 0840)  BP: (!) 172/90 (05/17/24 0822)  SpO2: 96 % (05/17/24 0840) VITAL SIGNS (24 HOUR RANGE):  Temp:  [97.4 °F (36.3 °C)-98.7 °F (37.1 °C)] 98.5 °F (36.9 °C)  Pulse:  [78-94] 85  Resp:  [17-18] 18  SpO2:  [93 %-97 %] 96 %  BP: (133-172)/(68-90)  "172/90     GENERAL: In no acute distress, afebrile  HEENT: Atraumatic, normocephalic, moist mucous membranes, edentulous   CHEST: Clear to auscultation bilaterally  HEART: S1, S2, no appreciable murmur  ABDOMEN: Soft, nontender, BS +  MSK: Warm, no lower extremity edema, no clubbing or cyanosis, left BKA  NEUROLOGIC: Alert and oriented x4, moving all extremities with good strength   INTEGUMENTARY: See media  PSYCHIATRY: Appropriate mood and affect     ASSESSMENT/PLAN     Sulfonylurea induced hypoglycemia   Uncontrolled DM2   Hold home Glimepiride   Received D10 in ER   Received Octreotide 50mcg  [facility] CBGs   Hypoglycemic protocol  A1c 6%   Continue Metformin at a decreased dose of 500mg BID      Acute metabolic encephalopathy - resolved   2/2 hypoglycemia and receiving sedating medications in the ER   See above treatment  Hold sedating medications   CT head revealed no acute intracranial abnormality identified with chronic microvascular ischemic disease     Leukocytosis - resolved   UA without UTI   CXR without acute findings     Covid, flu, RSV negative      HTN  Continue home Lisinopril increased to 10mg on 05/16/2024     BPH  Continue home Flomax     Iron deficiency   Continue home iron supplementation     Bipolar disorder, insomnia, dementia  Continue home Mirtazapine when patient appropriate   Daughter requesting NH placement at [facility] consulted      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   PT/OT eval       DVT prophylaxis:  Lovenox     Anticipated discharge and disposition: Continue to monitor glucose, continue PT/OT, family requesting NH placement   __________________________________________________________________________    LABS/MICRO/MEDS/DIAGNOSTICS     LABS  No results for input(s): "NA", "K", "CHLORIDE", "CO2", "BUN", "CREATININE", "GLUCOSE", "CALCIUM", "ALKPHOS", "AST", "ALT", "ALBUMIN" in the last 72 hours.    No results for input(s): "WBC", "RBC", "HCT", "MCV", "PLT" in the last 72 " "hours.    Invalid input(s): "HG"    MICROBIOLOGY  Microbiology Results (last 7 days)       Procedure Component Value Units Date/Time    Blood Culture [[id number]]  (Normal) Collected: 05/13/24 1436    Order Status: Completed Specimen: Blood Updated: 05/16/24 1800     Blood Culture No Growth At 72 Hours    Blood Culture [[id number]]  (Normal) Collected: 05/13/24 1436    Order Status: Completed Specimen: Blood Updated: 05/16/24 1800     Blood Culture No Growth At 72 Hours             MEDICATIONS   aspirin  81 mg Oral Daily    citalopram  10 mg Oral Daily    enoxparin  40 mg Subcutaneous Daily    ferrous sulfate  1 tablet Oral Daily    guaiFENesin 100 mg/5 ml  200 mg Oral Q4H    lisinopriL  10 mg Oral Daily    metFORMIN  500 mg Oral BID    mirtazapine  15 mg Oral QHS    tamsulosin  0.4 mg Oral Daily         INFUSIONS       DIAGNOSTIC TESTS  CT Head Without Contrast   Final Result      No acute intracranial abnormality identified.  Findings of chronic microvascular ischemic disease.         Electronically signed by: Jeffrey Gamez   Date:    05/13/2024   Time:    13:35      X-Ray Chest 1 View   Final Result      Prominent interstitial markings with no focal opacification.  No acute cardiopulmonary process appreciated.         Electronically signed by: Jeffrey Gamez   Date:    05/13/2024   Time:    11:11         No results found for: "EF"     NUTRITION STATUS  Patient meets ASPEN criteria for   malnutrition of other (see comments) (doesn't meet criteria) per RD assessment as evidenced by:                       A minimum of two characteristics is recommended for diagnosis of either severe or non-severe malnutrition.     Case related differential diagnoses have been reviewed; assessment and plan has been documented. I have personally reviewed the labs and test results that are currently available; I have reviewed the patients medication list. I have reviewed the consulting providers recommendations. I have reviewed or " attempted to review medical records based upon their availability.  All of the patient's and/or family's questions have been addressed and answered to the best of my ability.  I will continue to monitor closely and make adjustments to medical management as needed.  This document was created using M*Modal Fluency Direct.  Transcription errors may have been made.  Please contact me if any questions may rise regarding documentation to clarify transcription.      ALICE Hilton   Internal Medicine  Department of Hospital Medicine [facility]

## 2024-05-17 NOTE — PLAN OF CARE
Problem: Adult Inpatient Plan of Care  Goal: Plan of Care Review  Outcome: Progressing  Flowsheets (Taken 5/16/2024 1945)  Plan of Care Reviewed With: patient  Goal: Patient-Specific Goal (Individualized)  Outcome: Progressing  Flowsheets (Taken 5/16/2024 1945)  Individualized Care Needs: Monitor blood sugar, Safety (, bed alarm, frequent rounding)  Anxieties, Fears or Concerns: None verbalized  Goal: Absence of Hospital-Acquired Illness or Injury  Outcome: Progressing  Intervention: Identify and Manage Fall Risk  Flowsheets (Taken 5/16/2024 1945)  Safety Promotion/Fall Prevention:   assistive device/personal item within reach   bed alarm set   muscle strengthening facilitated   nonskid shoes/socks when out of bed   lighting adjusted   medications reviewed   room near unit station  Intervention: Prevent Skin Injury  Flowsheets (Taken 5/16/2024 1945)  Body Position: sitting up in bed  Skin Protection:   incontinence pads utilized   transparent dressing maintained   silicone foam dressing in place  Device Skin Pressure Protection:   absorbent pad utilized/changed   positioning supports utilized   tubing/devices free from skin contact  Intervention: Prevent and Manage VTE (Venous Thromboembolism) Risk  Flowsheets (Taken 5/16/2024 1945)  VTE Prevention/Management:   bleeding risk assessed   bleeding precations maintained   ROM (active) performed   fluids promoted  Intervention: Prevent Infection  Flowsheets (Taken 5/16/2024 1945)  Infection Prevention:   cohorting utilized   hand hygiene promoted   single patient room provided  Goal: Optimal Comfort and Wellbeing  Outcome: Progressing  Intervention: Monitor Pain and Promote Comfort  Flowsheets (Taken 5/16/2024 1945)  Pain Management Interventions:   diversional activity provided   pillow support provided   position adjusted   quiet environment facilitated  Intervention: Provide Person-Centered Care  Flowsheets (Taken 5/16/2024 1945)  Trust Relationship/Rapport:    care explained   choices provided   questions answered   thoughts/feelings acknowledged     Problem: Diabetes Comorbidity  Goal: Blood Glucose Level Within Targeted Range  Outcome: Progressing  Intervention: Monitor and Manage Glycemia  Flowsheets (Taken 5/16/2024 1945)  Glycemic Management: blood glucose monitored     Problem: Infection  Goal: Absence of Infection Signs and Symptoms  Outcome: Progressing  Intervention: Prevent or Manage Infection  Flowsheets (Taken 5/16/2024 1945)  Fever Reduction/Comfort Measures:   lightweight bedding   lightweight clothing  Infection Management: aseptic technique maintained  Isolation Precautions:   protective   precautions maintained     Problem: Skin Injury Risk Increased  Goal: Skin Health and Integrity  Outcome: Progressing  Intervention: Optimize Skin Protection  Flowsheets (Taken 5/16/2024 1945)  Pressure Reduction Techniques:   frequent weight shift encouraged   rest period provided between sit times   weight shift assistance provided  Pressure Reduction Devices: positioning supports utilized  Skin Protection:   incontinence pads utilized   transparent dressing maintained   silicone foam dressing in place  Activity Management: Walk with assistive devise and /or staff member - L3  Head of Bed (HOB) Positioning: HOB at 30-45 degrees  Intervention: Promote and Optimize Oral Intake  Flowsheets (Taken 5/16/2024 1945)  Oral Nutrition Promotion:   rest periods promoted   social interaction promoted     Problem: Fall Injury Risk  Goal: Absence of Fall and Fall-Related Injury  Outcome: Progressing  Intervention: Identify and Manage Contributors  Flowsheets (Taken 5/16/2024 1945)  Self-Care Promotion:   BADL personal objects within reach   BADL personal routines maintained   adaptive equipment use encouraged  Medication Review/Management: medications reviewed  Intervention: Promote Injury-Free Environment  Flowsheets (Taken 5/16/2024 1945)  Safety Promotion/Fall Prevention:    assistive device/personal item within reach   bed alarm set   muscle strengthening facilitated   nonskid shoes/socks when out of bed   lighting adjusted   medications reviewed   room near unit station     Problem: Mobility Impairment  Goal: Optimal Mobility  Outcome: Progressing  Intervention: Optimize Mobility  Flowsheets (Taken 5/16/2024 1945)  Assistive Device Utilized: lift device  Activity Management: Walk with assistive devise and /or staff member - L3  Positioning/Transfer Devices:   pillows   in use     Problem: Wound  Goal: Optimal Coping  Outcome: Progressing  Intervention: Support Patient and Family Response  Flowsheets (Taken 5/16/2024 1945)  Supportive Measures:   active listening utilized   goal-setting facilitated   self-care encouraged  Family/Support System Care: support provided  Goal: Optimal Functional Ability  Outcome: Progressing  Intervention: Optimize Functional Ability  Flowsheets (Taken 5/16/2024 1945)  Assistive Device Utilized: lift device  Activity Management: Walk with assistive devise and /or staff member - L3  Activity Assistance Provided: assistance, 1 person  Goal: Absence of Infection Signs and Symptoms  Outcome: Progressing  Intervention: Prevent or Manage Infection  Flowsheets (Taken 5/16/2024 1945)  Fever Reduction/Comfort Measures:   lightweight bedding   lightweight clothing  Infection Management: aseptic technique maintained  Isolation Precautions:   protective   precautions maintained  Goal: Improved Oral Intake  Outcome: Progressing  Intervention: Promote and Optimize Oral Intake  Flowsheets (Taken 5/16/2024 1945)  Oral Nutrition Promotion:   rest periods promoted   social interaction promoted  Goal: Optimal Pain Control and Function  Outcome: Progressing  Intervention: Prevent or Manage Pain  Flowsheets (Taken 5/16/2024 1945)  Sleep/Rest Enhancement:   consistent schedule promoted   regular sleep/rest pattern promoted  Pain Management Interventions:   diversional activity  provided   pillow support provided   position adjusted   quiet environment facilitated  Goal: Skin Health and Integrity  Outcome: Progressing  Intervention: Optimize Skin Protection  Flowsheets (Taken 5/16/2024 1945)  Pressure Reduction Techniques:   frequent weight shift encouraged   rest period provided between sit times   weight shift assistance provided  Pressure Reduction Devices: positioning supports utilized  Skin Protection:   incontinence pads utilized   transparent dressing maintained   silicone foam dressing in place  Activity Management: Walk with assistive devise and /or staff member - L3  Head of Bed (HOB) Positioning: HOB at 30-45 degrees  Goal: Optimal Wound Healing  Outcome: Progressing  Intervention: Promote Wound Healing  Flowsheets (Taken 5/16/2024 1945)  Sleep/Rest Enhancement:   consistent schedule promoted   regular sleep/rest pattern promoted

## 2024-05-17 NOTE — PLAN OF CARE
Problem: Adult Inpatient Plan of Care  Goal: Plan of Care Review  Outcome: Progressing  Flowsheets (Taken 5/17/2024 1205)  Plan of Care Reviewed With: patient  Goal: Patient-Specific Goal (Individualized)  Outcome: Progressing  Flowsheets (Taken 5/17/2024 1205)  Individualized Care Needs: monitor CBGs, maintain safety, frequent rounding  Anxieties, Fears or Concerns: none expressed at this time  Goal: Absence of Hospital-Acquired Illness or Injury  Outcome: Progressing  Intervention: Identify and Manage Fall Risk  Flowsheets (Taken 5/17/2024 1205)  Safety Promotion/Fall Prevention:   assistive device/personal item within reach   bed alarm set   nonskid shoes/socks when out of bed   side rails raised x 3  Intervention: Prevent Skin Injury  Flowsheets (Taken 5/17/2024 1205)  Body Position: sitting up in bed  Skin Protection: incontinence pads utilized  Device Skin Pressure Protection:   absorbent pad utilized/changed   positioning supports utilized  Intervention: Prevent and Manage VTE (Venous Thromboembolism) Risk  Flowsheets (Taken 5/17/2024 1205)  VTE Prevention/Management:   bleeding precations maintained   bleeding risk assessed   fluids promoted  Intervention: Prevent Infection  Flowsheets (Taken 5/17/2024 1205)  Infection Prevention:   cohorting utilized   equipment surfaces disinfected   hand hygiene promoted   single patient room provided   rest/sleep promoted   environmental surveillance performed  Goal: Optimal Comfort and Wellbeing  Outcome: Progressing  Intervention: Monitor Pain and Promote Comfort  Flowsheets (Taken 5/17/2024 1205)  Pain Management Interventions:   care clustered   pillow support provided   position adjusted   relaxation techniques promoted   quiet environment facilitated  Intervention: Provide Person-Centered Care  Flowsheets (Taken 5/17/2024 1205)  Trust Relationship/Rapport:   care explained   choices provided   reassurance provided   questions encouraged   emotional support  provided   thoughts/feelings acknowledged   empathic listening provided   questions answered  Goal: Readiness for Transition of Care  Outcome: Progressing  Intervention: Mutually Develop Transition Plan  Flowsheets (Taken 5/17/2024 1205)  Communicated CIELO with patient/caregiver: Date not available/Unable to determine     Problem: Diabetes Comorbidity  Goal: Blood Glucose Level Within Targeted Range  Outcome: Progressing  Intervention: Monitor and Manage Glycemia  Flowsheets (Taken 5/17/2024 1205)  Glycemic Management: blood glucose monitored     Problem: Infection  Goal: Absence of Infection Signs and Symptoms  Outcome: Progressing  Intervention: Prevent or Manage Infection  Flowsheets (Taken 5/17/2024 1205)  Fever Reduction/Comfort Measures:   lightweight bedding   lightweight clothing  Infection Management: aseptic technique maintained  Isolation Precautions:   precautions maintained   protective     Problem: Skin Injury Risk Increased  Goal: Skin Health and Integrity  Outcome: Progressing  Intervention: Optimize Skin Protection  Flowsheets (Taken 5/17/2024 1205)  Pressure Reduction Techniques:   frequent weight shift encouraged   pressure points protected   rest period provided between sit times  Pressure Reduction Devices: positioning supports utilized  Skin Protection: incontinence pads utilized  Activity Management: Rolling - L1  Head of Bed (HOB) Positioning: HOB at 30-45 degrees  Intervention: Promote and Optimize Oral Intake  Flowsheets (Taken 5/17/2024 1205)  Oral Nutrition Promotion:   adaptive equipment use encouraged   rest periods promoted     Problem: Fall Injury Risk  Goal: Absence of Fall and Fall-Related Injury  Outcome: Progressing  Intervention: Identify and Manage Contributors  Flowsheets (Taken 5/17/2024 1205)  Self-Care Promotion:   BADL personal objects within reach   BADL personal routines maintained   meal set-up provided   adaptive equipment use encouraged  Medication Review/Management:    medications reviewed   high-risk medications identified  Intervention: Promote Injury-Free Environment  Flowsheets (Taken 5/17/2024 1205)  Safety Promotion/Fall Prevention:   assistive device/personal item within reach   bed alarm set   nonskid shoes/socks when out of bed   side rails raised x 3     Problem: Mobility Impairment  Goal: Optimal Mobility  Outcome: Progressing  Intervention: Optimize Mobility  Flowsheets (Taken 5/17/2024 1205)  Assistive Device Utilized: lift device  Activity Management: Rolling - L1  Positioning/Transfer Devices:   pillows   in use     Problem: Wound  Goal: Optimal Coping  Outcome: Progressing  Intervention: Support Patient and Family Response  Flowsheets (Taken 5/17/2024 1205)  Supportive Measures:   active listening utilized   positive reinforcement provided  Family/Support System Care: self-care encouraged  Goal: Optimal Functional Ability  Outcome: Progressing  Intervention: Optimize Functional Ability  Flowsheets (Taken 5/17/2024 1205)  Assistive Device Utilized: lift device  Activity Management: Rolling - L1  Activity Assistance Provided: assistance, 1 person  Goal: Absence of Infection Signs and Symptoms  Outcome: Progressing  Intervention: Prevent or Manage Infection  Flowsheets (Taken 5/17/2024 1205)  Fever Reduction/Comfort Measures:   lightweight bedding   lightweight clothing  Infection Management: aseptic technique maintained  Isolation Precautions:   precautions maintained   protective  Goal: Improved Oral Intake  Outcome: Progressing  Intervention: Promote and Optimize Oral Intake  Flowsheets (Taken 5/17/2024 1205)  Oral Nutrition Promotion:   adaptive equipment use encouraged   rest periods promoted  Goal: Optimal Pain Control and Function  Outcome: Progressing  Intervention: Prevent or Manage Pain  Flowsheets (Taken 5/17/2024 1205)  Sleep/Rest Enhancement:   awakenings minimized   consistent schedule promoted   relaxation techniques promoted  Pain Management  Interventions:   care clustered   pillow support provided   position adjusted   relaxation techniques promoted   quiet environment facilitated  Goal: Skin Health and Integrity  Outcome: Progressing  Intervention: Optimize Skin Protection  Flowsheets (Taken 5/17/2024 1205)  Pressure Reduction Techniques:   frequent weight shift encouraged   pressure points protected   rest period provided between sit times  Pressure Reduction Devices: positioning supports utilized  Skin Protection: incontinence pads utilized  Activity Management: Rolling - L1  Head of Bed (HOB) Positioning: HOB at 30-45 degrees  Goal: Optimal Wound Healing  Outcome: Progressing  Intervention: Promote Wound Healing  Flowsheets (Taken 5/17/2024 1205)  Sleep/Rest Enhancement:   awakenings minimized   consistent schedule promoted   relaxation techniques promoted

## 2024-05-17 NOTE — PT/OT/SLP PROGRESS
Occupational Therapy  Treatment    Name: Josep Angulo    : 1950 (73 y.o.)  MRN: [id number]           TREATMENT SUMMARY AND RECOMMENDATIONS:      OT Date of Treatment: 24  OT Start Time: 1330  OT Stop Time: 1400  OT Total Time (min): 30 min      Subjective Assessment:   No complaints  Lethargic   x Awake, alert, cooperative  Impulsive    Uncooperative   Flat affect    Agitated  c/o pain    Appropriate  c/o fatigue    Confused  Treated at bedside     Emotionally labile x Treated in gym/dept.      Other:        Therapy Precautions:    Cognitive deficits  Spinal precautions    Collar - hard  Sternal precautions    Collar - soft   TLSO   x Fall risk  LSO    Hip precautions - posterior  Knee immobilizer    Hip precautions - anterior  WBAT    Impaired communication  Partial weightbearing    Oxygen  TTWB    PEG tube  NWB    Visual deficits      Hearing deficits   Other:        Treatment Objectives:     Mobility Training:    Mobility task Assist level Comments    Bed mobility SBA Supine>EOB   Transfer CGA Modified squat/pivot t/f EOB>w/c    Sit to stands transitions     Functional mobility     Sitting balance SBA Pt sat on edge of w/c to reach to floor level to grasp resistive clothespins and then place anteriorly onto pole above shoulder level. Focus on functional reaching to improve independence in ADL tasks.    Standing balance      Other:        ADL Training:    ADL Assist level Comments    Feeding     Grooming/hygiene SBA Pt performed hand hygiene while seated in w/c at sink    Bathing     Upper body dressing     Lower body dressing     Toileting Min A (+) void (+) BM; min A for britney hygiene thoroughness.    Toilet transfer CGA Stand/pivot t/f with RW w/c<>toilet    Adaptive equipment training     Other:           Therapeutic Exercise:   Exercise Sets Reps Comments   Arm bike 2 5 min Level 1; F/B                         Assessment: Patient tolerated session well. Pt had positive response to today's  treatment. Pt continues to make good progress towards goals. Pt would continue to benefit from skilled OT services to improve pt's safety and independence with daily occupations, decrease caregiver burden, and reduce pt's risk of falls.       GOALS:   Multidisciplinary Problems       Occupational Therapy Goals          Problem: Occupational Therapy    Goal Priority Disciplines Outcome Interventions   Occupational Therapy Goal     OT, PT/OT Progressing    Description: Goals to be met by: 5/28/24     Patient will increase functional independence with ADLs by performing:    UE Dressing with Modified [place].  LE Dressing with Contact Guard Assistance.  Grooming while seated at sink with Modified [place].  Toileting from bedside commode with Contact Guard Assistance for hygiene and clothing management.   Bathing from  shower chair/bench with Contact Guard Assistance.  Toilet transfer to bedside commode with Contact Guard Assistance.                         Recommendations:     Discharge Equipment Recommendations:  wheelchair     Plan:     Patient to be seen 6 x/week (QD/BID) to address the above listed problems via self-care/home management, therapeutic activities, therapeutic exercises, wheelchair management/training  Plan of Care Expires: 05/28/24  Plan of Care Reviewed with: patient  Revisions made to plan of care: No      Skilled OT Minutes Provided: 30  Communication with Treatment Team:     Equipment recommendations:       At end of treatment, patient remained:   Up in chair     Up in wheelchair in room    In bed    With alarm activated    Bed rails up    Call bell in reach     Family/friends present    Restraints secured properly    In bathroom with CNA/RN notified   x In gym with PT/PTA/tech    Nurse aware    Other:

## 2024-05-17 NOTE — PT/OT/SLP PROGRESS
Occupational Therapy  Treatment    Name: Josep Angulo    : 1950 (73 y.o.)  MRN: [id number]           TREATMENT SUMMARY AND RECOMMENDATIONS:      OT Date of Treatment: 24  OT Start Time: 956  OT Stop Time:   OT Total Time (min): 28 min      Subjective Assessment:   No complaints  Lethargic   x Awake, alert, cooperative  Impulsive    Uncooperative   Flat affect    Agitated  c/o pain    Appropriate x c/o fatigue    Confused x Treated at bedside     Emotionally labile x Treated in gym/dept.     x Other: pt reports he did not sleep well last night d/t weather        Therapy Precautions:    Cognitive deficits  Spinal precautions    Collar - hard  Sternal precautions    Collar - soft   TLSO   x Fall risk  LSO    Hip precautions - posterior  Knee immobilizer    Hip precautions - anterior  WBAT    Impaired communication  Partial weightbearing    Oxygen  TTWB    PEG tube  NWB    Visual deficits      Hearing deficits   Other:        Treatment Objectives:     Mobility Training:    Mobility task Assist level Comments    Bed mobility SBA EOB>supine   Transfer CGA Modified squat/pivot t/f w/c>EOB with bed rail    Sit to stands transitions     Functional mobility     Sitting balance     Standing balance      Other:        ADL Training:    ADL Assist level Comments    Feeding     Grooming/hygiene Max A OT assisted with shaving face (d/t blood thinners/safety); pt able to wash face while at sink in w/c   Bathing     Upper body dressing     Lower body dressing     Toileting     Toilet transfer     Adaptive equipment training     Other:           Therapeutic Exercise:   Exercise Sets Reps Comments   3# dowel 2 10 Shoulder press, chest press, straight arm raises, bicep curls, forward rows, backwards rows                         Assessment: Patient tolerated session well. Pt had positive response to today's treatment. Pt continues to make good progress towards goals. Pt would continue to benefit from skilled OT  services to improve pt's safety and independence with daily occupations, decrease caregiver burden, and reduce pt's risk of falls.       GOALS:   Multidisciplinary Problems       Occupational Therapy Goals          Problem: Occupational Therapy    Goal Priority Disciplines Outcome Interventions   Occupational Therapy Goal     OT, PT/OT Progressing    Description: Goals to be met by: 5/28/24     Patient will increase functional independence with ADLs by performing:    UE Dressing with Modified [place].  LE Dressing with Contact Guard Assistance.  Grooming while seated at sink with Modified [place].  Toileting from bedside commode with Contact Guard Assistance for hygiene and clothing management.   Bathing from  shower chair/bench with Contact Guard Assistance.  Toilet transfer to bedside commode with Contact Guard Assistance.                         Recommendations:     Discharge Equipment Recommendations:  wheelchair     Plan:     Patient to be seen 6 x/week (QD/BID) to address the above listed problems via self-care/home management, therapeutic activities, therapeutic exercises, wheelchair management/training  Plan of Care Expires: 05/28/24  Plan of Care Reviewed with: patient  Revisions made to plan of care: No      Skilled OT Minutes Provided: 28  Communication with Treatment Team:     Equipment recommendations:       At end of treatment, patient remained:   Up in chair     Up in wheelchair in room   x In bed   x With alarm activated   x Bed rails up   x Call bell in reach     Family/friends present    Restraints secured properly    In bathroom with CNA/RN notified    In gym with PT/PTA/tech    Nurse aware    Other:

## 2024-05-17 NOTE — PROGRESS NOTES
Inpatient Nutrition Assessment    Admit Date: 5/13/2024   Total duration of encounter: 4 days   Patient Age: 73 y.o.    Nutrition Recommendation/Prescription     Continue diabetic 2,000 kcal diet 2. Monitor intake, wt, labs, medications    Communication of Recommendations: reviewed with patient    Nutrition Assessment     Malnutrition Assessment/Nutrition-Focused Physical Exam    Malnutrition Context: other (see comments) (doesn't meet criteria) (05/16/24 7143)                                                           A minimum of two characteristics is recommended for diagnosis of either severe or non-severe malnutrition.    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     Relevant Medical History: HTN, HLD, DM2, iron deficiency, bipolar disorder, insomnia, dementia, and 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     Scheduled Medications:  aspirin, 81 mg, Daily  citalopram, 10 mg, Daily  enoxparin, 40 mg, Daily  ferrous sulfate, 1 tablet, Daily  guaiFENesin 100 mg/5 ml, 200 mg, Q4H  lisinopriL, 10 mg, Daily  metFORMIN, 500 mg, BID  mirtazapine, 15 mg, QHS  tamsulosin, 0.4 mg, Daily    Continuous Infusions:   PRN Medications:  acetaminophen, 650 mg, Q4H PRN  albuterol-ipratropium, 3 mL, Q6H PRN  aluminum-magnesium hydroxide-simethicone, 30 mL, QID PRN  bisacodyL, 10 mg, Daily PRN  dextrose 10%, 12.5 g, PRN  dextrose 10%, 25 g, PRN  glucagon (human recombinant), 1 mg, PRN  glucose, 16 g, PRN  glucose, 24 g, PRN  hydrALAZINE,  "5 mg, Q4H PRN  HYDROcodone-acetaminophen, 1 tablet, Q6H PRN  insulin aspart U-100, 0-5 Units, QID (AC + HS) PRN  melatonin, 9 mg, Nightly PRN  morphine, 2 mg, Q6H PRN  naloxone, 0.02 mg, PRN  octreotide, 50 mcg, Q6H PRN  ondansetron, 8 mg, Q8H PRN  ondansetron, 4 mg, Q8H PRN  polyethylene glycol, 17 g, TID PRN  simethicone, 1 tablet, QID PRN  sodium chloride 0.9%, 10 mL, PRN    Calorie Containing IV Medications: no significant kcals from medications at this time    Recent Labs   Lab 05/13/24  1023 05/13/24  1030 05/14/24  0415     --  138   K 4.2  --  4.7   CALCIUM 9.4  --  8.2*   MG 2.01  --   --    CHLORIDE 105  --  107   CO2 24  --  24   BUN 23.7  --  16.4   CREATININE 1.17  --  1.19*   EGFRNORACEVR >60  --  >60   GLUCOSE 28*  --  171*   BILITOT 0.3  --   --    ALKPHOS 56  --   --    ALT 16  --   --    AST 46*  --   --    ALBUMIN 3.4  --   --    HSCRP 169.00*  --   --    HGBA1C  --  6.0  --    WBC 17.40*  --  10.48   HGB 11.3*  --  10.6*   HCT 34.6*  --  32.4*     Nutrition Orders:  Diet diabetic 2000 Calorie      Appetite/Oral Intake: good/% of meals  Factors Affecting Nutritional Intake: chewing difficulty  Social Needs Impacting Access to Food: none identified  Food/Mormon/Cultural Preferences: none reported  Food Allergies: none reported  Last Bowel Movement: 05/17/24  Wound(s):     Wound 05/13/24 Skin Tear Right posterior Elbow #4-Tissue loss description: Not applicable     Comments  05/17/24: Pt intake is good. Will continue to monitor.      05/16/24:Pt intake is good. Pt states doesn't have dentures with him, chopping meal per patient request. Discussed food preferences. Marked menus.     Anthropometrics    Height: 5' 8" (172.7 cm), Height Method: Stated  Last Weight: 76.1 kg (167 lb 12.3 oz) (05/16/24 1532), Weight Method: Bed Scale  BMI (Calculated): 25.5  BMI Classification: overweight (BMI 25-29.9)  Amputee BMI (kg/m2): 27.18 kg/m2     Ideal Body Weight (IBW), Male: 154 lb     % Ideal " Body Weight, Male (lb): 108.94 %  Amputation %: 5.9Adjust IBW for amputation left BKA: 148.1 lb  Amputee BMI (kgm2) 27.18  Total Amputation %: 5.9  Amputation Ideal Body Weight (IBW), Male (lb): 148.1 lb                 Usual Weight Provided By: patient denies unintentional weight loss    Wt Readings from Last 5 Encounters:   05/16/24 76.1 kg (167 lb 12.3 oz)   04/18/24 80.7 kg (178 lb)     Weight Change(s) Since Admission:   Wt Readings from Last 1 Encounters:   05/16/24 1532 76.1 kg (167 lb 12.3 oz)   05/16/24 1525 76.1 kg (167 lb 12.3 oz)   05/13/24 1430 76.1 kg (167 lb 12.3 oz)   05/13/24 1002 79.4 kg (175 lb)   Admit Weight: 79.4 kg (175 lb) (05/13/24 1002), Weight Method: Estimated    Estimated Needs    Weight Used For Calorie Calculations: 76.1 kg (167 lb 12.3 oz)  Energy Calorie Requirements (kcal): 1,902.5 kcal (25 kcal/kgCBW)     Weight Used For Protein Calculations: 76.1 kg (167 lb 12.3 oz)  Protein Requirements: 76.26 gm (1.0 gm/kg/CBW)  Fluid Requirements (mL): 1,902.5 mL (1 mL/kcal)  CHO Requirement: 214 gm CHO per day (45% estimated kcal needs are from CHO     Enteral Nutrition     Patient not receiving enteral nutrition at this time.    Parenteral Nutrition     Patient not receiving parenteral nutrition support at this time.    Evaluation of Received Nutrient Intake    Calories: meeting estimated needs  Protein: meeting estimated needs    Patient Education     Not applicable.    Nutrition Diagnosis     PES: No nutrition diagnosis at this time    Nutrition Interventions     Intervention(s): general/healthful diet    Goal: Meet greater than 80% of nutritional needs by follow-up. (goal met)      Nutrition Goals & Monitoring     Dietitian will monitor: food and beverage intake, weight, weight change, electrolyte/renal panel, glucose/endocrine profile, and gastrointestinal profile  Discharge planning: continue diabetic 2,000 calorie diet  Nutrition Risk/Follow-Up: low (follow-up in 5-7 days)   Please  consult if re-assessment needed sooner.

## 2024-05-17 NOTE — PLAN OF CARE
Left message for Giana Copeland at [facility] concerning update on referral. Awaiting call back

## 2024-05-17 NOTE — PLAN OF CARE
Problem: Physical Therapy  Goal: Physical Therapy Goal  Description: Goals to be met by: Discharge      Patient will increase functional independence with mobility by performin. Sit to stand transfer with Supervision  2. Bed to chair transfer with Supervision using Rolling Walker  3. Wheelchair propulsion x 300 feet with Modified [place] using bilateral uppper extremities.   4. Gait x 50 feet at SPV levels using RW with or without prosthesis (currently poor fitting and needs  follow ou)    Outcome: Progressing

## 2024-05-18 LAB
BACTERIA BLD CULT: NORMAL
BACTERIA BLD CULT: NORMAL
GLUCOSE SERPL-MCNC: 158 MG/DL (ref 70–110)
GLUCOSE SERPL-MCNC: 185 MG/DL (ref 70–110)
POCT GLUCOSE: 158 MG/DL (ref 70–110)
POCT GLUCOSE: 170 MG/DL (ref 70–110)
POCT GLUCOSE: 185 MG/DL (ref 70–110)
POCT GLUCOSE: 202 MG/DL (ref 70–110)

## 2024-05-18 PROCEDURE — 94760 N-INVAS EAR/PLS OXIMETRY 1: CPT

## 2024-05-18 PROCEDURE — 97535 SELF CARE MNGMENT TRAINING: CPT

## 2024-05-18 PROCEDURE — 97116 GAIT TRAINING THERAPY: CPT

## 2024-05-18 PROCEDURE — 11000001 HC ACUTE MED/SURG PRIVATE ROOM

## 2024-05-18 PROCEDURE — 63600175 PHARM REV CODE 636 W HCPCS: Performed by: PHYSICIAN ASSISTANT

## 2024-05-18 PROCEDURE — 97530 THERAPEUTIC ACTIVITIES: CPT

## 2024-05-18 PROCEDURE — 97110 THERAPEUTIC EXERCISES: CPT

## 2024-05-18 PROCEDURE — 25000003 PHARM REV CODE 250

## 2024-05-18 PROCEDURE — 99900035 HC TECH TIME PER 15 MIN (STAT)

## 2024-05-18 PROCEDURE — 25000003 PHARM REV CODE 250: Performed by: PHYSICIAN ASSISTANT

## 2024-05-18 RX ORDER — GUAIFENESIN 100 MG/5ML
200 SOLUTION ORAL EVERY 4 HOURS PRN
Status: DISCONTINUED | OUTPATIENT
Start: 2024-05-18 | End: 2024-05-21 | Stop reason: HOSPADM

## 2024-05-18 RX ADMIN — ASPIRIN 81 MG: 81 TABLET, COATED ORAL at 09:05

## 2024-05-18 RX ADMIN — METFORMIN HYDROCHLORIDE 500 MG: 500 TABLET ORAL at 09:05

## 2024-05-18 RX ADMIN — METFORMIN HYDROCHLORIDE 500 MG: 500 TABLET ORAL at 08:05

## 2024-05-18 RX ADMIN — FERROUS SULFATE TAB 325 MG (65 MG ELEMENTAL FE) 1 EACH: 325 (65 FE) TAB at 09:05

## 2024-05-18 RX ADMIN — MIRTAZAPINE 15 MG: 7.5 TABLET, FILM COATED ORAL at 08:05

## 2024-05-18 RX ADMIN — GUAIFENESIN 200 MG: 200 SOLUTION ORAL at 05:05

## 2024-05-18 RX ADMIN — LISINOPRIL 10 MG: 10 TABLET ORAL at 09:05

## 2024-05-18 RX ADMIN — ENOXAPARIN SODIUM 40 MG: 40 INJECTION SUBCUTANEOUS at 05:05

## 2024-05-18 RX ADMIN — GUAIFENESIN 200 MG: 200 SOLUTION ORAL at 01:05

## 2024-05-18 RX ADMIN — INSULIN ASPART 2 UNITS: 100 INJECTION, SOLUTION INTRAVENOUS; SUBCUTANEOUS at 12:05

## 2024-05-18 RX ADMIN — CITALOPRAM HYDROBROMIDE 10 MG: 10 TABLET ORAL at 09:05

## 2024-05-18 RX ADMIN — TAMSULOSIN HYDROCHLORIDE 0.4 MG: 0.4 CAPSULE ORAL at 09:05

## 2024-05-18 NOTE — PT/OT/SLP PROGRESS
Occupational Therapy  Treatment    Name: Josep Angulo    : 1950 (73 y.o.)  MRN: [id number]           TREATMENT SUMMARY AND RECOMMENDATIONS:      OT Date of Treatment: 24  OT Start Time: 1030  OT Stop Time: 1100  OT Total Time (min): 30 min      Subjective Assessment:   No complaints  Lethargic   X Awake, alert, cooperative  Impulsive    Uncooperative   Flat affect    Agitated  c/o pain    Appropriate  c/o fatigue    Confused X Treated at bedside     Emotionally labile X Treated in gym/dept.      Other:        Therapy Precautions:    Cognitive deficits  Spinal precautions    Collar - hard  Sternal precautions    Collar - soft   TLSO   X Fall risk  LSO    Hip precautions - posterior  Knee immobilizer    Hip precautions - anterior  WBAT    Impaired communication  Partial weightbearing    Oxygen  TTWB    PEG tube  NWB    Visual deficits      Hearing deficits  X Other: L BKA       Treatment Objectives:     Mobility Training:    Mobility task Assist level Comments    Bed mobility SBA Semi supine>EOB   Transfer Min A Stand pivot t/f c RW EOB>recliner   Sit to stands transitions     Functional mobility     Sitting balance     Standing balance      Other:        ADL Training:    ADL Assist level Comments    Feeding     Grooming/hygiene     Bathing     Upper body dressing Setup Pt seated EOB doffed/donned hospital gown    Lower body dressing     Toileting     Toilet transfer     Adaptive equipment training     Other:           Therapeutic Exercise:   Exercise Sets Reps Comments   UBE 2 5' F/B Min/Mod resistance  Multiple Min-Mod length r/bs PRN                         Assessment: Patient tolerated session well. Pt making steady progress towards established goals. Pt would continue to benefit from skilled OT services to improve pt's safety and independence with daily occupations, decrease caregiver burden, and reduce pt's risk of falls.     GOALS:   Multidisciplinary Problems       Occupational Therapy Goals           Problem: Occupational Therapy    Goal Priority Disciplines Outcome Interventions   Occupational Therapy Goal     OT, PT/OT Progressing    Description: Goals to be met by: 5/28/24     Patient will increase functional independence with ADLs by performing:    UE Dressing with Modified [place].  LE Dressing with Contact Guard Assistance.  Grooming while seated at sink with Modified [place].  Toileting from bedside commode with Contact Guard Assistance for hygiene and clothing management.   Bathing from  shower chair/bench with Contact Guard Assistance.  Toilet transfer to bedside commode with Contact Guard Assistance.                         Recommendations:     Discharge Equipment Recommendations:  wheelchair     Plan:     Patient to be seen 6 x/week (QD/BID) to address the above listed problems via self-care/home management, therapeutic activities, therapeutic exercises, wheelchair management/training  Plan of Care Expires: 05/28/24  Plan of Care Reviewed with: patient  Revisions made to plan of care: No      Skilled OT Minutes Provided: 30  Communication with Treatment Team:     Equipment recommendations:       At end of treatment, patient remained:  X Up in chair     Up in wheelchair in room    In bed   X With alarm activated    Bed rails up   X Call bell in reach     Family/friends present    Restraints secured properly    In bathroom with CNA/RN notified    In gym with PT/PTA/tech    Nurse aware    Other:

## 2024-05-18 NOTE — PLAN OF CARE
Problem: Adult Inpatient Plan of Care  Goal: Plan of Care Review  Outcome: Progressing  Flowsheets (Taken 5/17/2024 2030)  Plan of Care Reviewed With: patient  Goal: Patient-Specific Goal (Individualized)  Outcome: Progressing  Flowsheets (Taken 5/17/2024 2000)  Individualized Care Needs:   Safety with mobility to prevent injury   Enhanced safety measures with video monitoring   Monitor blood glucose levels.  Anxieties, Fears or Concerns: None offered at this time.  Patient/Family-Specific Goals (Include Timeframe): Return to optimal state of health by CIELO for discharge to healthcare facility.  Goal: Absence of Hospital-Acquired Illness or Injury  Outcome: Progressing  Intervention: Identify and Manage Fall Risk  Flowsheets (Taken 5/17/2024 2030)  Safety Promotion/Fall Prevention:   bed alarm set   assistive device/personal item within reach   Fall Risk reviewed with patient/family   Fall Risk signage in place   gait belt with ambulation   high risk medications identified   medications reviewed   lighting adjusted   nonskid shoes/socks when out of bed   room near unit station   /camera at bedside   instructed to call staff for mobility  Intervention: Prevent Skin Injury  Flowsheets (Taken 5/17/2024 2030)  Body Position:   position changed independently   supine   weight shifting  Skin Protection:   incontinence pads utilized   skin sealant/moisture barrier applied  Device Skin Pressure Protection:   absorbent pad utilized/changed   tubing/devices free from skin contact  Intervention: Prevent and Manage VTE (Venous Thromboembolism) Risk  Flowsheets (Taken 5/17/2024 2030)  VTE Prevention/Management: (On Lovenox)   bleeding risk assessed   bleeding precations maintained   dorsiflexion/plantar flexion performed   fluids promoted   ROM (active) performed  Intervention: Prevent Infection  Flowsheets (Taken 5/17/2024 2030)  Infection Prevention:   environmental surveillance performed   equipment surfaces  disinfected   hand hygiene promoted   personal protective equipment utilized   rest/sleep promoted  Goal: Optimal Comfort and Wellbeing  Outcome: Progressing  Intervention: Monitor Pain and Promote Comfort  Flowsheets (Taken 5/17/2024 2030)  Pain Management Interventions:   care clustered   pain management plan reviewed with patient/caregiver   medication offered   quiet environment facilitated  Intervention: Provide Person-Centered Care  Flowsheets (Taken 5/17/2024 2030)  Trust Relationship/Rapport:   care explained   choices provided   emotional support provided   empathic listening provided   questions answered   questions encouraged   reassurance provided   thoughts/feelings acknowledged     Problem: Diabetes Comorbidity  Goal: Blood Glucose Level Within Targeted Range  Outcome: Progressing  Intervention: Monitor and Manage Glycemia  Flowsheets (Taken 5/17/2024 2030)  Glycemic Management: blood glucose monitored     Problem: Infection  Goal: Absence of Infection Signs and Symptoms  Outcome: Progressing  Intervention: Prevent or Manage Infection  Flowsheets (Taken 5/17/2024 2030)  Fever Reduction/Comfort Measures:   lightweight bedding   lightweight clothing  Infection Management: aseptic technique maintained     Problem: Skin Injury Risk Increased  Goal: Skin Health and Integrity  Outcome: Progressing     Problem: Fall Injury Risk  Goal: Absence of Fall and Fall-Related Injury  Outcome: Progressing     Problem: Mobility Impairment  Goal: Optimal Mobility  Outcome: Progressing     Problem: Wound  Goal: Absence of Infection Signs and Symptoms  Outcome: Progressing  Intervention: Prevent or Manage Infection  Flowsheets (Taken 5/17/2024 2030)  Fever Reduction/Comfort Measures:   lightweight bedding   lightweight clothing  Infection Management: aseptic technique maintained  Goal: Skin Health and Integrity  Outcome: Progressing  Intervention: Optimize Skin Protection  Flowsheets (Taken 5/17/2024 2030)  Pressure  Reduction Techniques: frequent weight shift encouraged  Pressure Reduction Devices: heel offloading device utilized  Skin Protection:   incontinence pads utilized   skin sealant/moisture barrier applied  Activity Management:   Up in chair - L3   Rolling - L1  Head of Bed (HOB) Positioning: HOB at 20-30 degrees  Goal: Optimal Wound Healing  Outcome: Progressing  Intervention: Promote Wound Healing  Flowsheets (Taken 5/17/2024 2030)  Sleep/Rest Enhancement:   regular sleep/rest pattern promoted   awakenings minimized

## 2024-05-18 NOTE — PROGRESS NOTES
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 Daughter reports patient was unable to follow commands and falling asleep easily. Upon arrival to the ER, vitals revealed elevated temperature reading and mildly elevated BP. Labs were significant for leukocytosis, hypoglycemia with a glucose of 28, elevated CRP. Patient was given a D10 infusion with improvement of glucose to 126. CXR revealed prominent interstitial markings with no focal opacification, no acute cardiopulmonary process appreciated. CT head revealed no acute intracranial abnormality identified with chronic microvascular ischemic disease. Daughter at bedside brought patient's home medication bottles. Patient had 4 bottles of Metformin 1000mg BID which daughter reports patient only takes once daily and 3 bottles of Glimepiride 2mg BID. Daughter reports she gets help from a nurse who comes to her home to organize the patient's medication therefore she does not believe he could have taken too much medication. On exam, CBG registered as <20. He did receive a one time dose of Octreotide 50mcg and D5NS at 100cc/hr. Patient is being admitted to hospital medicine service.     Today:  No reported complaints today.     OBJECTIVE/PHYSICAL EXAM     VITAL SIGNS (MOST RECENT):  Temp: 97.8 °F (36.6 °C) (05/18/24 0356)  Pulse: 76 (05/18/24 0356)  Resp: 17 (05/18/24 0356)  BP: (!) 143/75 (05/18/24 0356)  SpO2: (!) 93 % (05/18/24 0356) VITAL SIGNS (24 HOUR RANGE):  Temp:  [97.8 °F (36.6 °C)-98.5 °F (36.9 °C)] 97.8 °F (36.6 °C)  Pulse:  [76-86] 76  Resp:  [17-18] 17  SpO2:  [93 %-96 %] 93 %  BP: (125-172)/(71-90) 143/75     GENERAL: In  "no acute distress, afebrile  HEENT: Atraumatic, normocephalic,  CHEST: Unlabored  HEART: S1, S2  ABDOMEN: Non distended      ASSESSMENT/PLAN     Sulfonylurea induced hypoglycemia   Uncontrolled DM2   Hold home Glimepiride   Received D10 in ER   Received Octreotide 50mcg  ACHS CB   Hypoglycemic protocol  A1c 6%   Continue Metformin at a decreased dose of 500mg BID      Acute metabolic encephalopathy - resolved   2/2 hypoglycemia and receiving sedating medications in the ER   See above treatment  Hold sedating medications   CT head revealed no acute intracranial abnormality identified with chronic microvascular ischemic disease     Leukocytosis - resolved   UA without UTI   CXR without acute findings     Covid, flu, RSV negative      HTN  Continue home Lisinopril increased to 10mg on 05/16/2024     BPH  Continue home Flomax     Iron deficiency   Continue home iron supplementation     Bipolar disorder, insomnia, dementia  Continue home Mirtazapine when patient appropriate   Daughter requesting NH placement at [facility] consulted      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   PT/OT eval       DVT prophylaxis:  Lovenox     Anticipated discharge and disposition: Continue to monitor glucose, continue PT/OT, family requesting NH placement   __________________________________________________________________________    LABS/MICRO/MEDS/DIAGNOSTICS     LABS  No results for input(s): "NA", "K", "CHLORIDE", "CO2", "BUN", "CREATININE", "GLUCOSE", "CALCIUM", "ALKPHOS", "AST", "ALT", "ALBUMIN" in the last 72 hours.    No results for input(s): "WBC", "RBC", "HCT", "MCV", "PLT" in the last 72 hours.    Invalid input(s): "HG"    MICROBIOLOGY  Microbiology Results (last 7 days)       Procedure Component Value Units Date/Time    Blood Culture [[id number]]  (Normal) Collected: 05/13/24 1436    Order Status: Completed Specimen: Blood Updated: 05/17/24 1800     Blood Culture No Growth At 96 Hours    Blood Culture [[id number]]  " "(Normal) Collected: 05/13/24 1436    Order Status: Completed Specimen: Blood Updated: 05/17/24 1800     Blood Culture No Growth At 96 Hours             MEDICATIONS   aspirin  81 mg Oral Daily    citalopram  10 mg Oral Daily    enoxparin  40 mg Subcutaneous Daily    ferrous sulfate  1 tablet Oral Daily    lisinopriL  10 mg Oral Daily    metFORMIN  500 mg Oral BID    mirtazapine  15 mg Oral QHS    tamsulosin  0.4 mg Oral Daily         INFUSIONS       DIAGNOSTIC TESTS  CT Head Without Contrast   Final Result      No acute intracranial abnormality identified.  Findings of chronic microvascular ischemic disease.         Electronically signed by: Jeffrey Gamez   Date:    05/13/2024   Time:    13:35      X-Ray Chest 1 View   Final Result      Prominent interstitial markings with no focal opacification.  No acute cardiopulmonary process appreciated.         Electronically signed by: Jeffrey Gamez   Date:    05/13/2024   Time:    11:11         No results found for: "EF"     NUTRITION STATUS  Patient meets ASPEN criteria for   malnutrition of other (see comments) (doesn't meet criteria) per RD assessment as evidenced by:                       A minimum of two characteristics is recommended for diagnosis of either severe or non-severe malnutrition.     Case related differential diagnoses have been reviewed; assessment and plan has been documented. I have personally reviewed the labs and test results that are currently available; I have reviewed the patients medication list. I have reviewed the consulting providers recommendations. I have reviewed or attempted to review medical records based upon their availability.  All of the patient's and/or family's questions have been addressed and answered to the best of my ability.  I will continue to monitor closely and make adjustments to medical management as needed.  This document was created using M*Modal Fluency Direct.  Transcription errors may have been made.  Please contact me " if any questions may rise regarding documentation to clarify transcription.      Vani Agosto MD   Internal Medicine  Department of Hospital Medicine [facility]

## 2024-05-18 NOTE — PT/OT/SLP PROGRESS
Physical Therapy Treatment Note           Name: Josep Angulo    : 1950 (73 y.o.)  MRN: [id number]           TREATMENT SUMMARY AND RECOMMENDATIONS:    PT Received On: 24  PT Start Time: 1400     PT Stop Time: 1430  PT Total Time (min): 30 min     Subjective Assessment:   No complaints  Lethargic   x Awake, alert, cooperative  Uncooperative    Agitated X- 5/10 c/o pain- R ankle    Appropriate x c/o fatigue    Confused  Treated at bedside     Emotionally labile x Treated in gym/dept.    Impulsive  Other:    Flat affect       Therapy Precautions:    Cognitive deficits  Spinal precautions    Collar - hard  Sternal precautions    Collar - soft   TLSO   x Fall risk  LSO    Hip precautions - posterior  Knee immobilizer    Hip precautions - anterior  WBAT    Impaired communication  Partial weightbearing    Oxygen  TTWB    PEG tube  NWB    Visual deficits x Other: L BKA (poor fitting prosthesis in room)    Hearing deficits          Treatment Objectives:     Mobility Training:   Assist level Comments    Bed mobility Mod I Supine>sit   Transfer MIN/CGA Stand pivot Bed>WC using RW   Gait Min A Using RW 10 ft x 3 hopping on R LE causing increased fatigue limiting distance   Sit to stand transitions Min A 2 x 5 reps using B UE from wheelchair   Sitting balance     Standing balance      Wheelchair mobility     Car transfer     Other:          Therapeutic Exercise:   Exercise Sets Reps Comments                               Additional Comments:     Assessment: Patient tolerated session well however distance with amb continues to be limited due to increased fatigue from having to hop on R LE due to poor fitting L prosthesis. R ankle hurting him     PT Plan: continue  Revisions made to plan of care: Yes, gait goal added.     GOALS:   Multidisciplinary Problems       Physical Therapy Goals          Problem: Physical Therapy    Goal Priority Disciplines Outcome Goal Variances Interventions   Physical Therapy Goal      PT, PT/OT Progressing     Description: Goals to be met by: Discharge      Patient will increase functional independence with mobility by performin. Sit to stand transfer with Supervision  2. Bed to chair transfer with Supervision using Rolling Walker  3. Wheelchair propulsion x 300 feet with Modified [place] using bilateral uppper extremities.   4. Gait x 50 feet at SPV levels using RW with or without prosthesis (currently poor fitting and needs  follow ou)                         Skilled PT Minutes Provided: 30   Communication with Treatment Team:     Equipment recommendations:       At end of treatment, patient remained:   Up in chair     Up in wheelchair in room   x In bed   x With alarm activated   x Bed rails up   x Call bell in reach     Family/friends present    Restraints secured properly    In bathroom with CNA/RN notified    Nurse aware    In gym with therapist/tech    Other:

## 2024-05-18 NOTE — PLAN OF CARE
Problem: Adult Inpatient Plan of Care  Goal: Plan of Care Review  Outcome: Progressing  Flowsheets (Taken 5/18/2024 1035)  Plan of Care Reviewed With: patient  Goal: Patient-Specific Goal (Individualized)  Outcome: Progressing  Flowsheets (Taken 5/18/2024 1035)  Individualized Care Needs: safety with mobility,  monitoring, monitor CBGs  Anxieties, Fears or Concerns: none expressed at this time  Goal: Absence of Hospital-Acquired Illness or Injury  Outcome: Progressing  Intervention: Identify and Manage Fall Risk  Flowsheets (Taken 5/18/2024 1035)  Safety Promotion/Fall Prevention:   assistive device/personal item within reach   bed alarm set   nonskid shoes/socks when out of bed   side rails raised x 3  Intervention: Prevent Skin Injury  Flowsheets (Taken 5/18/2024 1035)  Body Position:   position changed independently   weight shifting  Skin Protection: incontinence pads utilized  Device Skin Pressure Protection:   absorbent pad utilized/changed   adhesive use limited  Intervention: Prevent and Manage VTE (Venous Thromboembolism) Risk  Flowsheets (Taken 5/18/2024 1035)  VTE Prevention/Management:   bleeding precations maintained   bleeding risk assessed   fluids promoted  Intervention: Prevent Infection  Flowsheets (Taken 5/18/2024 1035)  Infection Prevention:   cohorting utilized   environmental surveillance performed   equipment surfaces disinfected   hand hygiene promoted   single patient room provided   rest/sleep promoted   personal protective equipment utilized  Goal: Optimal Comfort and Wellbeing  Outcome: Progressing  Intervention: Monitor Pain and Promote Comfort  Flowsheets (Taken 5/18/2024 1035)  Pain Management Interventions:   care clustered   pillow support provided   position adjusted   relaxation techniques promoted   quiet environment facilitated  Intervention: Provide Person-Centered Care  Flowsheets (Taken 5/18/2024 1035)  Trust Relationship/Rapport:   care explained   questions encouraged    choices provided   reassurance provided   emotional support provided   thoughts/feelings acknowledged   empathic listening provided   questions answered  Goal: Readiness for Transition of Care  Outcome: Progressing  Intervention: Mutually Develop Transition Plan  Flowsheets (Taken 5/18/2024 1035)  Communicated CIELO with patient/caregiver: Date not available/Unable to determine     Problem: Diabetes Comorbidity  Goal: Blood Glucose Level Within Targeted Range  Outcome: Progressing  Intervention: Monitor and Manage Glycemia  Flowsheets (Taken 5/18/2024 1035)  Glycemic Management: blood glucose monitored     Problem: Infection  Goal: Absence of Infection Signs and Symptoms  Outcome: Progressing  Intervention: Prevent or Manage Infection  Flowsheets (Taken 5/18/2024 1035)  Fever Reduction/Comfort Measures:   lightweight bedding   lightweight clothing  Infection Management: aseptic technique maintained  Isolation Precautions:   precautions maintained   protective     Problem: Skin Injury Risk Increased  Goal: Skin Health and Integrity  Outcome: Progressing  Intervention: Optimize Skin Protection  Flowsheets (Taken 5/18/2024 1035)  Pressure Reduction Techniques:   frequent weight shift encouraged   weight shift assistance provided  Pressure Reduction Devices: positioning supports utilized  Skin Protection: incontinence pads utilized  Activity Management: Rolling - L1  Head of Bed (HOB) Positioning: HOB at 30-45 degrees  Intervention: Promote and Optimize Oral Intake  Flowsheets (Taken 5/18/2024 1035)  Oral Nutrition Promotion: adaptive equipment use encouraged  Nutrition Interventions: food preferences provided     Problem: Fall Injury Risk  Goal: Absence of Fall and Fall-Related Injury  Outcome: Progressing  Intervention: Identify and Manage Contributors  Flowsheets (Taken 5/18/2024 1035)  Self-Care Promotion:   independence encouraged   BADL personal objects within reach   BADL personal routines maintained   meal set-up  provided   adaptive equipment use encouraged  Medication Review/Management:   medications reviewed   high-risk medications identified  Intervention: Promote Injury-Free Environment  Flowsheets (Taken 5/18/2024 1035)  Safety Promotion/Fall Prevention:   assistive device/personal item within reach   bed alarm set   nonskid shoes/socks when out of bed   side rails raised x 3     Problem: Mobility Impairment  Goal: Optimal Mobility  Outcome: Progressing  Intervention: Optimize Mobility  Flowsheets (Taken 5/18/2024 1035)  Assistive Device Utilized:   gait belt   walker  Activity Management: Rolling - L1  Positioning/Transfer Devices:   pillows   in use     Problem: Wound  Goal: Optimal Coping  Outcome: Progressing  Intervention: Support Patient and Family Response  Flowsheets (Taken 5/18/2024 1035)  Supportive Measures:   active listening utilized   relaxation techniques promoted   self-care encouraged   positive reinforcement provided  Family/Support System Care: self-care encouraged  Goal: Optimal Functional Ability  Outcome: Progressing  Intervention: Optimize Functional Ability  Flowsheets (Taken 5/18/2024 1035)  Assistive Device Utilized:   gait belt   walker  Activity Management: Rolling - L1  Activity Assistance Provided: assistance, 2 people  Goal: Absence of Infection Signs and Symptoms  Outcome: Progressing  Intervention: Prevent or Manage Infection  Flowsheets (Taken 5/18/2024 1035)  Fever Reduction/Comfort Measures:   lightweight bedding   lightweight clothing  Infection Management: aseptic technique maintained  Isolation Precautions:   precautions maintained   protective  Goal: Improved Oral Intake  Outcome: Progressing  Intervention: Promote and Optimize Oral Intake  Flowsheets (Taken 5/18/2024 1035)  Oral Nutrition Promotion: adaptive equipment use encouraged  Nutrition Interventions: food preferences provided  Goal: Optimal Pain Control and Function  Outcome: Progressing  Intervention: Prevent or Manage  Pain  Flowsheets (Taken 5/18/2024 1035)  Sleep/Rest Enhancement:   awakenings minimized   consistent schedule promoted   regular sleep/rest pattern promoted  Pain Management Interventions:   care clustered   pillow support provided   position adjusted   relaxation techniques promoted   quiet environment facilitated  Goal: Skin Health and Integrity  Outcome: Progressing  Intervention: Optimize Skin Protection  Flowsheets (Taken 5/18/2024 1035)  Pressure Reduction Techniques:   frequent weight shift encouraged   weight shift assistance provided  Pressure Reduction Devices: positioning supports utilized  Skin Protection: incontinence pads utilized  Activity Management: Rolling - L1  Head of Bed (HOB) Positioning: HOB at 30-45 degrees  Goal: Optimal Wound Healing  Outcome: Progressing  Intervention: Promote Wound Healing  Flowsheets (Taken 5/18/2024 1035)  Sleep/Rest Enhancement:   awakenings minimized   consistent schedule promoted   regular sleep/rest pattern promoted

## 2024-05-19 LAB
GLUCOSE SERPL-MCNC: 176 MG/DL (ref 70–110)
GLUCOSE SERPL-MCNC: 239 MG/DL (ref 70–110)
POCT GLUCOSE: 176 MG/DL (ref 70–110)
POCT GLUCOSE: 200 MG/DL (ref 70–110)
POCT GLUCOSE: 203 MG/DL (ref 70–110)
POCT GLUCOSE: 239 MG/DL (ref 70–110)

## 2024-05-19 PROCEDURE — 63600175 PHARM REV CODE 636 W HCPCS: Performed by: PHYSICIAN ASSISTANT

## 2024-05-19 PROCEDURE — 99900035 HC TECH TIME PER 15 MIN (STAT)

## 2024-05-19 PROCEDURE — 11000001 HC ACUTE MED/SURG PRIVATE ROOM

## 2024-05-19 PROCEDURE — 25000003 PHARM REV CODE 250

## 2024-05-19 PROCEDURE — 94760 N-INVAS EAR/PLS OXIMETRY 1: CPT

## 2024-05-19 PROCEDURE — 25000003 PHARM REV CODE 250: Performed by: PHYSICIAN ASSISTANT

## 2024-05-19 RX ADMIN — CITALOPRAM HYDROBROMIDE 10 MG: 10 TABLET ORAL at 08:05

## 2024-05-19 RX ADMIN — MIRTAZAPINE 15 MG: 7.5 TABLET, FILM COATED ORAL at 08:05

## 2024-05-19 RX ADMIN — LISINOPRIL 10 MG: 10 TABLET ORAL at 08:05

## 2024-05-19 RX ADMIN — ENOXAPARIN SODIUM 40 MG: 40 INJECTION SUBCUTANEOUS at 05:05

## 2024-05-19 RX ADMIN — METFORMIN HYDROCHLORIDE 500 MG: 500 TABLET ORAL at 08:05

## 2024-05-19 RX ADMIN — INSULIN ASPART 1 UNITS: 100 INJECTION, SOLUTION INTRAVENOUS; SUBCUTANEOUS at 09:05

## 2024-05-19 RX ADMIN — TAMSULOSIN HYDROCHLORIDE 0.4 MG: 0.4 CAPSULE ORAL at 08:05

## 2024-05-19 RX ADMIN — INSULIN ASPART 2 UNITS: 100 INJECTION, SOLUTION INTRAVENOUS; SUBCUTANEOUS at 11:05

## 2024-05-19 RX ADMIN — FERROUS SULFATE TAB 325 MG (65 MG ELEMENTAL FE) 1 EACH: 325 (65 FE) TAB at 08:05

## 2024-05-19 RX ADMIN — ASPIRIN 81 MG: 81 TABLET, COATED ORAL at 08:05

## 2024-05-19 NOTE — PROGRESS NOTES
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 Daughter reports patient was unable to follow commands and falling asleep easily. Upon arrival to the ER, vitals revealed elevated temperature reading and mildly elevated BP. Labs were significant for leukocytosis, hypoglycemia with a glucose of 28, elevated CRP. Patient was given a D10 infusion with improvement of glucose to 126. CXR revealed prominent interstitial markings with no focal opacification, no acute cardiopulmonary process appreciated. CT head revealed no acute intracranial abnormality identified with chronic microvascular ischemic disease. Daughter at bedside brought patient's home medication bottles. Patient had 4 bottles of Metformin 1000mg BID which daughter reports patient only takes once daily and 3 bottles of Glimepiride 2mg BID. Daughter reports she gets help from a nurse who comes to her home to organize the patient's medication therefore she does not believe he could have taken too much medication. On exam, CBG registered as <20. He did receive a one time dose of Octreotide 50mcg and D5NS at 100cc/hr. Patient is being admitted to hospital medicine service.     Today:  No reported complaints today.     OBJECTIVE/PHYSICAL EXAM     VITAL SIGNS (MOST RECENT):  Temp: 97.2 °F (36.2 °C) (05/19/24 0731)  Pulse: 76 (05/19/24 0731)  Resp: 18 (05/18/24 1912)  BP: 138/81 (05/19/24 0731)  SpO2: 96 % (05/19/24 0731) VITAL SIGNS (24 HOUR RANGE):  Temp:  [97.2 °F (36.2 °C)-98.4 °F (36.9 °C)] 97.2 °F (36.2 °C)  Pulse:  [76-93] 76  Resp:  [18] 18  SpO2:  [95 %-97 %] 96 %  BP: (129-165)/(68-81) 138/81     GENERAL: In no acute  "distress, afebrile  HEENT: Atraumatic, normocephalic,  CHEST: Unlabored  HEART: S1, S2  ABDOMEN: Non distended      ASSESSMENT/PLAN     Sulfonylurea induced hypoglycemia   Uncontrolled DM2   Hold home Glimepiride   Received D10 in ER   Received Octreotide 50mcg  ACHS CBGs   Hypoglycemic protocol  A1c 6%   Continue Metformin at a decreased dose of 500mg BID      Acute metabolic encephalopathy - resolved   2/2 hypoglycemia and receiving sedating medications in the ER   See above treatment  Hold sedating medications   CT head revealed no acute intracranial abnormality identified with chronic microvascular ischemic disease     Leukocytosis - resolved   UA without UTI   CXR without acute findings     Covid, flu, RSV negative      HTN  Continue home Lisinopril increased to 10mg on 05/16/2024     BPH  Continue home Flomax     Iron deficiency   Continue home iron supplementation     Bipolar disorder, insomnia, dementia  Continue home Mirtazapine when patient appropriate   Daughter requesting NH placement at [facility] consulted      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   PT/OT eval       DVT prophylaxis:  Lovenox     Anticipated discharge and disposition: Continue to monitor glucose, continue PT/OT, family requesting NH placement   __________________________________________________________________________    LABS/MICRO/MEDS/DIAGNOSTICS     LABS  No results for input(s): "NA", "K", "CHLORIDE", "CO2", "BUN", "CREATININE", "GLUCOSE", "CALCIUM", "ALKPHOS", "AST", "ALT", "ALBUMIN" in the last 72 hours.    No results for input(s): "WBC", "RBC", "HCT", "MCV", "PLT" in the last 72 hours.    Invalid input(s): "HG"    MICROBIOLOGY  Microbiology Results (last 7 days)       Procedure Component Value Units Date/Time    Blood Culture [[id number]]  (Normal) Collected: 05/13/24 1436    Order Status: Completed Specimen: Blood Updated: 05/18/24 1800     Blood Culture No Growth at 5 days    Blood Culture [[id number]]  (Normal) " "Collected: 05/13/24 1436    Order Status: Completed Specimen: Blood Updated: 05/18/24 1800     Blood Culture No Growth at 5 days             MEDICATIONS   aspirin  81 mg Oral Daily    citalopram  10 mg Oral Daily    enoxparin  40 mg Subcutaneous Daily    ferrous sulfate  1 tablet Oral Daily    lisinopriL  10 mg Oral Daily    metFORMIN  500 mg Oral BID    mirtazapine  15 mg Oral QHS    tamsulosin  0.4 mg Oral Daily         INFUSIONS       DIAGNOSTIC TESTS  CT Head Without Contrast   Final Result      No acute intracranial abnormality identified.  Findings of chronic microvascular ischemic disease.         Electronically signed by: Jeffrey Gamez   Date:    05/13/2024   Time:    13:35      X-Ray Chest 1 View   Final Result      Prominent interstitial markings with no focal opacification.  No acute cardiopulmonary process appreciated.         Electronically signed by: Jeffrey Gamez   Date:    05/13/2024   Time:    11:11         No results found for: "EF"     NUTRITION STATUS  Patient meets ASPEN criteria for   malnutrition of other (see comments) (doesn't meet criteria) per RD assessment as evidenced by:                       A minimum of two characteristics is recommended for diagnosis of either severe or non-severe malnutrition.     Case related differential diagnoses have been reviewed; assessment and plan has been documented. I have personally reviewed the labs and test results that are currently available; I have reviewed the patients medication list. I have reviewed the consulting providers recommendations. I have reviewed or attempted to review medical records based upon their availability.  All of the patient's and/or family's questions have been addressed and answered to the best of my ability.  I will continue to monitor closely and make adjustments to medical management as needed.  This document was created using M*Modal Fluency Direct.  Transcription errors may have been made.  Please contact me if any " questions may rise regarding documentation to clarify transcription.      Vani Agosto MD   Internal Medicine  Department of Hospital Medicine [facility]

## 2024-05-19 NOTE — PLAN OF CARE
[facility]  Discharge Reassessment    Primary Care Provider: Sami Rothman MD    Expected Discharge Date:     Reassessment (most recent)       Discharge Reassessment - 05/19/24 1841          Discharge Reassessment    Assessment Type Discharge Planning Reassessment     Did the patient's condition or plan change since previous assessment? No     Discharge Plan discussed with: Adult children     Communicated CIELO with patient/caregiver Date not available/Unable to determine     Discharge Plan A New Nursing Home placement - residential care facility     DME Needed Upon Discharge  none     Transition of Care Barriers None     Why the patient remains in the hospital Requires continued medical care        Post-Acute Status    Post-Acute Authorization Placement     Post-Acute Placement Status Referrals Sent     Hospital Resources/Appts/Education Provided Provided patient/caregiver with written discharge plan information     Patient choice form signed by patient/caregiver List with quality metrics by geographic area provided     Discharge Delays None known at this time

## 2024-05-19 NOTE — PLAN OF CARE
Problem: Adult Inpatient Plan of Care  Goal: Plan of Care Review  Outcome: Progressing  Flowsheets (Taken 5/18/2024 2035)  Plan of Care Reviewed With: patient  Goal: Patient-Specific Goal (Individualized)  Outcome: Progressing  Flowsheets (Taken 5/18/2024 2035)  Individualized Care Needs:   Safety with mobility   Monitor for s/s of infection   Monitor blood glucose levels.  Anxieties, Fears or Concerns: None offered at htis time.  Patient/Family-Specific Goals (Include Timeframe): Return to optimal state of health by CIELO for discharge to healthcare facility.  Goal: Absence of Hospital-Acquired Illness or Injury  Outcome: Progressing  Intervention: Identify and Manage Fall Risk  Flowsheets (Taken 5/18/2024 2035)  Safety Promotion/Fall Prevention:   bed alarm set   assistive device/personal item within reach   Fall Risk reviewed with patient/family   Fall Risk signage in place   gait belt with ambulation   high risk medications identified   medications reviewed   lighting adjusted   room near unit station   instructed to call staff for mobility  Intervention: Prevent Skin Injury  Flowsheets (Taken 5/18/2024 2035)  Skin Protection:   incontinence pads utilized   skin sealant/moisture barrier applied  Device Skin Pressure Protection:   absorbent pad utilized/changed   tubing/devices free from skin contact  Intervention: Prevent Infection  Flowsheets (Taken 5/18/2024 2035)  Infection Prevention:   environmental surveillance performed   equipment surfaces disinfected   hand hygiene promoted   personal protective equipment utilized   rest/sleep promoted  Goal: Optimal Comfort and Wellbeing  Outcome: Progressing     Problem: Diabetes Comorbidity  Goal: Blood Glucose Level Within Targeted Range  Outcome: Progressing     Problem: Infection  Goal: Absence of Infection Signs and Symptoms  Outcome: Progressing     Problem: Skin Injury Risk Increased  Goal: Skin Health and Integrity  Outcome: Progressing     Problem: Fall  Injury Risk  Goal: Absence of Fall and Fall-Related Injury  Outcome: Progressing  Intervention: Identify and Manage Contributors  Flowsheets (Taken 5/18/2024 2035)  Medication Review/Management:   medications reviewed   high-risk medications identified  Intervention: Promote Injury-Free Environment  Flowsheets (Taken 5/18/2024 2035)  Safety Promotion/Fall Prevention:   bed alarm set   assistive device/personal item within reach   Fall Risk reviewed with patient/family   Fall Risk signage in place   gait belt with ambulation   high risk medications identified   medications reviewed   lighting adjusted   room near unit station   instructed to call staff for mobility     Problem: Mobility Impairment  Goal: Optimal Mobility  Outcome: Progressing     Problem: Wound  Goal: Absence of Infection Signs and Symptoms  Outcome: Progressing

## 2024-05-19 NOTE — PLAN OF CARE
Problem: Adult Inpatient Plan of Care  Goal: Plan of Care Review  Outcome: Progressing  Flowsheets (Taken 5/19/2024 0956)  Plan of Care Reviewed With: patient  Goal: Patient-Specific Goal (Individualized)  Outcome: Progressing  Flowsheets (Taken 5/19/2024 0956)  Individualized Care Needs: safety with mobility, monitor for s/s of infection, monitor CBG  Anxieties, Fears or Concerns: none expressed at this time  Goal: Absence of Hospital-Acquired Illness or Injury  Outcome: Progressing  Intervention: Identify and Manage Fall Risk  Flowsheets (Taken 5/19/2024 0956)  Safety Promotion/Fall Prevention:   assistive device/personal item within reach   bed alarm set   nonskid shoes/socks when out of bed   side rails raised x 3  Intervention: Prevent Skin Injury  Flowsheets (Taken 5/19/2024 0956)  Body Position: position changed independently  Skin Protection: incontinence pads utilized  Device Skin Pressure Protection:   absorbent pad utilized/changed   tubing/devices free from skin contact  Intervention: Prevent and Manage VTE (Venous Thromboembolism) Risk  Flowsheets (Taken 5/19/2024 0956)  VTE Prevention/Management:   bleeding precations maintained   bleeding risk assessed   fluids promoted  Intervention: Prevent Infection  Flowsheets (Taken 5/19/2024 0956)  Infection Prevention:   cohorting utilized   environmental surveillance performed   equipment surfaces disinfected   hand hygiene promoted   single patient room provided   rest/sleep promoted   personal protective equipment utilized  Goal: Optimal Comfort and Wellbeing  Outcome: Progressing  Intervention: Monitor Pain and Promote Comfort  Flowsheets (Taken 5/19/2024 0956)  Pain Management Interventions:   care clustered   pillow support provided   position adjusted   relaxation techniques promoted   quiet environment facilitated  Intervention: Provide Person-Centered Care  Flowsheets (Taken 5/19/2024 0956)  Trust Relationship/Rapport:   care explained   questions  encouraged   choices provided   reassurance provided   emotional support provided   thoughts/feelings acknowledged   empathic listening provided   questions answered  Goal: Readiness for Transition of Care  Outcome: Progressing  Intervention: Mutually Develop Transition Plan  Flowsheets (Taken 5/19/2024 0956)  Communicated CIELO with patient/caregiver: Date not available/Unable to determine     Problem: Diabetes Comorbidity  Goal: Blood Glucose Level Within Targeted Range  Outcome: Progressing  Intervention: Monitor and Manage Glycemia  Flowsheets (Taken 5/19/2024 0956)  Glycemic Management: blood glucose monitored     Problem: Infection  Goal: Absence of Infection Signs and Symptoms  Outcome: Progressing  Intervention: Prevent or Manage Infection  Flowsheets (Taken 5/19/2024 0956)  Fever Reduction/Comfort Measures:   lightweight bedding   lightweight clothing  Infection Management: aseptic technique maintained  Isolation Precautions:   precautions maintained   protective     Problem: Skin Injury Risk Increased  Goal: Skin Health and Integrity  Outcome: Progressing  Intervention: Optimize Skin Protection  Flowsheets (Taken 5/19/2024 0956)  Pressure Reduction Techniques:   frequent weight shift encouraged   weight shift assistance provided  Pressure Reduction Devices: positioning supports utilized  Skin Protection: incontinence pads utilized  Activity Management: Rolling - L1  Head of Bed (HOB) Positioning: HOB at 30-45 degrees  Intervention: Promote and Optimize Oral Intake  Flowsheets (Taken 5/19/2024 0956)  Oral Nutrition Promotion:   adaptive equipment use encouraged   calorie-dense foods provided   calorie-dense liquids provided     Problem: Fall Injury Risk  Goal: Absence of Fall and Fall-Related Injury  Outcome: Progressing  Intervention: Identify and Manage Contributors  Flowsheets (Taken 5/19/2024 0956)  Self-Care Promotion:   independence encouraged   BADL personal objects within reach   BADL personal  routines maintained   meal set-up provided   adaptive equipment use encouraged  Medication Review/Management:   medications reviewed   high-risk medications identified  Intervention: Promote Injury-Free Environment  Flowsheets (Taken 5/19/2024 0956)  Safety Promotion/Fall Prevention:   assistive device/personal item within reach   bed alarm set   nonskid shoes/socks when out of bed   side rails raised x 3     Problem: Mobility Impairment  Goal: Optimal Mobility  Outcome: Progressing  Intervention: Optimize Mobility  Flowsheets (Taken 5/19/2024 0956)  Assistive Device Utilized:   gait belt   walker  Activity Management: Rolling - L1  Positioning/Transfer Devices:   pillows   in use     Problem: Wound  Goal: Optimal Coping  Outcome: Progressing  Intervention: Support Patient and Family Response  Flowsheets (Taken 5/19/2024 0956)  Supportive Measures:   active listening utilized   relaxation techniques promoted  Family/Support System Care: self-care encouraged  Goal: Optimal Functional Ability  Outcome: Progressing  Intervention: Optimize Functional Ability  Flowsheets (Taken 5/19/2024 0956)  Assistive Device Utilized:   gait belt   walker  Activity Management: Rolling - L1  Activity Assistance Provided: assistance, 1 person  Goal: Absence of Infection Signs and Symptoms  Outcome: Progressing  Intervention: Prevent or Manage Infection  Flowsheets (Taken 5/19/2024 0956)  Fever Reduction/Comfort Measures:   lightweight bedding   lightweight clothing  Infection Management: aseptic technique maintained  Isolation Precautions:   precautions maintained   protective  Goal: Improved Oral Intake  Outcome: Progressing  Intervention: Promote and Optimize Oral Intake  Flowsheets (Taken 5/19/2024 0956)  Oral Nutrition Promotion:   adaptive equipment use encouraged   calorie-dense foods provided   calorie-dense liquids provided  Goal: Optimal Pain Control and Function  Outcome: Progressing  Intervention: Prevent or Manage  Pain  Flowsheets (Taken 5/19/2024 0956)  Sleep/Rest Enhancement:   awakenings minimized   consistent schedule promoted   regular sleep/rest pattern promoted   relaxation techniques promoted  Pain Management Interventions:   care clustered   pillow support provided   position adjusted   relaxation techniques promoted   quiet environment facilitated  Goal: Skin Health and Integrity  Outcome: Progressing  Intervention: Optimize Skin Protection  Flowsheets (Taken 5/19/2024 0956)  Pressure Reduction Techniques:   frequent weight shift encouraged   weight shift assistance provided  Pressure Reduction Devices: positioning supports utilized  Skin Protection: incontinence pads utilized  Activity Management: Rolling - L1  Head of Bed (HOB) Positioning: HOB at 30-45 degrees  Goal: Optimal Wound Healing  Outcome: Progressing  Intervention: Promote Wound Healing  Flowsheets (Taken 5/19/2024 0956)  Sleep/Rest Enhancement:   awakenings minimized   consistent schedule promoted   regular sleep/rest pattern promoted   relaxation techniques promoted

## 2024-05-20 LAB
ANION GAP SERPL CALC-SCNC: 7 MEQ/L
BASOPHILS # BLD AUTO: 0.07 X10(3)/MCL
BASOPHILS NFR BLD AUTO: 0.8 %
BUN SERPL-MCNC: 18.4 MG/DL (ref 8.4–25.7)
CALCIUM SERPL-MCNC: 9.1 MG/DL (ref 8.8–10)
CHLORIDE SERPL-SCNC: 99 MMOL/L (ref 98–107)
CO2 SERPL-SCNC: 27 MMOL/L (ref 23–31)
CREAT SERPL-MCNC: 1.11 MG/DL (ref 0.73–1.18)
CREAT/UREA NIT SERPL: 17
EOSINOPHIL # BLD AUTO: 0.27 X10(3)/MCL (ref 0–0.9)
EOSINOPHIL NFR BLD AUTO: 2.9 %
ERYTHROCYTE [DISTWIDTH] IN BLOOD BY AUTOMATED COUNT: 13.4 % (ref 11.5–17)
GFR SERPLBLD CREATININE-BSD FMLA CKD-EPI: >60 ML/MIN/1.73/M2
GLUCOSE SERPL-MCNC: 170 MG/DL (ref 82–115)
HCT VFR BLD AUTO: 36.2 % (ref 42–52)
HGB BLD-MCNC: 12 G/DL (ref 14–18)
IMM GRANULOCYTES # BLD AUTO: 0.1 X10(3)/MCL (ref 0–0.04)
IMM GRANULOCYTES NFR BLD AUTO: 1.1 %
LYMPHOCYTES # BLD AUTO: 1.73 X10(3)/MCL (ref 0.6–4.6)
LYMPHOCYTES NFR BLD AUTO: 18.7 %
MCH RBC QN AUTO: 28 PG (ref 27–31)
MCHC RBC AUTO-ENTMCNC: 33.1 G/DL (ref 33–36)
MCV RBC AUTO: 84.4 FL (ref 80–94)
MONOCYTES # BLD AUTO: 0.72 X10(3)/MCL (ref 0.1–1.3)
MONOCYTES NFR BLD AUTO: 7.8 %
NEUTROPHILS # BLD AUTO: 6.37 X10(3)/MCL (ref 2.1–9.2)
NEUTROPHILS NFR BLD AUTO: 68.7 %
PLATELET # BLD AUTO: 381 X10(3)/MCL (ref 130–400)
PMV BLD AUTO: 9.4 FL (ref 7.4–10.4)
POCT GLUCOSE: 132 MG/DL (ref 70–110)
POCT GLUCOSE: 182 MG/DL (ref 70–110)
POCT GLUCOSE: 208 MG/DL (ref 70–110)
POTASSIUM SERPL-SCNC: 5 MMOL/L (ref 3.5–5.1)
RBC # BLD AUTO: 4.29 X10(6)/MCL (ref 4.7–6.1)
SODIUM SERPL-SCNC: 133 MMOL/L (ref 136–145)
WBC # SPEC AUTO: 9.26 X10(3)/MCL (ref 4.5–11.5)

## 2024-05-20 PROCEDURE — 94760 N-INVAS EAR/PLS OXIMETRY 1: CPT

## 2024-05-20 PROCEDURE — 11000001 HC ACUTE MED/SURG PRIVATE ROOM

## 2024-05-20 PROCEDURE — 99900035 HC TECH TIME PER 15 MIN (STAT)

## 2024-05-20 PROCEDURE — 36415 COLL VENOUS BLD VENIPUNCTURE: CPT | Performed by: PHYSICIAN ASSISTANT

## 2024-05-20 PROCEDURE — 97530 THERAPEUTIC ACTIVITIES: CPT | Mod: CQ

## 2024-05-20 PROCEDURE — 80048 BASIC METABOLIC PNL TOTAL CA: CPT | Performed by: PHYSICIAN ASSISTANT

## 2024-05-20 PROCEDURE — 97110 THERAPEUTIC EXERCISES: CPT | Mod: CQ

## 2024-05-20 PROCEDURE — 63600175 PHARM REV CODE 636 W HCPCS: Performed by: PHYSICIAN ASSISTANT

## 2024-05-20 PROCEDURE — 97530 THERAPEUTIC ACTIVITIES: CPT

## 2024-05-20 PROCEDURE — 85025 COMPLETE CBC W/AUTO DIFF WBC: CPT | Performed by: PHYSICIAN ASSISTANT

## 2024-05-20 PROCEDURE — 97116 GAIT TRAINING THERAPY: CPT | Mod: CQ

## 2024-05-20 PROCEDURE — 25000003 PHARM REV CODE 250

## 2024-05-20 PROCEDURE — 97110 THERAPEUTIC EXERCISES: CPT

## 2024-05-20 PROCEDURE — 25000003 PHARM REV CODE 250: Performed by: PHYSICIAN ASSISTANT

## 2024-05-20 PROCEDURE — 97535 SELF CARE MNGMENT TRAINING: CPT

## 2024-05-20 RX ORDER — LISINOPRIL 20 MG/1
20 TABLET ORAL DAILY
Status: DISCONTINUED | OUTPATIENT
Start: 2024-05-21 | End: 2024-05-21 | Stop reason: HOSPADM

## 2024-05-20 RX ORDER — METFORMIN HYDROCHLORIDE 500 MG/1
1000 TABLET ORAL 2 TIMES DAILY
Status: DISCONTINUED | OUTPATIENT
Start: 2024-05-20 | End: 2024-05-21 | Stop reason: HOSPADM

## 2024-05-20 RX ADMIN — ENOXAPARIN SODIUM 40 MG: 40 INJECTION SUBCUTANEOUS at 05:05

## 2024-05-20 RX ADMIN — INSULIN ASPART 2 UNITS: 100 INJECTION, SOLUTION INTRAVENOUS; SUBCUTANEOUS at 01:05

## 2024-05-20 RX ADMIN — MIRTAZAPINE 15 MG: 7.5 TABLET, FILM COATED ORAL at 09:05

## 2024-05-20 RX ADMIN — ASPIRIN 81 MG: 81 TABLET, COATED ORAL at 08:05

## 2024-05-20 RX ADMIN — FERROUS SULFATE TAB 325 MG (65 MG ELEMENTAL FE) 1 EACH: 325 (65 FE) TAB at 08:05

## 2024-05-20 RX ADMIN — TAMSULOSIN HYDROCHLORIDE 0.4 MG: 0.4 CAPSULE ORAL at 08:05

## 2024-05-20 RX ADMIN — METFORMIN HYDROCHLORIDE 1000 MG: 500 TABLET ORAL at 08:05

## 2024-05-20 RX ADMIN — CITALOPRAM HYDROBROMIDE 10 MG: 10 TABLET ORAL at 08:05

## 2024-05-20 RX ADMIN — LISINOPRIL 10 MG: 10 TABLET ORAL at 08:05

## 2024-05-20 RX ADMIN — METFORMIN HYDROCHLORIDE 1000 MG: 500 TABLET ORAL at 09:05

## 2024-05-20 NOTE — PLAN OF CARE
Problem: Adult Inpatient Plan of Care  Goal: Plan of Care Review  Outcome: Progressing  Flowsheets (Taken 5/19/2024 2035)  Plan of Care Reviewed With: patient  Goal: Patient-Specific Goal (Individualized)  Outcome: Progressing  Flowsheets (Taken 5/19/2024 2035)  Individualized Care Needs:   Safety with mobility   Monitor for s/s of infection   Monitor blood glucose levels.  Anxieties, Fears or Concerns: None offered at this time.  Patient/Family-Specific Goals (Include Timeframe): Return to optimal state of health for discharge to healthcare facility.  Goal: Absence of Hospital-Acquired Illness or Injury  Outcome: Progressing  Intervention: Prevent Skin Injury  Flowsheets (Taken 5/19/2024 2035)  Skin Protection:   incontinence pads utilized   skin sealant/moisture barrier applied  Device Skin Pressure Protection: absorbent pad utilized/changed  Intervention: Prevent and Manage VTE (Venous Thromboembolism) Risk  Flowsheets (Taken 5/19/2024 2035)  VTE Prevention/Management: (On Lovenox)   bleeding risk assessed   bleeding precations maintained   dorsiflexion/plantar flexion performed   fluids promoted   ROM (active) performed  Goal: Optimal Comfort and Wellbeing  Outcome: Progressing  Intervention: Provide Person-Centered Care  Flowsheets (Taken 5/19/2024 2035)  Trust Relationship/Rapport:   care explained   choices provided   emotional support provided   empathic listening provided   questions encouraged   questions answered   reassurance provided   thoughts/feelings acknowledged     Problem: Diabetes Comorbidity  Goal: Blood Glucose Level Within Targeted Range  Outcome: Progressing  Intervention: Monitor and Manage Glycemia  Flowsheets (Taken 5/19/2024 2035)  Glycemic Management: blood glucose monitored     Problem: Infection  Goal: Absence of Infection Signs and Symptoms  Outcome: Progressing  Intervention: Prevent or Manage Infection  Flowsheets (Taken 5/19/2024 2035)  Infection Management: aseptic technique  maintained     Problem: Skin Injury Risk Increased  Goal: Skin Health and Integrity  Outcome: Progressing     Problem: Fall Injury Risk  Goal: Absence of Fall and Fall-Related Injury  Outcome: Progressing  Intervention: Identify and Manage Contributors  Flowsheets (Taken 5/19/2024 2035)  Self-Care Promotion:   independence encouraged   BADL personal objects within reach   BADL personal routines maintained  Medication Review/Management:   medications reviewed   high-risk medications identified     Problem: Mobility Impairment  Goal: Optimal Mobility  Outcome: Progressing  Intervention: Optimize Mobility  Flowsheets (Taken 5/19/2024 2035)  Activity Management: Up in chair - L3

## 2024-05-20 NOTE — PT/OT/SLP PROGRESS
Physical Therapy Treatment Note           Name: Josep Angulo    : 1950 (73 y.o.)  MRN: [id number]           TREATMENT SUMMARY AND RECOMMENDATIONS:    PT Received On: 24  PT Start Time: 1300     PT Stop Time: 1325  PT Total Time (min): 25 min     Subjective Assessment:  x No complaints  Lethargic    Awake, alert, cooperative  Uncooperative    Agitated  c/o pain    Appropriate  c/o fatigue    Confused  Treated at bedside     Emotionally labile  Treated in gym/dept.    Impulsive  Other:    Flat affect       Therapy Precautions:    Cognitive deficits  Spinal precautions    Collar - hard  Sternal precautions    Collar - soft   TLSO    Fall risk  LSO    Hip precautions - posterior  Knee immobilizer    Hip precautions - anterior  WBAT    Impaired communication  Partial weightbearing    Oxygen  TTWB    PEG tube  NWB    Visual deficits  Other:    Hearing deficits          Treatment Objectives:     Mobility Training:   Assist level Comments    Bed mobility     Transfer     Gait Anay Amb on firm surface with RW 20 ft x 2    Sit to stand transitions CGA Sit-stand 10 reps x 2 with B UE use   Sitting balance     Standing balance      Wheelchair mobility     Car transfer     Other:          Therapeutic Exercise:   Exercise Sets Reps Comments   B LE exer sitting  1 20  In all planes to promote muscle strength and ROM    L LE exer standing  2 10 B UE supported- abd/add, knee lifts                    Additional Comments:  No issues noted      Assessment: Patient tolerated session well    PT Plan: continue  Revisions made to plan of care: No    GOALS:   Multidisciplinary Problems       Physical Therapy Goals          Problem: Physical Therapy    Goal Priority Disciplines Outcome Goal Variances Interventions   Physical Therapy Goal     PT, PT/OT Progressing     Description: Goals to be met by: Discharge      Patient will increase functional independence with mobility by performin. Sit to stand transfer  with Supervision  2. Bed to chair transfer with Supervision using Rolling Walker  3. Wheelchair propulsion x 300 feet with Modified [place] using bilateral uppper extremities.   4. Gait x 50 feet at SPV levels using RW with or without prosthesis (currently poor fitting and needs  follow ou)                         Skilled PT Minutes Provided: 25   Communication with Treatment Team:     Equipment recommendations:       At end of treatment, patient remained:   Up in chair     Up in wheelchair in room    In bed    With alarm activated    Bed rails up    Call bell in reach     Family/friends present    Restraints secured properly    In bathroom with CNA/RN notified    Nurse aware   x In gym with therapist/tech    Other:

## 2024-05-20 NOTE — PT/OT/SLP PROGRESS
Physical Therapy Treatment Note           Name: Josep Angulo    : 1950 (73 y.o.)  MRN: [id number]           TREATMENT SUMMARY AND RECOMMENDATIONS:    PT Received On: 24  PT Start Time: 930     PT Stop Time: 955  PT Total Time (min): 25 min     Subjective Assessment:  x No complaints  Lethargic   x Awake, alert, cooperative  Uncooperative    Agitated  c/o pain    Appropriate  c/o fatigue    Confused  Treated at bedside     Emotionally labile x Treated in gym/dept.    Impulsive  Other:    Flat affect       Therapy Precautions:    Cognitive deficits  Spinal precautions    Collar - hard  Sternal precautions    Collar - soft   TLSO    Fall risk  LSO    Hip precautions - posterior  Knee immobilizer    Hip precautions - anterior  WBAT    Impaired communication  Partial weightbearing    Oxygen  TTWB    PEG tube  NWB    Visual deficits  Other:    Hearing deficits          Treatment Objectives:     Mobility Training:   Assist level Comments    Bed mobility     Transfer CGA [id number] towrds R side   Gait     Sit to stand transitions CGA Sit-stand 5 reps x 2 with B UE use   Sitting balance     Standing balance      Wheelchair mobility     Car transfer     Other:          Therapeutic Exercise:   Exercise Sets Reps Comments   B LE exer sitting  2 20 Ankle pumps, TKE, abd/add, knee lifts    UBE Level 1 10 min UBE with R LE use                    Additional Comments:  No issues noted     Assessment: Patient tolerated session well    PT Plan: continue  Revisions made to plan of care: No    GOALS:   Multidisciplinary Problems       Physical Therapy Goals          Problem: Physical Therapy    Goal Priority Disciplines Outcome Goal Variances Interventions   Physical Therapy Goal     PT, PT/OT Progressing     Description: Goals to be met by: Discharge      Patient will increase functional independence with mobility by performin. Sit to stand transfer with Supervision  2. Bed to chair transfer  with Supervision using Rolling Walker  3. Wheelchair propulsion x 300 feet with Modified [place] using bilateral uppper extremities.   4. Gait x 50 feet at SPV levels using RW with or without prosthesis (currently poor fitting and needs  follow ou)                         Skilled PT Minutes Provided: 25   Communication with Treatment Team:     Equipment recommendations:       At end of treatment, patient remained:  x Up in chair     Up in wheelchair in room    In bed    With alarm activated    Bed rails up   x Call bell in reach     Family/friends present    Restraints secured properly    In bathroom with CNA/RN notified    Nurse aware    In gym with therapist/tech   x Other:awaiting OT for shower

## 2024-05-20 NOTE — PT/OT/SLP PROGRESS
Occupational Therapy  Treatment    Name: Josep Angulo    : 1950 (73 y.o.)  MRN: [id number]           TREATMENT SUMMARY AND RECOMMENDATIONS:      OT Date of Treatment: 24  OT Start Time: 1330  OT Stop Time: 1354  OT Total Time (min): 24 min      Subjective Assessment:  x No complaints  Lethargic   x Awake, alert, cooperative  Impulsive    Uncooperative   Flat affect    Agitated  c/o pain    Appropriate  c/o fatigue    Confused  Treated at bedside     Emotionally labile x Treated in gym/dept.      Other:        Therapy Precautions:    Cognitive deficits  Spinal precautions    Collar - hard  Sternal precautions    Collar - soft   TLSO   x Fall risk  LSO    Hip precautions - posterior  Knee immobilizer    Hip precautions - anterior  WBAT    Impaired communication  Partial weightbearing    Oxygen  TTWB    PEG tube  NWB    Visual deficits      Hearing deficits   Other:        Treatment Objectives:     Mobility Training:    Mobility task Assist level Comments    Bed mobility SBA EOB>supine   Transfer SBA Modified squat/pivot t/f w/c>EOB using bed rail to R side   Sit to stands transitions CGA/SBA From w/c level   Functional mobility     Sitting balance     Standing balance  CGA Pt stood with RW anterior and 1UE supported; performed functional reaching in various planes/levels to grasp bean bags and then toss into bucket anterior. No LOB noted. Pt able to maintain standing tolerance ~1-2 min per round.   Other: w/c mobility SBA/CGA X175 feet with BUEs; occasional CGA for veering R<>L       Therapeutic Exercise:   Exercise Sets Reps Comments   Arm bike 1 5 min Level 1; forwards - rest breaks as needed.                          Assessment: Patient tolerated session well. Pt had positive response to today's treatment. Pt continues to make good progress towards goals. Pt would continue to benefit from skilled OT services to improve pt's safety and independence with daily occupations, decrease caregiver burden,  and reduce pt's risk of falls.     GOALS:   Multidisciplinary Problems       Occupational Therapy Goals          Problem: Occupational Therapy    Goal Priority Disciplines Outcome Interventions   Occupational Therapy Goal     OT, PT/OT Progressing    Description: Goals to be met by: 5/28/24     Patient will increase functional independence with ADLs by performing:    UE Dressing with Modified [place].  LE Dressing with Contact Guard Assistance.  Grooming while seated at sink with Modified [place].  Toileting from bedside commode with Contact Guard Assistance for hygiene and clothing management.   Bathing from  shower chair/bench with Contact Guard Assistance.  Toilet transfer to bedside commode with Contact Guard Assistance.                         Recommendations:     Discharge Equipment Recommendations:  wheelchair     Plan:     Patient to be seen 6 x/week (QD/BID) to address the above listed problems via self-care/home management, therapeutic activities, therapeutic exercises, wheelchair management/training  Plan of Care Expires: 05/28/24  Plan of Care Reviewed with: patient  Revisions made to plan of care: No      Skilled OT Minutes Provided: 24  Communication with Treatment Team:     Equipment recommendations:       At end of treatment, patient remained:   Up in chair     Up in wheelchair in room   x In bed   x With alarm activated   x Bed rails up   x Call bell in reach     Family/friends present    Restraints secured properly    In bathroom with CNA/RN notified    In gym with PT/PTA/tech    Nurse aware    Other:

## 2024-05-20 NOTE — PLAN OF CARE
Problem: Adult Inpatient Plan of Care  Goal: Plan of Care Review  Outcome: Progressing  Flowsheets (Taken 5/20/2024 0936)  Plan of Care Reviewed With: patient  Goal: Patient-Specific Goal (Individualized)  Outcome: Progressing  Flowsheets (Taken 5/20/2024 0936)  Individualized Care Needs: safety with mobility, monitor for s/s of infection, monitor blood glucose levels  Anxieties, Fears or Concerns: none expressed at this time  Goal: Absence of Hospital-Acquired Illness or Injury  Outcome: Progressing  Intervention: Identify and Manage Fall Risk  Flowsheets (Taken 5/20/2024 0936)  Safety Promotion/Fall Prevention:   assistive device/personal item within reach   bed alarm set   nonskid shoes/socks when out of bed   side rails raised x 3  Intervention: Prevent Skin Injury  Flowsheets (Taken 5/20/2024 0936)  Body Position: position changed independently  Skin Protection: incontinence pads utilized  Device Skin Pressure Protection:   absorbent pad utilized/changed   adhesive use limited  Intervention: Prevent and Manage VTE (Venous Thromboembolism) Risk  Flowsheets (Taken 5/20/2024 0936)  VTE Prevention/Management:   ambulation promoted   bleeding precations maintained   bleeding risk assessed   fluids promoted  Intervention: Prevent Infection  Flowsheets (Taken 5/20/2024 0936)  Infection Prevention:   cohorting utilized   environmental surveillance performed   equipment surfaces disinfected   hand hygiene promoted   single patient room provided   rest/sleep promoted  Goal: Optimal Comfort and Wellbeing  Outcome: Progressing  Intervention: Monitor Pain and Promote Comfort  Flowsheets (Taken 5/20/2024 0936)  Pain Management Interventions:   care clustered   pillow support provided   position adjusted   relaxation techniques promoted   quiet environment facilitated  Intervention: Provide Person-Centered Care  Flowsheets (Taken 5/20/2024 0936)  Trust Relationship/Rapport:   care explained   questions encouraged   choices  provided   reassurance provided   emotional support provided   thoughts/feelings acknowledged   empathic listening provided   questions answered  Goal: Readiness for Transition of Care  Outcome: Progressing  Intervention: Mutually Develop Transition Plan  Flowsheets (Taken 5/20/2024 0936)  Communicated CIELO with patient/caregiver: Date not available/Unable to determine     Problem: Diabetes Comorbidity  Goal: Blood Glucose Level Within Targeted Range  Outcome: Progressing  Intervention: Monitor and Manage Glycemia  Flowsheets (Taken 5/20/2024 0936)  Glycemic Management: blood glucose monitored     Problem: Infection  Goal: Absence of Infection Signs and Symptoms  Outcome: Progressing  Intervention: Prevent or Manage Infection  Flowsheets (Taken 5/20/2024 0936)  Fever Reduction/Comfort Measures:   lightweight bedding   lightweight clothing  Infection Management: aseptic technique maintained  Isolation Precautions:   precautions maintained   protective     Problem: Skin Injury Risk Increased  Goal: Skin Health and Integrity  Outcome: Progressing  Intervention: Optimize Skin Protection  Flowsheets (Taken 5/20/2024 0936)  Pressure Reduction Techniques:   frequent weight shift encouraged   weight shift assistance provided   pressure points protected  Pressure Reduction Devices: positioning supports utilized  Skin Protection: incontinence pads utilized  Activity Management: Rolling - L1  Head of Bed (HOB) Positioning: HOB at 30-45 degrees  Intervention: Promote and Optimize Oral Intake  Flowsheets (Taken 5/20/2024 0936)  Oral Nutrition Promotion:   adaptive equipment use encouraged   calorie-dense foods provided   calorie-dense liquids provided     Problem: Fall Injury Risk  Goal: Absence of Fall and Fall-Related Injury  Outcome: Progressing  Intervention: Identify and Manage Contributors  Flowsheets (Taken 5/20/2024 0936)  Self-Care Promotion:   independence encouraged   BADL personal objects within reach   BADL personal  routines maintained   meal set-up provided   adaptive equipment use encouraged  Medication Review/Management:   medications reviewed   high-risk medications identified  Intervention: Promote Injury-Free Environment  Flowsheets (Taken 5/20/2024 0936)  Safety Promotion/Fall Prevention:   assistive device/personal item within reach   bed alarm set   nonskid shoes/socks when out of bed   side rails raised x 3     Problem: Mobility Impairment  Goal: Optimal Mobility  Outcome: Progressing  Intervention: Optimize Mobility  Flowsheets (Taken 5/20/2024 0936)  Assistive Device Utilized:   gait belt   walker  Activity Management: Rolling - L1  Positioning/Transfer Devices: pillows     Problem: Wound  Goal: Optimal Coping  Outcome: Progressing  Intervention: Support Patient and Family Response  Flowsheets (Taken 5/20/2024 0936)  Supportive Measures:   active listening utilized   relaxation techniques promoted   positive reinforcement provided   self-reflection promoted   self-care encouraged  Family/Support System Care: self-care encouraged  Goal: Optimal Functional Ability  Outcome: Progressing  Intervention: Optimize Functional Ability  Flowsheets (Taken 5/20/2024 0936)  Assistive Device Utilized:   gait belt   walker  Activity Management: Rolling - L1  Activity Assistance Provided: assistance, 2 people  Goal: Absence of Infection Signs and Symptoms  Outcome: Progressing  Intervention: Prevent or Manage Infection  Flowsheets (Taken 5/20/2024 0936)  Fever Reduction/Comfort Measures:   lightweight bedding   lightweight clothing  Infection Management: aseptic technique maintained  Isolation Precautions:   precautions maintained   protective  Goal: Improved Oral Intake  Outcome: Progressing  Intervention: Promote and Optimize Oral Intake  Flowsheets (Taken 5/20/2024 0936)  Oral Nutrition Promotion:   adaptive equipment use encouraged   calorie-dense foods provided   calorie-dense liquids provided  Goal: Optimal Pain Control and  Function  Outcome: Progressing  Intervention: Prevent or Manage Pain  Flowsheets (Taken 5/20/2024 0936)  Sleep/Rest Enhancement:   awakenings minimized   consistent schedule promoted   relaxation techniques promoted   regular sleep/rest pattern promoted  Pain Management Interventions:   care clustered   pillow support provided   position adjusted   relaxation techniques promoted   quiet environment facilitated  Goal: Skin Health and Integrity  Outcome: Progressing  Intervention: Optimize Skin Protection  Flowsheets (Taken 5/20/2024 0936)  Pressure Reduction Techniques:   frequent weight shift encouraged   weight shift assistance provided   pressure points protected  Pressure Reduction Devices: positioning supports utilized  Skin Protection: incontinence pads utilized  Activity Management: Rolling - L1  Head of Bed (HOB) Positioning: HOB at 30-45 degrees  Goal: Optimal Wound Healing  Outcome: Progressing  Intervention: Promote Wound Healing  Flowsheets (Taken 5/20/2024 0936)  Sleep/Rest Enhancement:   awakenings minimized   consistent schedule promoted   relaxation techniques promoted   regular sleep/rest pattern promoted

## 2024-05-20 NOTE — PROGRESS NOTES
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 Daughter reports patient was unable to follow commands and falling asleep easily. Upon arrival to the ER, vitals revealed elevated temperature reading and mildly elevated BP. Labs were significant for leukocytosis, hypoglycemia with a glucose of 28, elevated CRP. Patient was given a D10 infusion with improvement of glucose to 126. CXR revealed prominent interstitial markings with no focal opacification, no acute cardiopulmonary process appreciated. CT head revealed no acute intracranial abnormality identified with chronic microvascular ischemic disease. Daughter at bedside brought patient's home medication bottles. Patient had 4 bottles of Metformin 1000mg BID which daughter reports patient only takes once daily and 3 bottles of Glimepiride 2mg BID. Daughter reports she gets help from a nurse who comes to her home to organize the patient's medication therefore she does not believe he could have taken too much medication. On exam, CBG registered as <20. He did receive a one time dose of Octreotide 50mcg and D5NS at 100cc/hr. Patient is being admitted to hospital medicine service.     Today:  No reported complaints today. Increasing Metformin to 1000mg BID due to hyperglycemia.     OBJECTIVE/PHYSICAL EXAM     VITAL SIGNS (MOST RECENT):  Temp: 97.5 °F (36.4 °C) (05/20/24 0707)  Pulse: 85 (05/20/24 0707)  Resp: 18 (05/19/24 1932)  BP: (!) 164/81 (05/20/24 0859)  SpO2: 95 % (05/20/24 0707) VITAL SIGNS (24 HOUR RANGE):  Temp:  [97.5 °F (36.4 °C)-98.6 °F (37 °C)] 97.5 °F (36.4 °C)  Pulse:  [85-92] 85  Resp:  [18] 18  SpO2:  [95 %-99 %] 95  %  BP: (163-164)/(75-81) 164/81     GENERAL: In no acute distress, afebrile  HEENT: Atraumatic, normocephalic, moist mucous membranes  CHEST: Clear to auscultation bilaterally  HEART: S1, S2, no appreciable murmur  ABDOMEN: Soft, nontender, BS +  MSK: Warm, no lower extremity edema, no clubbing or cyanosis, left BKA  NEUROLOGIC: Alert and oriented x4, moving all extremities with good strength   INTEGUMENTARY: See media  PSYCHIATRY: Appropriate mood and affect     ASSESSMENT/PLAN     Sulfonylurea induced hypoglycemia   Uncontrolled DM2   Hold home Glimepiride   Received D10 in ER   Received Octreotide 50mcg  AC[facility] CBGs   Hypoglycemic protocol  A1c 6%   Increase Metformin to 1000mg BID      Acute metabolic encephalopathy - resolved   2/2 hypoglycemia and receiving sedating medications in the ER   See above treatment  Hold sedating medications   CT head revealed no acute intracranial abnormality identified with chronic microvascular ischemic disease     Leukocytosis - resolved   UA without UTI   CXR without acute findings     Covid, flu, RSV negative      HTN  Increase Lisinopril to 20mg on 05/20/2024     BPH  Continue home Flomax     Iron deficiency   Continue home iron supplementation     Bipolar disorder, insomnia, dementia  Continue home Mirtazapine when patient appropriate   Daughter requesting NH placement at [facility] -  consulted      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   PT/OT - needs appointment with [facility]     DVT prophylaxis:  Lovenox     Anticipated discharge and disposition: Continue to monitor glucose, continue PT/OT, family requesting NH placement   __________________________________________________________________________    LABS/MICRO/MEDS/DIAGNOSTICS     LABS  Recent Labs     05/20/24  0432   *   K 5.0   CHLORIDE 99   CO2 27   BUN 18.4   CREATININE 1.11   GLUCOSE 170*   CALCIUM 9.1     Recent Labs     05/20/24  0432   WBC 9.26   RBC 4.29*   HCT 36.2*   MCV 84.4     "    MICROBIOLOGY  Microbiology Results (last 7 days)       Procedure Component Value Units Date/Time    Blood Culture [[id number]]  (Normal) Collected: 05/13/24 1436    Order Status: Completed Specimen: Blood Updated: 05/18/24 1800     Blood Culture No Growth at 5 days    Blood Culture [[id number]]  (Normal) Collected: 05/13/24 1436    Order Status: Completed Specimen: Blood Updated: 05/18/24 1800     Blood Culture No Growth at 5 days             MEDICATIONS   aspirin  81 mg Oral Daily    citalopram  10 mg Oral Daily    enoxparin  40 mg Subcutaneous Daily    ferrous sulfate  1 tablet Oral Daily    lisinopriL  10 mg Oral Daily    metFORMIN  1,000 mg Oral BID    mirtazapine  15 mg Oral QHS    tamsulosin  0.4 mg Oral Daily         INFUSIONS       DIAGNOSTIC TESTS  CT Head Without Contrast   Final Result      No acute intracranial abnormality identified.  Findings of chronic microvascular ischemic disease.         Electronically signed by: Jeffrey Gamez   Date:    05/13/2024   Time:    13:35      X-Ray Chest 1 View   Final Result      Prominent interstitial markings with no focal opacification.  No acute cardiopulmonary process appreciated.         Electronically signed by: Jeffrey Gamez   Date:    05/13/2024   Time:    11:11         No results found for: "EF"     NUTRITION STATUS  Patient meets ASPEN criteria for   malnutrition of other (see comments) (doesn't meet criteria) per RD assessment as evidenced by:                       A minimum of two characteristics is recommended for diagnosis of either severe or non-severe malnutrition.     Case related differential diagnoses have been reviewed; assessment and plan has been documented. I have personally reviewed the labs and test results that are currently available; I have reviewed the patients medication list. I have reviewed the consulting providers recommendations. I have reviewed or attempted to review medical records based upon their availability.  All of the " patient's and/or family's questions have been addressed and answered to the best of my ability.  I will continue to monitor closely and make adjustments to medical management as needed.  This document was created using M*Modal Fluency Direct.  Transcription errors may have been made.  Please contact me if any questions may rise regarding documentation to clarify transcription.      ALICE Hilton   Internal Medicine  Department of Hospital Medicine [facility]

## 2024-05-20 NOTE — PT/OT/SLP PROGRESS
Occupational Therapy  Treatment    Name: Josep Angulo    : 1950 (73 y.o.)  MRN: [id number]           TREATMENT SUMMARY AND RECOMMENDATIONS:      OT Date of Treatment: 24  OT Start Time: 1000  OT Stop Time: 1026  OT Total Time (min): 26 min      Subjective Assessment:  x No complaints  Lethargic   x Awake, alert, cooperative  Impulsive    Uncooperative   Flat affect    Agitated  c/o pain    Appropriate  c/o fatigue    Confused x Treated at bedside     Emotionally labile  Treated in gym/dept.      Other:        Therapy Precautions:    Cognitive deficits  Spinal precautions    Collar - hard  Sternal precautions    Collar - soft   TLSO   x Fall risk  LSO    Hip precautions - posterior  Knee immobilizer    Hip precautions - anterior  WBAT    Impaired communication  Partial weightbearing    Oxygen  TTWB    PEG tube  NWB    Visual deficits      Hearing deficits   Other:        Treatment Objectives:     Mobility Training:    Mobility task Assist level Comments    Bed mobility     Transfer CGA Modified squat/pivot t/f w/c>BSChair   Sit to stands transitions     Functional mobility     Sitting balance     Standing balance      Other:        ADL Training:    ADL Assist level Comments    Feeding     Grooming/hygiene SBA Pt combed hair while seated in w/c at sink    Bathing Min A OT assisted to bath posterior britney area in standing d/t fall risk and safety concerns in standing.    Upper body dressing SBA Pt able to priya hospital gown   Lower body dressing CGA Pt able to priya/doff R sock; able to thread briefs and manage over hips in standing with CGA for balance/safety    Toileting CGA (+) void (+) BM; pt able to complete all parts with increased time and CGA for balance/safety in standing   Toilet transfer CGA Modified squat/pivot t/f w/c<>toilet   Adaptive equipment training     Other: shower transfer CGA Modified squat/pivot t/f w/c<>shower chair with GB use       Assessment: Patient tolerated session well. Pt  had positive response to today's treatment. Pt continues to make good progress towards goals. Pt would continue to benefit from skilled OT services to improve pt's safety and independence with daily occupations, decrease caregiver burden, and reduce pt's risk of falls.     GOALS:   Multidisciplinary Problems       Occupational Therapy Goals          Problem: Occupational Therapy    Goal Priority Disciplines Outcome Interventions   Occupational Therapy Goal     OT, PT/OT Progressing    Description: Goals to be met by: 5/28/24     Patient will increase functional independence with ADLs by performing:    UE Dressing with Modified [place].  LE Dressing with Contact Guard Assistance.  Grooming while seated at sink with Modified [place].  Toileting from bedside commode with Contact Guard Assistance for hygiene and clothing management.   Bathing from  shower chair/bench with Contact Guard Assistance.  Toilet transfer to bedside commode with Contact Guard Assistance.                         Recommendations:     Discharge Equipment Recommendations:  wheelchair     Plan:     Patient to be seen 6 x/week (QD/BID) to address the above listed problems via self-care/home management, therapeutic activities, therapeutic exercises, wheelchair management/training  Plan of Care Expires: 05/28/24  Plan of Care Reviewed with: patient  Revisions made to plan of care: No      Skilled OT Minutes Provided: 26  Communication with Treatment Team:     Equipment recommendations:       At end of treatment, patient remained:  x Up in chair     Up in wheelchair in room    In bed   x With alarm activated    Bed rails up   x Call bell in reach     Family/friends present    Restraints secured properly    In bathroom with CNA/RN notified    In gym with PT/PTA/tech    Nurse aware    Other:

## 2024-05-20 NOTE — PLAN OF CARE
Called and left message for Giana Copeland at [facility]. Updated clinicals sent to [facility] via [facility]

## 2024-05-21 ENCOUNTER — HOSPITAL ENCOUNTER (INPATIENT)
Facility: HOSPITAL | Age: 74
LOS: 17 days | Discharge: HOME HEALTH | DRG: 638 | End: 2024-06-07
Attending: INTERNAL MEDICINE | Admitting: INTERNAL MEDICINE
Payer: MEDICARE

## 2024-05-21 VITALS
HEART RATE: 86 BPM | DIASTOLIC BLOOD PRESSURE: 80 MMHG | RESPIRATION RATE: 18 BRPM | WEIGHT: 165.81 LBS | HEIGHT: 68 IN | BODY MASS INDEX: 25.13 KG/M2 | TEMPERATURE: 98 F | OXYGEN SATURATION: 95 % | SYSTOLIC BLOOD PRESSURE: 142 MMHG

## 2024-05-21 DIAGNOSIS — R53.1 WEAKNESS GENERALIZED: ICD-10-CM

## 2024-05-21 DIAGNOSIS — E11.649 HYPOGLYCEMIA ASSOCIATED WITH DIABETES: ICD-10-CM

## 2024-05-21 DIAGNOSIS — R07.9 CHEST PAIN: ICD-10-CM

## 2024-05-21 DIAGNOSIS — S88.112D BELOW-KNEE AMPUTATION OF LEFT LOWER EXTREMITY, SUBSEQUENT ENCOUNTER: Primary | ICD-10-CM

## 2024-05-21 LAB
GLUCOSE SERPL-MCNC: 172 MG/DL (ref 70–110)
POCT GLUCOSE: 162 MG/DL (ref 70–110)
POCT GLUCOSE: 172 MG/DL (ref 70–110)
POCT GLUCOSE: 197 MG/DL (ref 70–110)
POCT GLUCOSE: 306 MG/DL (ref 70–110)

## 2024-05-21 PROCEDURE — 97165 OT EVAL LOW COMPLEX 30 MIN: CPT

## 2024-05-21 PROCEDURE — 97530 THERAPEUTIC ACTIVITIES: CPT | Mod: CQ

## 2024-05-21 PROCEDURE — 97535 SELF CARE MNGMENT TRAINING: CPT

## 2024-05-21 PROCEDURE — 99900035 HC TECH TIME PER 15 MIN (STAT)

## 2024-05-21 PROCEDURE — 25000003 PHARM REV CODE 250: Performed by: PHYSICIAN ASSISTANT

## 2024-05-21 PROCEDURE — 97110 THERAPEUTIC EXERCISES: CPT

## 2024-05-21 PROCEDURE — 94760 N-INVAS EAR/PLS OXIMETRY 1: CPT

## 2024-05-21 PROCEDURE — 25000003 PHARM REV CODE 250

## 2024-05-21 PROCEDURE — 63600175 PHARM REV CODE 636 W HCPCS: Performed by: PHYSICIAN ASSISTANT

## 2024-05-21 PROCEDURE — 97530 THERAPEUTIC ACTIVITIES: CPT

## 2024-05-21 PROCEDURE — 97161 PT EVAL LOW COMPLEX 20 MIN: CPT

## 2024-05-21 PROCEDURE — 97110 THERAPEUTIC EXERCISES: CPT | Mod: CQ

## 2024-05-21 PROCEDURE — 11000004 HC SNF PRIVATE

## 2024-05-21 RX ORDER — HYDRALAZINE HYDROCHLORIDE 20 MG/ML
5 INJECTION INTRAMUSCULAR; INTRAVENOUS EVERY 4 HOURS PRN
Status: DISCONTINUED | OUTPATIENT
Start: 2024-05-21 | End: 2024-06-07 | Stop reason: HOSPADM

## 2024-05-21 RX ORDER — SODIUM CHLORIDE 0.9 % (FLUSH) 0.9 %
10 SYRINGE (ML) INJECTION
Status: DISCONTINUED | OUTPATIENT
Start: 2024-05-21 | End: 2024-06-07 | Stop reason: HOSPADM

## 2024-05-21 RX ORDER — ONDANSETRON HYDROCHLORIDE 2 MG/ML
4 INJECTION, SOLUTION INTRAVENOUS EVERY 8 HOURS PRN
Status: CANCELLED | OUTPATIENT
Start: 2024-05-21

## 2024-05-21 RX ORDER — MIRTAZAPINE 7.5 MG/1
15 TABLET, FILM COATED ORAL NIGHTLY
Status: DISCONTINUED | OUTPATIENT
Start: 2024-05-21 | End: 2024-06-07 | Stop reason: HOSPADM

## 2024-05-21 RX ORDER — ALUMINUM HYDROXIDE, MAGNESIUM HYDROXIDE, AND SIMETHICONE 1200; 120; 1200 MG/30ML; MG/30ML; MG/30ML
30 SUSPENSION ORAL 4 TIMES DAILY PRN
Status: DISCONTINUED | OUTPATIENT
Start: 2024-05-21 | End: 2024-06-07 | Stop reason: HOSPADM

## 2024-05-21 RX ORDER — ONDANSETRON 4 MG/1
8 TABLET, ORALLY DISINTEGRATING ORAL EVERY 8 HOURS PRN
Status: DISCONTINUED | OUTPATIENT
Start: 2024-05-21 | End: 2024-06-07 | Stop reason: HOSPADM

## 2024-05-21 RX ORDER — MIRTAZAPINE 7.5 MG/1
15 TABLET, FILM COATED ORAL NIGHTLY
Status: CANCELLED | OUTPATIENT
Start: 2024-05-21

## 2024-05-21 RX ORDER — POLYETHYLENE GLYCOL 3350 17 G/17G
17 POWDER, FOR SOLUTION ORAL 3 TIMES DAILY PRN
Status: DISCONTINUED | OUTPATIENT
Start: 2024-05-21 | End: 2024-06-07 | Stop reason: HOSPADM

## 2024-05-21 RX ORDER — LANOLIN ALCOHOL/MO/W.PET/CERES
1 CREAM (GRAM) TOPICAL DAILY
Status: DISCONTINUED | OUTPATIENT
Start: 2024-05-22 | End: 2024-06-07 | Stop reason: HOSPADM

## 2024-05-21 RX ORDER — CITALOPRAM 10 MG/1
10 TABLET ORAL DAILY
Status: DISCONTINUED | OUTPATIENT
Start: 2024-05-22 | End: 2024-06-07 | Stop reason: HOSPADM

## 2024-05-21 RX ORDER — INSULIN ASPART 100 [IU]/ML
0-5 INJECTION, SOLUTION INTRAVENOUS; SUBCUTANEOUS
Status: CANCELLED | OUTPATIENT
Start: 2024-05-21

## 2024-05-21 RX ORDER — ONDANSETRON HYDROCHLORIDE 2 MG/ML
4 INJECTION, SOLUTION INTRAVENOUS EVERY 8 HOURS PRN
Status: DISCONTINUED | OUTPATIENT
Start: 2024-05-21 | End: 2024-06-07 | Stop reason: HOSPADM

## 2024-05-21 RX ORDER — INSULIN ASPART 100 [IU]/ML
0-5 INJECTION, SOLUTION INTRAVENOUS; SUBCUTANEOUS
Status: DISCONTINUED | OUTPATIENT
Start: 2024-05-21 | End: 2024-05-22

## 2024-05-21 RX ORDER — POLYETHYLENE GLYCOL 3350 17 G/17G
17 POWDER, FOR SOLUTION ORAL 3 TIMES DAILY PRN
Status: CANCELLED | OUTPATIENT
Start: 2024-05-21

## 2024-05-21 RX ORDER — IBUPROFEN 200 MG
16 TABLET ORAL
Status: DISCONTINUED | OUTPATIENT
Start: 2024-05-21 | End: 2024-06-07 | Stop reason: HOSPADM

## 2024-05-21 RX ORDER — SIMETHICONE 80 MG
1 TABLET,CHEWABLE ORAL 4 TIMES DAILY PRN
Status: DISCONTINUED | OUTPATIENT
Start: 2024-05-21 | End: 2024-06-07 | Stop reason: HOSPADM

## 2024-05-21 RX ORDER — MORPHINE SULFATE 4 MG/ML
2 INJECTION, SOLUTION INTRAMUSCULAR; INTRAVENOUS EVERY 6 HOURS PRN
Status: CANCELLED | OUTPATIENT
Start: 2024-05-21

## 2024-05-21 RX ORDER — NALOXONE HCL 0.4 MG/ML
0.02 VIAL (ML) INJECTION
Status: CANCELLED | OUTPATIENT
Start: 2024-05-21

## 2024-05-21 RX ORDER — HYDRALAZINE HYDROCHLORIDE 20 MG/ML
5 INJECTION INTRAMUSCULAR; INTRAVENOUS EVERY 4 HOURS PRN
Status: CANCELLED | OUTPATIENT
Start: 2024-05-21

## 2024-05-21 RX ORDER — SIMETHICONE 80 MG
1 TABLET,CHEWABLE ORAL 4 TIMES DAILY PRN
Status: CANCELLED | OUTPATIENT
Start: 2024-05-21

## 2024-05-21 RX ORDER — ASPIRIN 81 MG/1
81 TABLET ORAL DAILY
Status: CANCELLED | OUTPATIENT
Start: 2024-05-22

## 2024-05-21 RX ORDER — IPRATROPIUM BROMIDE AND ALBUTEROL SULFATE 2.5; .5 MG/3ML; MG/3ML
3 SOLUTION RESPIRATORY (INHALATION) EVERY 6 HOURS PRN
Status: CANCELLED | OUTPATIENT
Start: 2024-05-21

## 2024-05-21 RX ORDER — LISINOPRIL 20 MG/1
20 TABLET ORAL DAILY
Status: CANCELLED | OUTPATIENT
Start: 2024-05-22

## 2024-05-21 RX ORDER — OCTREOTIDE ACETATE 50 UG/ML
50 INJECTION, SOLUTION INTRAVENOUS; SUBCUTANEOUS EVERY 6 HOURS PRN
Status: CANCELLED | OUTPATIENT
Start: 2024-05-21

## 2024-05-21 RX ORDER — GLUCAGON 1 MG
1 KIT INJECTION
Status: CANCELLED | OUTPATIENT
Start: 2024-05-21

## 2024-05-21 RX ORDER — BISACODYL 10 MG/1
10 SUPPOSITORY RECTAL DAILY PRN
Status: CANCELLED | OUTPATIENT
Start: 2024-05-21

## 2024-05-21 RX ORDER — OCTREOTIDE ACETATE 50 UG/ML
50 INJECTION, SOLUTION INTRAVENOUS; SUBCUTANEOUS EVERY 6 HOURS PRN
Status: DISCONTINUED | OUTPATIENT
Start: 2024-05-21 | End: 2024-06-07 | Stop reason: HOSPADM

## 2024-05-21 RX ORDER — LANOLIN ALCOHOL/MO/W.PET/CERES
1 CREAM (GRAM) TOPICAL DAILY
Status: CANCELLED | OUTPATIENT
Start: 2024-05-22

## 2024-05-21 RX ORDER — IBUPROFEN 200 MG
16 TABLET ORAL
Status: CANCELLED | OUTPATIENT
Start: 2024-05-21

## 2024-05-21 RX ORDER — ALUMINUM HYDROXIDE, MAGNESIUM HYDROXIDE, AND SIMETHICONE 1200; 120; 1200 MG/30ML; MG/30ML; MG/30ML
30 SUSPENSION ORAL 4 TIMES DAILY PRN
Status: CANCELLED | OUTPATIENT
Start: 2024-05-21

## 2024-05-21 RX ORDER — IBUPROFEN 200 MG
24 TABLET ORAL
Status: DISCONTINUED | OUTPATIENT
Start: 2024-05-21 | End: 2024-06-07 | Stop reason: HOSPADM

## 2024-05-21 RX ORDER — TALC
9 POWDER (GRAM) TOPICAL NIGHTLY PRN
Status: DISCONTINUED | OUTPATIENT
Start: 2024-05-21 | End: 2024-06-07 | Stop reason: HOSPADM

## 2024-05-21 RX ORDER — GUAIFENESIN 100 MG/5ML
200 SOLUTION ORAL EVERY 4 HOURS PRN
Status: CANCELLED | OUTPATIENT
Start: 2024-05-21

## 2024-05-21 RX ORDER — HYDROCODONE BITARTRATE AND ACETAMINOPHEN 5; 325 MG/1; MG/1
1 TABLET ORAL EVERY 6 HOURS PRN
Status: DISCONTINUED | OUTPATIENT
Start: 2024-05-21 | End: 2024-06-07 | Stop reason: HOSPADM

## 2024-05-21 RX ORDER — IPRATROPIUM BROMIDE AND ALBUTEROL SULFATE 2.5; .5 MG/3ML; MG/3ML
3 SOLUTION RESPIRATORY (INHALATION) EVERY 6 HOURS PRN
Status: DISCONTINUED | OUTPATIENT
Start: 2024-05-21 | End: 2024-06-07 | Stop reason: HOSPADM

## 2024-05-21 RX ORDER — BISACODYL 10 MG/1
10 SUPPOSITORY RECTAL DAILY PRN
Status: DISCONTINUED | OUTPATIENT
Start: 2024-05-21 | End: 2024-06-07 | Stop reason: HOSPADM

## 2024-05-21 RX ORDER — TAMSULOSIN HYDROCHLORIDE 0.4 MG/1
0.4 CAPSULE ORAL DAILY
Status: CANCELLED | OUTPATIENT
Start: 2024-05-22

## 2024-05-21 RX ORDER — CITALOPRAM 10 MG/1
10 TABLET ORAL DAILY
Status: CANCELLED | OUTPATIENT
Start: 2024-05-22

## 2024-05-21 RX ORDER — ENOXAPARIN SODIUM 100 MG/ML
40 INJECTION SUBCUTANEOUS EVERY 24 HOURS
Status: DISCONTINUED | OUTPATIENT
Start: 2024-05-21 | End: 2024-06-07 | Stop reason: HOSPADM

## 2024-05-21 RX ORDER — ASPIRIN 81 MG/1
81 TABLET ORAL DAILY
Status: DISCONTINUED | OUTPATIENT
Start: 2024-05-22 | End: 2024-06-07 | Stop reason: HOSPADM

## 2024-05-21 RX ORDER — SODIUM CHLORIDE 0.9 % (FLUSH) 0.9 %
10 SYRINGE (ML) INJECTION
Status: CANCELLED | OUTPATIENT
Start: 2024-05-21

## 2024-05-21 RX ORDER — METFORMIN HYDROCHLORIDE 500 MG/1
1000 TABLET ORAL 2 TIMES DAILY
Status: DISCONTINUED | OUTPATIENT
Start: 2024-05-21 | End: 2024-06-07 | Stop reason: HOSPADM

## 2024-05-21 RX ORDER — GUAIFENESIN 100 MG/5ML
200 SOLUTION ORAL EVERY 4 HOURS PRN
Status: DISCONTINUED | OUTPATIENT
Start: 2024-05-21 | End: 2024-06-07 | Stop reason: HOSPADM

## 2024-05-21 RX ORDER — HYDROCODONE BITARTRATE AND ACETAMINOPHEN 5; 325 MG/1; MG/1
1 TABLET ORAL EVERY 6 HOURS PRN
Status: CANCELLED | OUTPATIENT
Start: 2024-05-21

## 2024-05-21 RX ORDER — ONDANSETRON 4 MG/1
8 TABLET, ORALLY DISINTEGRATING ORAL EVERY 8 HOURS PRN
Status: CANCELLED | OUTPATIENT
Start: 2024-05-21

## 2024-05-21 RX ORDER — IBUPROFEN 200 MG
24 TABLET ORAL
Status: CANCELLED | OUTPATIENT
Start: 2024-05-21

## 2024-05-21 RX ORDER — ACETAMINOPHEN 325 MG/1
650 TABLET ORAL EVERY 4 HOURS PRN
Status: CANCELLED | OUTPATIENT
Start: 2024-05-21

## 2024-05-21 RX ORDER — ACETAMINOPHEN 325 MG/1
650 TABLET ORAL EVERY 4 HOURS PRN
Status: DISCONTINUED | OUTPATIENT
Start: 2024-05-21 | End: 2024-06-07 | Stop reason: HOSPADM

## 2024-05-21 RX ORDER — TAMSULOSIN HYDROCHLORIDE 0.4 MG/1
0.4 CAPSULE ORAL DAILY
Status: DISCONTINUED | OUTPATIENT
Start: 2024-05-22 | End: 2024-06-07 | Stop reason: HOSPADM

## 2024-05-21 RX ORDER — METFORMIN HYDROCHLORIDE 500 MG/1
1000 TABLET ORAL 2 TIMES DAILY
Status: CANCELLED | OUTPATIENT
Start: 2024-05-21

## 2024-05-21 RX ORDER — ENOXAPARIN SODIUM 100 MG/ML
40 INJECTION SUBCUTANEOUS EVERY 24 HOURS
Status: CANCELLED | OUTPATIENT
Start: 2024-05-21

## 2024-05-21 RX ORDER — TALC
9 POWDER (GRAM) TOPICAL NIGHTLY PRN
Status: CANCELLED | OUTPATIENT
Start: 2024-05-21

## 2024-05-21 RX ORDER — LISINOPRIL 20 MG/1
20 TABLET ORAL DAILY
Status: DISCONTINUED | OUTPATIENT
Start: 2024-05-22 | End: 2024-06-04

## 2024-05-21 RX ORDER — NALOXONE HCL 0.4 MG/ML
0.02 VIAL (ML) INJECTION
Status: DISCONTINUED | OUTPATIENT
Start: 2024-05-21 | End: 2024-06-07 | Stop reason: HOSPADM

## 2024-05-21 RX ORDER — GLUCAGON 1 MG
1 KIT INJECTION
Status: DISCONTINUED | OUTPATIENT
Start: 2024-05-21 | End: 2024-06-07 | Stop reason: HOSPADM

## 2024-05-21 RX ORDER — MORPHINE SULFATE 4 MG/ML
2 INJECTION, SOLUTION INTRAMUSCULAR; INTRAVENOUS EVERY 6 HOURS PRN
Status: DISCONTINUED | OUTPATIENT
Start: 2024-05-21 | End: 2024-06-07 | Stop reason: HOSPADM

## 2024-05-21 RX ADMIN — METFORMIN HYDROCHLORIDE 1000 MG: 500 TABLET ORAL at 08:05

## 2024-05-21 RX ADMIN — INSULIN ASPART 2 UNITS: 100 INJECTION, SOLUTION INTRAVENOUS; SUBCUTANEOUS at 08:05

## 2024-05-21 RX ADMIN — TAMSULOSIN HYDROCHLORIDE 0.4 MG: 0.4 CAPSULE ORAL at 09:05

## 2024-05-21 RX ADMIN — ASPIRIN 81 MG: 81 TABLET, COATED ORAL at 09:05

## 2024-05-21 RX ADMIN — ENOXAPARIN SODIUM 40 MG: 40 INJECTION SUBCUTANEOUS at 04:05

## 2024-05-21 RX ADMIN — FERROUS SULFATE TAB 325 MG (65 MG ELEMENTAL FE) 1 EACH: 325 (65 FE) TAB at 09:05

## 2024-05-21 RX ADMIN — LISINOPRIL 20 MG: 20 TABLET ORAL at 09:05

## 2024-05-21 RX ADMIN — METFORMIN HYDROCHLORIDE 1000 MG: 500 TABLET ORAL at 09:05

## 2024-05-21 RX ADMIN — CITALOPRAM HYDROBROMIDE 10 MG: 10 TABLET ORAL at 09:05

## 2024-05-21 RX ADMIN — MIRTAZAPINE 15 MG: 7.5 TABLET, FILM COATED ORAL at 08:05

## 2024-05-21 NOTE — PT/OT/SLP EVAL
Occupational Therapy   Evaluation    Name: Josep Angulo  MRN: [id number]  Admitting Diagnosis: Hypoglycemia associated with diabetes  Recent Surgery: * No surgery found *      Recommendations:     Discharge Recommendations: Low Intensity Therapy  Discharge Equipment Recommendations:  none  Barriers to discharge:  Decreased caregiver support    Assessment:     Josep Angulo is a 73 y.o. male with a medical diagnosis of Hypoglycemia associated with diabetes.  He presents with decreased strength and endurance affecting his ADL performance. Pt reports visual deficits/blurriness in L eye. States he typically ambulates with prosthetic on LLE however has lost sock and needs readjustment of prosthetic with . Daughter reports wanting NH placement [place]. Performance deficits affecting function: weakness, impaired self care skills, impaired balance, impaired endurance, impaired functional mobility, decreased safety awareness.      Rehab Prognosis: Good; patient would benefit from acute skilled OT services to address these deficits and reach maximum level of function.       Plan:     Patient to be seen 6 x/week to address the above listed problems via self-care/home management, therapeutic activities, therapeutic exercises, wheelchair management/training  Plan of Care Expires: 06/04/24  Plan of Care Reviewed with: patient    Subjective     Chief Complaint: sore throat   Patient/Family Comments/goals: Pt wanting to be able to return home; daughter wants NH placement     Occupational Profile:  Living Environment: lives with daughter (who works) and ex-wife in  home with 3 steps to enter (+) 1 hand rail   Previous level of function: Pt reports mod (I) with gait and ADLs using LLE prosthetic    Equipment Used at Home: walker, rolling, prosthesis, bedside commode  Assistance upon Discharge: family     Pain/Comfort:  Pain Rating 1: 0/10    Patients cultural, spiritual, Yazidism conflicts given the current situation:  no    Objective:     Communicated with: RN prior to session.  Patient found supine with   upon OT entry to room.    General Precautions: Standard, fall, vision impaired  Orthopedic Precautions: N/A  Braces: N/A  Respiratory Status: room air     Occupational Performance:    Bed Mobility:    Patient completed Supine to Sit with contact guard assistance    Functional Mobility/Transfers:  Patient completed Sit <> Stand Transfer with CGA with rolling walker   Patient completed Bed <> Chair Transfer using modified stand pivot technique with SBA/CGA without device  W/c mobility: CGA/SBA x150 feet with BUEs (CGA needed for steering)    Activities of Daily Living:  Upper Body Dressing: stand by assistance   Lower Body Dressing: contact guard assistance  Toileting: contact guard assistance     Cognitive/Visual Perceptual:  Cognitive/Psychosocial Skills:     -       Oriented to: Person, Place, Time, and Situation     Physical Exam:  Upper Extremity Range of Motion:     -       Right Upper Extremity: WFL  -       Left Upper Extremity: WFL  Upper Extremity Strength:    -       Right Upper Extremity: WFL  -       Left Upper Extremity: WFL    AMPAC 6 Click ADL:  AMPAC Total Score: 21    Treatment & Education:  Educated on role of OT, therapy schedule, and use of call bell.     Patient left up in w/c in gym for PT session.     GOALS:   Multidisciplinary Problems       Occupational Therapy Goals          Problem: Occupational Therapy    Goal Priority Disciplines Outcome Interventions   Occupational Therapy Goal     OT, PT/OT Progressing    Description: Goals to be met by: 6/4/24     Patient will increase functional independence with ADLs by performing:    LE Dressing with Contact Guard Assistance.  Grooming while standing at sink with Modified [place].  Toileting from toilet with Contact Guard Assistance for hygiene and clothing management.   Bathing from  shower chair/bench with Contact Guard Assistance.  Toilet transfer to  toilet with Stand-by Assistance.                         History:     Past Medical History:   Diagnosis Date    Anemia, unspecified     Diabetes mellitus     Hypertension          Past Surgical History:   Procedure Laterality Date    BELOW KNEE AMPUTATION OF LOWER EXTREMITY         Time Tracking:     OT Date of Treatment: 05/21/24  OT Start Time: 1300  OT Stop Time: 1330  OT Total Time (min): 30 min    Billable Minutes:Evaluation 15 min; self-care 15 min    5/21/2024

## 2024-05-21 NOTE — PT/OT/SLP PROGRESS
Occupational Therapy  Treatment    Name: Josep Angulo    : 1950 (73 y.o.)  MRN: [id number]           TREATMENT SUMMARY AND RECOMMENDATIONS:      OT Date of Treatment: 24  OT Start Time: 930  OT Stop Time: 1000  OT Total Time (min): 30 min      Subjective Assessment:   No complaints  Lethargic   x Awake, alert, cooperative  Impulsive    Uncooperative   Flat affect    Agitated  c/o pain    Appropriate  c/o fatigue    Confused  Treated at bedside     Emotionally labile x Treated in gym/dept.      Other:        Therapy Precautions:    Cognitive deficits  Spinal precautions    Collar - hard  Sternal precautions    Collar - soft   TLSO   x Fall risk  LSO    Hip precautions - posterior  Knee immobilizer    Hip precautions - anterior  WBAT    Impaired communication  Partial weightbearing    Oxygen  TTWB    PEG tube  NWB    Visual deficits      Hearing deficits   Other:        Treatment Objectives:     Mobility Training:    Mobility task Assist level Comments    Bed mobility Mod (I) EOB>supine   Transfer CGA/SBA Modified squat/pivot t/f w/c>EOB    Sit to stands transitions     Functional mobility     Sitting balance SBA Pt sat on edge of chair to perform functional reaching to floor level to grasp rings and then place onto [place] anteriorly across midline. Focus on ADL preparation to increase independence and safety.     Standing balance      Other:          Therapeutic Exercise:   Exercise Sets Reps Comments   3# dowel 2 10 Shoulder press, chest press, straight arm raises, bicep curls, forward rows, backwards rows   Red flexbar 1 20  Wrist flex/ext, forearm pro/sup   Sit to stands 2 5 CGA/SBA from standard chair             Assessment: Patient tolerated session well. Pt had positive response to today's treatment. Pt continues to make good progress towards goals. Pt would continue to benefit from skilled OT services to improve pt's safety and independence with daily occupations, decrease caregiver burden, and  reduce pt's risk of falls.     GOALS:   Multidisciplinary Problems       Occupational Therapy Goals          Problem: Occupational Therapy    Goal Priority Disciplines Outcome Interventions   Occupational Therapy Goal     OT, PT/OT Progressing    Description: Goals to be met by: 5/28/24     Patient will increase functional independence with ADLs by performing:    UE Dressing with Modified [place]. - met   LE Dressing with Contact Guard Assistance. - met   Grooming while seated at sink with Modified [place].  Toileting from bedside commode with Contact Guard Assistance for hygiene and clothing management.  - met  Bathing from  shower chair/bench with Contact Guard Assistance.  Toilet transfer to bedside commode with Contact Guard Assistance. - met                         Recommendations:     Discharge Equipment Recommendations:  wheelchair     Plan:     Patient to be seen 6 x/week (QD/BID) to address the above listed problems via self-care/home management, therapeutic activities, therapeutic exercises, wheelchair management/training  Plan of Care Expires: 05/28/24  Plan of Care Reviewed with: patient  Revisions made to plan of care: No      Skilled OT Minutes Provided: 30  Communication with Treatment Team:     Equipment recommendations:       At end of treatment, patient remained:   Up in chair     Up in wheelchair in room   x In bed   x With alarm activated   x Bed rails up   x Call bell in reach     Family/friends present    Restraints secured properly    In bathroom with CNA/RN notified    In gym with PT/PTA/tech    Nurse aware    Other:

## 2024-05-21 NOTE — PLAN OF CARE
Left message with  at [facility] requesting update on referral. Awaiting call back.    Spoke with Daughter, Nellie, about other nursing home facilities. She stated to speak with patient to see where he would be willing to go.     13:22- received call from Giana Copeland at [facility] who stated she will be sending information to [facility] for auth and will update when she has new information

## 2024-05-21 NOTE — PLAN OF CARE
Problem: Occupational Therapy  Goal: Occupational Therapy Goal  Description: Goals to be met by: 6/4/24     Patient will increase functional independence with ADLs by performing:    LE Dressing with Contact Guard Assistance.  Grooming while standing at sink with Modified [place].  Toileting from toilet with Contact Guard Assistance for hygiene and clothing management.   Bathing from  shower chair/bench with Contact Guard Assistance.  Toilet transfer to toilet with Stand-by Assistance.    Outcome: Progressing

## 2024-05-21 NOTE — PLAN OF CARE
Problem: Physical Therapy  Goal: Physical Therapy Goal  Description: Description: Goals to be met by: Discharge       Patient will increase functional independence with mobility by performin. Sit to stand transfer with Supervision  2. Bed to chair transfer with Supervision using Rolling Walker  3. Wheelchair propulsion x 300 feet with Modified [place] using bilateral uppper extremities.   4. Gait x 50 feet at SPV levels using RW with or without prosthesis (currently poor fitting and needs  follow op)    Outcome: Progressing

## 2024-05-21 NOTE — H&P
[facility] - Medical Surgical Unit  [facility] MEDICINE - H&P ADMISSION NOTE    Patient Name: Josep Angulo  MRN: [id number]  Patient Class: IP- Swing   Admission Date: (Not on file)   Admitting Physician:  Service   Attending Physician: ALICE Hilton  Primary Care Provider: Sami Rothman MD  Face-to-Face encounter date: 05/21/2024      CHIEF COMPLAINT     No chief complaint on file.    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 Daughter reports patient was unable to follow commands and falling asleep easily. Upon arrival to the ER, vitals revealed elevated temperature reading and mildly elevated BP. Labs were significant for leukocytosis, hypoglycemia with a glucose of 28, elevated CRP. Patient was given a D10 infusion with improvement of glucose to 126. CXR revealed prominent interstitial markings with no focal opacification, no acute cardiopulmonary process appreciated. CT head revealed no acute intracranial abnormality identified with chronic microvascular ischemic disease. Daughter at bedside brought patient's home medication bottles. Patient had 4 bottles of Metformin 1000mg BID which daughter reports patient only takes once daily and 3 bottles of Glimepiride 2mg BID. Daughter reports she gets help from a nurse who comes to her home to organize the patient's medication therefore she does not believe he could have taken too much medication. On exam, CBG registered as <20. He did receive a one time dose of Octreotide 50mcg and D5NS at 100cc/hr. Patient is being admitted to hospital medicine service.      His glucose improved after the dose of octreotide.  D5 NS was able to be discontinued.  He is now on metformin a 1000 mg b.i.d. and tolerating well.  Blood  pressure was noted to be elevated and home lisinopril has been increased to 20 mg daily starting today.  Patient is being transitioned to our swing unit for continued therapy while awaiting placement at nursing home.    PAST MEDICAL HISTORY     Past Medical History:   Diagnosis Date    Anemia, unspecified     Diabetes mellitus     Hypertension      PAST SURGICAL HISTORY     Past Surgical History:   Procedure Laterality Date    BELOW KNEE AMPUTATION OF LOWER EXTREMITY       FAMILY HISTORY     Reviewed and noncontributory to this case    SOCIAL HISTORY     Social History     Socioeconomic History    Marital status:    Tobacco Use    Smoking status: Never    Smokeless tobacco: Never     Social Determinants of Health     Financial Resource Strain: Low Risk  (5/14/2024)    Overall Financial Resource Strain (CARDIA)     Difficulty of Paying Living Expenses: Not hard at all   Food Insecurity: No Food Insecurity (5/14/2024)    Hunger Vital Sign     Worried About Running Out of Food in the Last Year: Never true     Ran Out of Food in the Last Year: Never true   Transportation Needs: No Transportation Needs (5/14/2024)    TRANSPORTATION NEEDS     Transportation : No   Physical Activity: Inactive (5/14/2024)    Exercise Vital Sign     Days of Exercise per Week: 0 days     Minutes of Exercise per Session: 0 min   Stress: Stress Concern Present (5/14/2024)    [place] [employer] - Occupational Stress Questionnaire     Feeling of Stress : To some extent   Housing Stability: Low Risk  (5/14/2024)    Housing Stability Vital Sign     Unable to Pay for Housing in the Last Year: No     Homeless in the Last Year: No     Patient's screen for food insecurity, housing instability, transportation needs, utility difficulties, and interpersonal safety. Social work consulted for discharge planning, communicating with patient's insurance, obtaining swing bed approval, sending information to nursing homes for  placement.      HOME MEDICATIONS     Prior to Admission medications    Medication Sig Start Date End Date Taking? Authorizing Provider   aspirin (ECOTRIN) 81 MG EC tablet Take 81 mg by mouth once daily.    Provider, Historical   ferrous gluconate (FERGON) 240 (27 FE) MG tablet Take 1 tablet by mouth 3 (three) times daily with meals. 4/4/24   Provider, Historical   glimepiride (AMARYL) 2 MG tablet Take 2 mg by mouth 2 (two) times daily. 4/11/24   Provider, Historical   JARDIANCE 25 mg tablet Take 25 mg by mouth once daily. 4/11/24   Provider, Historical   lisinopriL (PRINIVIL,ZESTRIL) 5 MG tablet Take 5 mg by mouth once daily. 4/11/24   Provider, Historical   metFORMIN (GLUCOPHAGE) 1000 MG tablet Take 1,000 mg by mouth 2 (two) times daily. 4/11/24   Provider, Historical   mirtazapine (REMERON) 15 MG tablet Take 15 mg by mouth every evening. 5/6/24   Provider, Historical   tamsulosin (FLOMAX) 0.4 mg Cap Take 1 capsule by mouth once daily. 5/6/24   Provider, Historical     ALLERGIES     Patient has no known allergies.    REVIEW OF SYSTEMS     Except as documented above, all other systems reviewed and negative    PHYSICAL EXAM     There were no vitals filed for this visit.     General:  In no acute distress, resting comfortably  Head and neck:  Atraumatic, normocephalic, moist mucous membranes  Chest:  Clear to auscultation bilaterally  Heart:  S1, S2, no appreciable murmur  Abdomen:  Soft, nontender, BS +  MSK:  Warm, no lower extremity edema, no clubbing or cyanosis, left BKA   Neuro:  Alert and oriented x4, moving all extremities with good strength  Integumentary:  See media   Psychiatry:  Appropriate mood and affect    ASSESSMENT AND PLAN     Sulfonylurea induced hypoglycemia   Uncontrolled DM2   Hold home Glimepiride   Received D10 in ER   Received Octreotide 50mcg  ACHS CBGs   Hypoglycemic protocol  A1c 6%   Continue Metformin to 1000mg BID      Acute metabolic encephalopathy - resolved   2/2 hypoglycemia and  receiving sedating medications in the ER   See above treatment  Hold sedating medications   CT head revealed no acute intracranial abnormality identified with chronic microvascular ischemic disease     Leukocytosis - resolved   UA without UTI   CXR without acute findings     Covid, flu, RSV negative      HTN  Increase Lisinopril to 20mg on 05/21/2024     BPH  Continue home Flomax     Iron deficiency   Continue home iron supplementation     Bipolar disorder, insomnia, dementia  Continue home Mirtazapine when patient appropriate   Daughter requesting NH placement at [facility] consulted      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   PT/OT - needs appointment with [facility]    DVT prophylaxis:  Lovenox   __________________________________________________________________  LABS/MICRO/MEDS/DIAGNOSTICS     LABS  Recent Labs     05/20/24  0432   *   K 5.0   CHLORIDE 99   CO2 27   BUN 18.4   CREATININE 1.11   GLUCOSE 170*   CALCIUM 9.1     Recent Labs     05/20/24  0432   WBC 9.26   RBC 4.29*   HCT 36.2*   MCV 84.4        MICROBIOLOGY  Microbiology Results (last 7 days)       ** No results found for the last 168 hours. **          MEDICATIONS     INFUSIONS    DIAGNOSTIC TESTS  No orders to display      Patient information was obtained from patient, patient's family, past medical records and ER records.   All diagnosis and differential diagnosis have been reviewed; assessment and plan has been documented. I have personally reviewed the labs and test results that are presently available; I have reviewed the patients medication list. I have reviewed the consulting providers response and recommendations. I have reviewed or attempted to review medical records based upon their availability.  All of the patient's questions have been addressed and answered. Patient's is agreeable to the above stated plan. I will continue to monitor closely and make adjustments to medical management as needed.  This note was created  using MModal voice recognition software that occasionally misinterpreted phrases or words.  Please contact me if any questions may rise regarding documentation to clarify verbiage.      ALICE Hilton   Internal Medicine  Department of Hospital Medicine [facility] Surgical Unit

## 2024-05-21 NOTE — PT/OT/SLP EVAL
Physical Therapy IP Evaluation     Patient Name: Josep Angulo   MRN: [id number]  Recent Surgery: * No surgery found *      Recommendations:     Discharge Recommendations: Low Intensity Therapy   Discharge Equipment Recommendations: none   Barriers to discharge: Increased level of assist, Decreased caregiver support, and Ongoing medical treatment    Assessment:     Josep Angulo is a 73 y.o. male admitted with a medical diagnosis of Hypoglycemia associated with diabetes. He presents with the following impairments/functional limitations: visual deficits, impaired balance, impaired cognition, decreased coordination, decreased safety awareness. Pt states he typically ambulates with prosthetic on LLE however has lost sock and needs readjustment of prosthetic with . Daughter reports wanting NH placement on [place]. 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    Rehab Prognosis: Fair; patient would benefit from acute PT services to address these deficits and reach maximum level of function.    Plan:     During this hospitalization, patient to be seen  (5-6 days per week 1-2 times per day) to address the above listed problems via gait training, therapeutic activities, therapeutic exercises, wheelchair management/training    Plan of Care Expires: 06/18/24    Subjective     Chief Complaint: Wanting to get his leg fixed at .   Patient Comments/Goals: wants to go back home, does not want to go back to [facility].   Pain/Comfort:  Pain Rating 1: 0/10  Pain Rating Post-Intervention 1: 0/10    Social History:  Living Environment: Patient lives with their daughter in a single story home   Prior Level of Function: Prior to admission, patient was modified independent  Equipment Used at Home: walker, rolling, bedside commode  DME owned (not currently used):  none  Assistance Upon Discharge: facility staff    Objective:     Communicated with Nursing prior to session. Patient found in therapy gym with OT.     General Precautions: Standard, fall   Orthopedic Precautions:     Braces:      Respiratory Status: Room air     Exams:  Cognition: Patient is oriented to Person, Place, Time, Situation  RLE ROM: WNL  RLE Strength: WNL  LLE ROM: WNL  BKA  LLE Strength: WNL  BKA  Gross Motor Coordination: abnormal.     Functional Mobility:  Gait belt applied - Yes  Bed Mobility  Rolling Left: stand by assistance  Rolling Right: stand by assistance  Supine to Sit: contact guard assistance for trunk management  Sit to Supine: contact guard assistance for LE management  Transfers  Sit to Stand: minimum assistance with rolling walker  Bed to Chair: minimum assistance with rolling walker using Stand Pivot  Gait  X 25 feet using RW and 3 point gait pattern/prosthesis off at MIN A.   Balance  Sitting: stand by assistance   Standing: minimum assistance     Wheelchair Propulsion: Pt propelled Standard wheelchair x 75 feet Level tile with  Bilateral upper extremity with SBA and verbal cues needed for safety. Pt often tries to use 1 arm and runs into objects.      Therapeutic Activities and Exercises:   Patient educated on role of acute care PT and PT POC, safety while in hospital including calling nurse for mobility, and call light usage  Patient educated about importance of OOB mobility and remaining up in chair most of the day.  Pt is safe to transfer with staff using RW, gait belt and MIN A.   Seated BLE EX completed x 30 reps with 2# wt all functional planes and standing LLE EX SLR and hip ext using RW x 20    AM-PAC 6 CLICK MOBILITY  Total Score:18    Patient left up in chair with all lines intact, call button in reach, RN notified.  GOALS:   Multidisciplinary Problems       Physical Therapy Goals          Problem: Physical Therapy    Goal Priority Disciplines Outcome Goal Variances  Interventions   Physical Therapy Goal     PT, PT/OT Progressing     Description: Description: Goals to be met by: Discharge       Patient will increase functional independence with mobility by performin. Sit to stand transfer with Supervision  2. Bed to chair transfer with Supervision using Rolling Walker  3. Wheelchair propulsion x 300 feet with Modified [place] using bilateral uppper extremities.   4. Gait x 50 feet at SPV levels using RW with or without prosthesis (currently poor fitting and needs  follow op)                         History:     Past Medical History:   Diagnosis Date    Anemia, unspecified     Diabetes mellitus     Hypertension        Past Surgical History:   Procedure Laterality Date    BELOW KNEE AMPUTATION OF LOWER EXTREMITY         Time Tracking:     PT Received On: 24  PT Start Time: 1330  PT Stop Time: 1353  PT Total Time (min): 23 min     Billable Minutes: Evaluation low complexity     2024

## 2024-05-21 NOTE — PLAN OF CARE
Problem: Adult Inpatient Plan of Care  Goal: Plan of Care Review  Outcome: Progressing  Flowsheets (Taken 5/20/2024 1926)  Plan of Care Reviewed With: patient  Goal: Patient-Specific Goal (Individualized)  Outcome: Progressing  Flowsheets (Taken 5/20/2024 1926)  Individualized Care Needs: Safety with transers, Monitor CBG, Maintain skin integrity  Anxieties, Fears or Concerns: Going home  Goal: Absence of Hospital-Acquired Illness or Injury  Outcome: Progressing  Intervention: Identify and Manage Fall Risk  Flowsheets (Taken 5/20/2024 1926)  Safety Promotion/Fall Prevention:   assistive device/personal item within reach   bed alarm set   muscle strengthening facilitated   nonskid shoes/socks when out of bed   lighting adjusted   medications reviewed   room near unit station  Intervention: Prevent Skin Injury  Flowsheets (Taken 5/20/2024 1926)  Body Position: sitting up in bed  Skin Protection:   incontinence pads utilized   skin sealant/moisture barrier applied  Device Skin Pressure Protection:   absorbent pad utilized/changed   positioning supports utilized   tubing/devices free from skin contact  Intervention: Prevent and Manage VTE (Venous Thromboembolism) Risk  Flowsheets (Taken 5/20/2024 1926)  VTE Prevention/Management:   bleeding risk assessed   bleeding precations maintained   ROM (active) performed   fluids promoted  Intervention: Prevent Infection  Flowsheets (Taken 5/20/2024 1926)  Infection Prevention:   cohorting utilized   hand hygiene promoted   single patient room provided  Goal: Optimal Comfort and Wellbeing  Outcome: Progressing  Intervention: Monitor Pain and Promote Comfort  Flowsheets (Taken 5/20/2024 1926)  Pain Management Interventions:   diversional activity provided   pillow support provided   position adjusted   quiet environment facilitated  Intervention: Provide Person-Centered Care  Flowsheets (Taken 5/20/2024 1926)  Trust Relationship/Rapport:   care explained   choices provided    questions answered   thoughts/feelings acknowledged     Problem: Diabetes Comorbidity  Goal: Blood Glucose Level Within Targeted Range  Outcome: Progressing  Intervention: Monitor and Manage Glycemia  Flowsheets (Taken 5/20/2024 1926)  Glycemic Management: blood glucose monitored     Problem: Infection  Goal: Absence of Infection Signs and Symptoms  Outcome: Progressing  Intervention: Prevent or Manage Infection  Flowsheets (Taken 5/20/2024 1926)  Fever Reduction/Comfort Measures:   lightweight clothing   lightweight bedding  Infection Management: aseptic technique maintained  Isolation Precautions:   protective   precautions maintained     Problem: Skin Injury Risk Increased  Goal: Skin Health and Integrity  Outcome: Progressing  Intervention: Optimize Skin Protection  Flowsheets (Taken 5/20/2024 1926)  Pressure Reduction Techniques:   frequent weight shift encouraged   rest period provided between sit times   weight shift assistance provided  Pressure Reduction Devices: positioning supports utilized  Skin Protection:   incontinence pads utilized   skin sealant/moisture barrier applied  Activity Management: Walk with assistive devise and /or staff member - L3  Head of Bed (HOB) Positioning: HOB at 30-45 degrees  Intervention: Promote and Optimize Oral Intake  Flowsheets (Taken 5/20/2024 1926)  Oral Nutrition Promotion:   adaptive equipment use encouraged   social interaction promoted  Nutrition Interventions:   meal set-up provided   food preferences provided     Problem: Fall Injury Risk  Goal: Absence of Fall and Fall-Related Injury  Outcome: Progressing  Intervention: Identify and Manage Contributors  Flowsheets (Taken 5/20/2024 1926)  Self-Care Promotion:   BADL personal objects within reach   BADL personal routines maintained   adaptive equipment use encouraged  Medication Review/Management: medications reviewed  Intervention: Promote Injury-Free Environment  Flowsheets (Taken 5/20/2024 1926)  Safety  Promotion/Fall Prevention:   assistive device/personal item within reach   bed alarm set   muscle strengthening facilitated   nonskid shoes/socks when out of bed   lighting adjusted   medications reviewed   room near unit station     Problem: Mobility Impairment  Goal: Optimal Mobility  Outcome: Progressing  Intervention: Optimize Mobility  Flowsheets (Taken 5/20/2024 1926)  Assistive Device Utilized:   gait belt   walker  Activity Management: Walk with assistive devise and /or staff member - L3  Positioning/Transfer Devices:   pillows   in use     Problem: Wound  Goal: Optimal Coping  Outcome: Progressing  Intervention: Support Patient and Family Response  Flowsheets (Taken 5/20/2024 1926)  Supportive Measures:   active listening utilized   goal-setting facilitated   self-care encouraged  Family/Support System Care: support provided  Goal: Optimal Functional Ability  Outcome: Progressing  Intervention: Optimize Functional Ability  Flowsheets (Taken 5/20/2024 1926)  Assistive Device Utilized:   gait belt   walker  Activity Management: Walk with assistive devise and /or staff member - L3  Activity Assistance Provided: assistance, 2 people  Goal: Absence of Infection Signs and Symptoms  Outcome: Progressing  Intervention: Prevent or Manage Infection  Flowsheets (Taken 5/20/2024 1926)  Fever Reduction/Comfort Measures:   lightweight clothing   lightweight bedding  Infection Management: aseptic technique maintained  Isolation Precautions:   protective   precautions maintained  Goal: Improved Oral Intake  Outcome: Progressing  Intervention: Promote and Optimize Oral Intake  Flowsheets (Taken 5/20/2024 1926)  Oral Nutrition Promotion:   adaptive equipment use encouraged   social interaction promoted  Nutrition Interventions:   meal set-up provided   food preferences provided  Goal: Optimal Pain Control and Function  Outcome: Progressing  Intervention: Prevent or Manage Pain  Flowsheets (Taken 5/20/2024 1926)  Sleep/Rest  Enhancement:   consistent schedule promoted   regular sleep/rest pattern promoted  Pain Management Interventions:   diversional activity provided   pillow support provided   position adjusted   quiet environment facilitated  Goal: Skin Health and Integrity  Outcome: Progressing  Intervention: Optimize Skin Protection  Flowsheets (Taken 5/20/2024 1926)  Pressure Reduction Techniques:   frequent weight shift encouraged   rest period provided between sit times   weight shift assistance provided  Pressure Reduction Devices: positioning supports utilized  Skin Protection:   incontinence pads utilized   skin sealant/moisture barrier applied  Activity Management: Walk with assistive devise and /or staff member - L3  Head of Bed (HOB) Positioning: HOB at 30-45 degrees  Goal: Optimal Wound Healing  Outcome: Progressing  Intervention: Promote Wound Healing  Flowsheets (Taken 5/20/2024 1926)  Sleep/Rest Enhancement:   consistent schedule promoted   regular sleep/rest pattern promoted

## 2024-05-21 NOTE — PLAN OF CARE
Problem: Occupational Therapy  Goal: Occupational Therapy Goal  Description: Goals to be met by: 5/28/24     Patient will increase functional independence with ADLs by performing:    UE Dressing with Modified [place]. - met   LE Dressing with Contact Guard Assistance. - met   Grooming while seated at sink with Modified [place].  Toileting from bedside commode with Contact Guard Assistance for hygiene and clothing management.  - met  Bathing from  shower chair/bench with Contact Guard Assistance.  Toilet transfer to bedside commode with Contact Guard Assistance. - met    Outcome: Progressing

## 2024-05-21 NOTE — PROGRESS NOTES
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 Daughter reports patient was unable to follow commands and falling asleep easily. Upon arrival to the ER, vitals revealed elevated temperature reading and mildly elevated BP. Labs were significant for leukocytosis, hypoglycemia with a glucose of 28, elevated CRP. Patient was given a D10 infusion with improvement of glucose to 126. CXR revealed prominent interstitial markings with no focal opacification, no acute cardiopulmonary process appreciated. CT head revealed no acute intracranial abnormality identified with chronic microvascular ischemic disease. Daughter at bedside brought patient's home medication bottles. Patient had 4 bottles of Metformin 1000mg BID which daughter reports patient only takes once daily and 3 bottles of Glimepiride 2mg BID. Daughter reports she gets help from a nurse who comes to her home to organize the patient's medication therefore she does not believe he could have taken too much medication. On exam, CBG registered as <20. He did receive a one time dose of Octreotide 50mcg and D5NS at 100cc/hr. Patient is being admitted to hospital medicine service.     Today:  No reported complaints today. Increased Lisinopril to 2mg QD.     OBJECTIVE/PHYSICAL EXAM     VITAL SIGNS (MOST RECENT):  Temp: 97.7 °F (36.5 °C) (05/21/24 0900)  Pulse: 86 (05/21/24 0900)  Resp: 18 (05/21/24 0900)  BP: (!) 142/80 (05/21/24 0900)  SpO2: 95 % (05/21/24 0900) VITAL SIGNS (24 HOUR RANGE):  Temp:  [97.5 °F (36.4 °C)-97.7 °F (36.5 °C)] 97.7 °F (36.5 °C)  Pulse:  [84-86] 86  Resp:  [18] 18  SpO2:  [95 %] 95 %  BP: (142-152)/(80-83)  142/80     GENERAL: In no acute distress, afebrile  HEENT: Atraumatic, normocephalic, moist mucous membranes  CHEST: Clear to auscultation bilaterally  HEART: S1, S2, no appreciable murmur  ABDOMEN: Soft, nontender, BS +  MSK: Warm, no lower extremity edema, no clubbing or cyanosis, left BKA  NEUROLOGIC: Alert and oriented x4, moving all extremities with good strength   INTEGUMENTARY: See media  PSYCHIATRY: Appropriate mood and affect     ASSESSMENT/PLAN     Sulfonylurea induced hypoglycemia   Uncontrolled DM2   Hold home Glimepiride   Received D10 in ER   Received Octreotide 50mcg  [facility] CBGs   Hypoglycemic protocol  A1c 6%   Continue Metformin to 1000mg BID      Acute metabolic encephalopathy - resolved   2/2 hypoglycemia and receiving sedating medications in the ER   See above treatment  Hold sedating medications   CT head revealed no acute intracranial abnormality identified with chronic microvascular ischemic disease     Leukocytosis - resolved   UA without UTI   CXR without acute findings     Covid, flu, RSV negative      HTN  Increase Lisinopril to 20mg on 05/21/2024     BPH  Continue home Flomax     Iron deficiency   Continue home iron supplementation     Bipolar disorder, insomnia, dementia  Continue home Mirtazapine when patient appropriate   Daughter requesting NH placement at [facility] consulted      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   PT/OT - needs appointment with Khoi     DVT prophylaxis:  Lovenox     Anticipated discharge and disposition: Continue to monitor glucose, continue PT/OT, family requesting NH placement   __________________________________________________________________________    LABS/MICRO/MEDS/DIAGNOSTICS     LABS  Recent Labs     05/20/24  0432   *   K 5.0   CHLORIDE 99   CO2 27   BUN 18.4   CREATININE 1.11   GLUCOSE 170*   CALCIUM 9.1     Recent Labs     05/20/24  0432   WBC 9.26   RBC 4.29*   HCT 36.2*   MCV 84.4        MICROBIOLOGY  Microbiology Results (last  "7 days)       Procedure Component Value Units Date/Time    Blood Culture [[id number]]  (Normal) Collected: 05/13/24 1436    Order Status: Completed Specimen: Blood Updated: 05/18/24 1800     Blood Culture No Growth at 5 days    Blood Culture [[id number]]  (Normal) Collected: 05/13/24 1436    Order Status: Completed Specimen: Blood Updated: 05/18/24 1800     Blood Culture No Growth at 5 days             MEDICATIONS   aspirin  81 mg Oral Daily    citalopram  10 mg Oral Daily    enoxparin  40 mg Subcutaneous Daily    ferrous sulfate  1 tablet Oral Daily    lisinopriL  20 mg Oral Daily    metFORMIN  1,000 mg Oral BID    mirtazapine  15 mg Oral QHS    tamsulosin  0.4 mg Oral Daily         INFUSIONS       DIAGNOSTIC TESTS  CT Head Without Contrast   Final Result      No acute intracranial abnormality identified.  Findings of chronic microvascular ischemic disease.         Electronically signed by: Jeffrey Gamez   Date:    05/13/2024   Time:    13:35      X-Ray Chest 1 View   Final Result      Prominent interstitial markings with no focal opacification.  No acute cardiopulmonary process appreciated.         Electronically signed by: Jeffrey Gamez   Date:    05/13/2024   Time:    11:11         No results found for: "EF"     NUTRITION STATUS  Patient meets ASPEN criteria for   malnutrition of other (see comments) (doesn't meet criteria) per RD assessment as evidenced by:                       A minimum of two characteristics is recommended for diagnosis of either severe or non-severe malnutrition.     Case related differential diagnoses have been reviewed; assessment and plan has been documented. I have personally reviewed the labs and test results that are currently available; I have reviewed the patients medication list. I have reviewed the consulting providers recommendations. I have reviewed or attempted to review medical records based upon their availability.  All of the patient's and/or family's questions have been " addressed and answered to the best of my ability.  I will continue to monitor closely and make adjustments to medical management as needed.  This document was created using M*Modal Fluency Direct.  Transcription errors may have been made.  Please contact me if any questions may rise regarding documentation to clarify transcription.      ALICE Hilton   Internal Medicine  Department of [place] [facility]  [facility]

## 2024-05-21 NOTE — PLAN OF CARE
Spoke with patient concerning other Nursing homes to send referrals to. He would like to send referrals to [facility], [facility] and [facility] [place].

## 2024-05-21 NOTE — DISCHARGE SUMMARY
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 Daughter reports patient was unable to follow commands and falling asleep easily. Upon arrival to the ER, vitals revealed elevated temperature reading and mildly elevated BP. Labs were significant for leukocytosis, hypoglycemia with a glucose of 28, elevated CRP. Patient was given a D10 infusion with improvement of glucose to 126. CXR revealed prominent interstitial markings with no focal opacification, no acute cardiopulmonary process appreciated. CT head revealed no acute intracranial abnormality identified with chronic microvascular ischemic disease. Daughter at bedside brought patient's home medication bottles. Patient had 4 bottles of Metformin 1000mg BID which daughter reports patient only takes once daily and 3 bottles of Glimepiride 2mg BID. Daughter reports she gets help from a nurse who comes to her home to organize the patient's medication therefore she does not believe he could have taken too much medication. On exam, CBG registered as <20. He did receive a one time dose of Octreotide 50mcg and D5NS at 100cc/hr. Patient is being admitted to hospital medicine service.     His glucose improved after the dose of octreotide.  D5 NS was able to be discontinued.  He is now on metformin a 1000 mg b.i.d. and tolerating well.  Blood pressure was noted to be elevated and home lisinopril has been increased to 20 mg daily starting today.  Patient is  being transitioned to our swing unit for continued therapy while awaiting placement at nursing home.    PHYSICAL EXAM     Most Recent Vital Signs:  Temp: 97.7 °F (36.5 °C) (05/21/24 0900)  Pulse: 86 (05/21/24 0900)  Resp: 18 (05/21/24 0900)  BP: (!) 142/80 (05/21/24 0900)  SpO2: 95 % (05/21/24 0900)     GENERAL: In no acute distress, afebrile  HEENT: Atraumatic, normocephalic, moist mucous membranes  CHEST: Clear to auscultation bilaterally  HEART: S1, S2, no appreciable murmur  ABDOMEN: Soft, nontender, BS +  MSK: Warm, no lower extremity edema, no clubbing or cyanosis, left BKA  NEUROLOGIC: Alert and oriented x4, moving all extremities with good strength   INTEGUMENTARY: See media  PSYCHIATRY: Appropriate mood and affect     DISCHARGE DIAGNOSIS     Sulfonylurea induced hypoglycemia   Uncontrolled DM2   Hold home Glimepiride   Received D10 in ER   Received Octreotide 50mcg  [facility] CBGs   Hypoglycemic protocol  A1c 6%   Continue Metformin to 1000mg BID      Acute metabolic encephalopathy - resolved   2/2 hypoglycemia and receiving sedating medications in the ER   See above treatment  Hold sedating medications   CT head revealed no acute intracranial abnormality identified with chronic microvascular ischemic disease     Leukocytosis - resolved   UA without UTI   CXR without acute findings     Covid, flu, RSV negative      HTN  Increase Lisinopril to 20mg on 05/21/2024     BPH  Continue home Flomax     Iron deficiency   Continue home iron supplementation     Bipolar disorder, insomnia, dementia  Continue home Mirtazapine when patient appropriate   Daughter requesting NH placement at [facility] consulted      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   PT/OT - needs appointment with Khoi    Patient's screen for food insecurity, housing instability, transportation needs, utility difficulties, and interpersonal safety. Social work consulted for discharge planning, communicating with patient's insurance, obtaining swing  bed approval, sending information to nursing homes for placement.   _____________________________________________________________________________    DISCHARGE MED REC     Current Discharge Medication List        CONTINUE these medications which have NOT CHANGED    Details   aspirin (ECOTRIN) 81 MG EC tablet Take 81 mg by mouth once daily.      ferrous gluconate (FERGON) 240 (27 FE) MG tablet Take 1 tablet by mouth 3 (three) times daily with meals.      glimepiride (AMARYL) 2 MG tablet Take 2 mg by mouth 2 (two) times daily.      JARDIANCE 25 mg tablet Take 25 mg by mouth once daily.      lisinopriL (PRINIVIL,ZESTRIL) 5 MG tablet Take 5 mg by mouth once daily.      metFORMIN (GLUCOPHAGE) 1000 MG tablet Take 1,000 mg by mouth 2 (two) times daily.      mirtazapine (REMERON) 15 MG tablet Take 15 mg by mouth every evening.      tamsulosin (FLOMAX) 0.4 mg Cap Take 1 capsule by mouth once daily.            CONSULTS     Consults (From admission, onward)          Status Ordering Provider     Inpatient consult to Telemedicine - Psychiatry  Once        Provider:  [facility]    Completed REYES, THAIRY G     Inpatient consult to Social Work/Case Management  Once        Provider:  (Not yet assigned)    Acknowledged JAZMIN GUY          FOLLOW UP      Follow-up Information       Sami Rothman MD Follow up.    Specialty: Family Medicine  Contact information:  [facility] [address] [place] [address]  [phone]                           DISCHARGE INSTRUCTIONS     Explained in detail to the patient about the discharge plan, medications, and follow-up visits. Pt understands and agrees with the treatment plan.  Discharged Condition: stable  Diet as tolerated  Activities as tolerated  Discharge to: Swing Bed    TIME SPENT ON DISCHARGE     35 minutes    ALICE Hilton  Internal Medicine  Department of Hospital Medicine  [facility]    This document was created using  electronic dictation services.  Please excuse any errors that may have been made.  Contact me if any questions regarding documentation to clarify verbiage.

## 2024-05-21 NOTE — PROGRESS NOTES
Inpatient Nutrition Assessment    Admit Date: 5/21/2024   Total duration of encounter: 1 day   Patient Age: 73 y.o.    Nutrition Recommendation/Prescription     Continue diabetic 2,000 kcal diet 2. Monitor intake, wt, labs, medications    Communication of Recommendations: reviewed with patient    Nutrition Assessment     Malnutrition Assessment/Nutrition-Focused Physical Exam                                                                A minimum of two characteristics is recommended for diagnosis of either severe or non-severe malnutrition.    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     Relevant Medical History: HTN, HLD, DM2, iron deficiency, bipolar disorder, insomnia, dementia, and 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     Scheduled Medications:  [START ON 5/22/2024] aspirin, 81 mg, Daily  [START ON 5/22/2024] citalopram, 10 mg, Daily  enoxparin, 40 mg, Daily  [START ON 5/22/2024] ferrous sulfate, 1 tablet, Daily  [START ON 5/22/2024] lisinopriL, 20 mg, Daily  metFORMIN, 1,000 mg, BID  mirtazapine, 15 mg, QHS  [START ON 5/22/2024] tamsulosin, 0.4 mg, Daily    Continuous Infusions:   PRN Medications:  acetaminophen, 650 mg, Q4H PRN  albuterol-ipratropium, 3 mL, Q6H PRN  aluminum-magnesium hydroxide-simethicone, 30 mL, QID PRN  bisacodyL, 10 mg, Daily PRN  dextrose 10%, 12.5 g, PRN  dextrose 10%, 25 g, PRN  glucagon (human recombinant), 1 mg, PRN  glucose, 16 g, PRN  glucose, 24 g, PRN  guaiFENesin 100 mg/5 ml,  "200 mg, Q4H PRN  hydrALAZINE, 5 mg, Q4H PRN  HYDROcodone-acetaminophen, 1 tablet, Q6H PRN  insulin aspart U-100, 0-5 Units, QID (AC + HS) PRN  melatonin, 9 mg, Nightly PRN  morphine, 2 mg, Q6H PRN  naloxone, 0.02 mg, PRN  octreotide, 50 mcg, Q6H PRN  ondansetron, 8 mg, Q8H PRN  ondansetron, 4 mg, Q8H PRN  polyethylene glycol, 17 g, TID PRN  simethicone, 1 tablet, QID PRN  sodium chloride 0.9%, 10 mL, PRN    Calorie Containing IV Medications: no significant kcals from medications at this time    Recent Labs   Lab 05/20/24  0432   *   K 5.0   CALCIUM 9.1   CHLORIDE 99   CO2 27   BUN 18.4   CREATININE 1.11   EGFRNORACEVR >60   GLUCOSE 170*   WBC 9.26   HGB 12.0*   HCT 36.2*     Nutrition Orders:  Diet diabetic 2000 Calorie      Appetite/Oral Intake: good/% of meals  Factors Affecting Nutritional Intake: chewing difficulty  Social Needs Impacting Access to Food: none identified  Food/[employer]/Cultural Preferences: none reported  Food Allergies: none reported  Last Bowel Movement: 05/21/24  Wound(s):      Comments  05/17/24: Pt intake is good. Will continue to monitor.      05/16/24:Pt intake is good. Pt states doesn't have dentures with him, chopping meal per patient request. Discussed food preferences. Marked menus.     05/21/24: Pt reports intake is good. Pt have no c/o of. Will continue to monitor. Chopping meal in kitchen per patient request.   Anthropometrics    Height: 5' 8" (172.7 cm), Height Method: Stated  Last Weight: 75.2 kg (165 lb 12.6 oz) (05/21/24 1437), Weight Method: Bed Scale  BMI (Calculated): 25.2  BMI Classification: overweight (BMI 25-29.9)        Ideal Body Weight (IBW), Male: 154 lb     % Ideal Body Weight, Male (lb): 107.66 %   Adjust IBW for amputation left BKA: 148.1 lb  Amputee BMI (kgm2) 27.18                       Usual Weight Provided By: patient denies unintentional weight loss    Wt Readings from Last 5 Encounters:   05/21/24 75.2 kg (165 lb 12.6 oz)   05/20/24 75.2 kg (165 " lb 12.6 oz)   04/18/24 80.7 kg (178 lb)     Weight Change(s) Since Admission:   Wt Readings from Last 1 Encounters:   05/21/24 1437 75.2 kg (165 lb 12.6 oz)   05/21/24 1137 75.2 kg (165 lb 12.6 oz)   Admit Weight: 75.2 kg (165 lb 12.6 oz) (05/21/24 1137), Weight Method: Bed Scale    Estimated Needs    Weight Used For Calorie Calculations: 75.2 kg (165 lb 12.6 oz)  Energy Calorie Requirements (kcal): 1880 kcal (25 kcal/kg/CBW)  Energy Need Method: Kcal/kg  Weight Used For Protein Calculations: 75.2 kg (165 lb 12.6 oz)  Protein Requirements: 75.36 gm (1.0 gm/kg/CBW)  Fluid Requirements (mL): 1,880 mL (1 mL/kcal)  CHO Requirement: 212 gm CHO per day     Enteral Nutrition     Patient not receiving enteral nutrition at this time.    Parenteral Nutrition     Patient not receiving parenteral nutrition support at this time.    Evaluation of Received Nutrient Intake    Calories: meeting estimated needs  Protein: meeting estimated needs    Patient Education     Not applicable.    Nutrition Diagnosis     PES: No nutrition diagnosis at this time    Nutrition Interventions     Intervention(s): general/healthful diet    Goal: Meet greater than 80% of nutritional needs by follow-up. (goal met)      Nutrition Goals & Monitoring     Dietitian will monitor: food and beverage intake, weight, weight change, electrolyte/renal panel, glucose/endocrine profile, and gastrointestinal profile  Discharge planning: continue diabetic 2,000 calorie diet  Nutrition Risk/Follow-Up: low (follow-up in 5-7 days)   Please consult if re-assessment needed sooner.

## 2024-05-21 NOTE — PT/OT/SLP PROGRESS
Physical Therapy Treatment Note           Name: Josep Angulo    : 1950 (73 y.o.)  MRN: [id number]           TREATMENT SUMMARY AND RECOMMENDATIONS:    PT Received On: 24  PT Start Time: 900     PT Stop Time: 925  PT Total Time (min): 25 min     Subjective Assessment:  x No complaints  Lethargic    Awake, alert, cooperative  Uncooperative    Agitated  c/o pain    Appropriate  c/o fatigue    Confused x Treated at bedside     Emotionally labile  Treated in gym/dept.    Impulsive  Other:    Flat affect       Therapy Precautions:    Cognitive deficits  Spinal precautions    Collar - hard  Sternal precautions    Collar - soft   TLSO    Fall risk  LSO    Hip precautions - posterior  Knee immobilizer    Hip precautions - anterior  WBAT    Impaired communication  Partial weightbearing    Oxygen  TTWB    PEG tube  NWB   x Visual deficits  Other:    Hearing deficits          Treatment Objectives:     Mobility Training:   Assist level Comments    Bed mobility     Transfer CGA Stand pivot x 6 towards R side   Gait     Sit to stand transitions CGA Sit-stand 10 reps x2 with B UE use   Sitting balance     Standing balance      Wheelchair mobility     Car transfer     Other:          Therapeutic Exercise:   Exercise Sets Reps Comments   B LE exer sitting  1 20 In all planes to promote muscle strength and ROM                          Additional Comments:  No issues noted    Assessment: Patient tolerated session well    PT Plan: continue  Revisions made to plan of care: No    GOALS:   Multidisciplinary Problems       Physical Therapy Goals          Problem: Physical Therapy    Goal Priority Disciplines Outcome Goal Variances Interventions   Physical Therapy Goal     PT, PT/OT Progressing     Description: Goals to be met by: Discharge      Patient will increase functional independence with mobility by performin. Sit to stand transfer with Supervision  2. Bed to chair transfer with Supervision using  Rolling Walker  3. Wheelchair propulsion x 300 feet with Modified [place] using bilateral uppper extremities.   4. Gait x 50 feet at SPV levels using RW with or without prosthesis (currently poor fitting and needs  follow ou)                         Skilled PT Minutes Provided: 25   Communication with Treatment Team:     Equipment recommendations:       At end of treatment, patient remained:   Up in chair     Up in wheelchair in room    In bed    With alarm activated    Bed rails up    Call bell in reach     Family/friends present    Restraints secured properly    In bathroom with CNA/RN notified    Nurse aware   x In gym with therapist/tech    Other:

## 2024-05-21 NOTE — PLAN OF CARE
Problem: Adult Inpatient Plan of Care  Goal: Plan of Care Review  Outcome: Progressing  Flowsheets (Taken 5/21/2024 1201)  Plan of Care Reviewed With: patient  Goal: Patient-Specific Goal (Individualized)  Outcome: Progressing  Flowsheets (Taken 5/21/2024 1201)  Individualized Care Needs: safety with mobility, monitor for s/s of infection, monitor blood glucose levels  Anxieties, Fears or Concerns: none expressed at this time  Goal: Absence of Hospital-Acquired Illness or Injury  Outcome: Progressing  Intervention: Identify and Manage Fall Risk  Flowsheets (Taken 5/21/2024 1201)  Safety Promotion/Fall Prevention:   assistive device/personal item within reach   bed alarm set   nonskid shoes/socks when out of bed   side rails raised x 3  Intervention: Prevent Skin Injury  Flowsheets (Taken 5/21/2024 1201)  Body Position: position changed independently  Skin Protection: incontinence pads utilized  Device Skin Pressure Protection:   absorbent pad utilized/changed   adhesive use limited  Intervention: Prevent and Manage VTE (Venous Thromboembolism) Risk  Flowsheets (Taken 5/21/2024 1201)  VTE Prevention/Management:   ambulation promoted   bleeding precations maintained   bleeding risk assessed   fluids promoted  Intervention: Prevent Infection  Flowsheets (Taken 5/21/2024 1201)  Infection Prevention:   cohorting utilized   environmental surveillance performed   equipment surfaces disinfected   hand hygiene promoted   single patient room provided   rest/sleep promoted  Goal: Optimal Comfort and Wellbeing  Outcome: Progressing  Intervention: Monitor Pain and Promote Comfort  Flowsheets (Taken 5/21/2024 1201)  Pain Management Interventions:   care clustered   pillow support provided   position adjusted   relaxation techniques promoted   quiet environment facilitated  Intervention: Provide Person-Centered Care  Flowsheets (Taken 5/21/2024 1201)  Trust Relationship/Rapport:   care explained   questions encouraged   choices  provided   reassurance provided   emotional support provided   thoughts/feelings acknowledged   empathic listening provided   questions answered  Goal: Readiness for Transition of Care  Outcome: Progressing  Intervention: Mutually Develop Transition Plan  Flowsheets (Taken 5/21/2024 1201)  Communicated CIELO with patient/caregiver: Date not available/Unable to determine     Problem: Diabetes Comorbidity  Goal: Blood Glucose Level Within Targeted Range  Outcome: Progressing  Intervention: Monitor and Manage Glycemia  Flowsheets (Taken 5/21/2024 1201)  Glycemic Management: blood glucose monitored     Problem: Infection  Goal: Absence of Infection Signs and Symptoms  Outcome: Progressing  Intervention: Prevent or Manage Infection  Flowsheets (Taken 5/21/2024 1201)  Fever Reduction/Comfort Measures:   lightweight bedding   lightweight clothing  Infection Management: aseptic technique maintained  Isolation Precautions:   precautions maintained   protective     Problem: Skin Injury Risk Increased  Goal: Skin Health and Integrity  Outcome: Progressing  Intervention: Optimize Skin Protection  Flowsheets (Taken 5/21/2024 1201)  Pressure Reduction Techniques:   frequent weight shift encouraged   weight shift assistance provided   pressure points protected  Pressure Reduction Devices: positioning supports utilized  Skin Protection: incontinence pads utilized  Activity Management: Rolling - L1  Head of Bed (HOB) Positioning: HOB at 30-45 degrees  Intervention: Promote and Optimize Oral Intake  Flowsheets (Taken 5/21/2024 1201)  Oral Nutrition Promotion:   adaptive equipment use encouraged   calorie-dense foods provided   calorie-dense liquids provided     Problem: Fall Injury Risk  Goal: Absence of Fall and Fall-Related Injury  Outcome: Progressing  Intervention: Identify and Manage Contributors  Flowsheets (Taken 5/21/2024 1201)  Self-Care Promotion:   independence encouraged   BADL personal objects within reach   BADL personal  routines maintained   meal set-up provided   adaptive equipment use encouraged  Medication Review/Management:   medications reviewed   high-risk medications identified  Intervention: Promote Injury-Free Environment  Flowsheets (Taken 5/21/2024 1201)  Safety Promotion/Fall Prevention:   assistive device/personal item within reach   bed alarm set   nonskid shoes/socks when out of bed   side rails raised x 3     Problem: Mobility Impairment  Goal: Optimal Mobility  Outcome: Progressing  Intervention: Optimize Mobility  Flowsheets (Taken 5/21/2024 1201)  Assistive Device Utilized:   gait belt   walker  Activity Management: Rolling - L1  Positioning/Transfer Devices: pillows     Problem: Wound  Goal: Optimal Coping  Outcome: Progressing  Intervention: Support Patient and Family Response  Flowsheets (Taken 5/21/2024 1201)  Supportive Measures:   active listening utilized   relaxation techniques promoted   positive reinforcement provided   self-reflection promoted   self-care encouraged  Family/Support System Care: self-care encouraged  Goal: Optimal Functional Ability  Outcome: Progressing  Intervention: Optimize Functional Ability  Flowsheets (Taken 5/21/2024 1201)  Assistive Device Utilized:   gait belt   walker  Activity Management: Rolling - L1  Activity Assistance Provided: assistance, 2 people  Goal: Absence of Infection Signs and Symptoms  Outcome: Progressing  Intervention: Prevent or Manage Infection  Flowsheets (Taken 5/21/2024 1201)  Fever Reduction/Comfort Measures:   lightweight bedding   lightweight clothing  Infection Management: aseptic technique maintained  Isolation Precautions:   precautions maintained   protective  Goal: Improved Oral Intake  Outcome: Progressing  Intervention: Promote and Optimize Oral Intake  Flowsheets (Taken 5/21/2024 1201)  Oral Nutrition Promotion:   adaptive equipment use encouraged   calorie-dense foods provided   calorie-dense liquids provided  Goal: Optimal Pain Control and  Function  Outcome: Progressing  Intervention: Prevent or Manage Pain  Flowsheets (Taken 5/21/2024 1201)  Sleep/Rest Enhancement:   awakenings minimized   consistent schedule promoted   relaxation techniques promoted   regular sleep/rest pattern promoted  Pain Management Interventions:   care clustered   pillow support provided   position adjusted   relaxation techniques promoted   quiet environment facilitated  Goal: Skin Health and Integrity  Outcome: Progressing  Intervention: Optimize Skin Protection  Flowsheets (Taken 5/21/2024 1201)  Pressure Reduction Techniques:   frequent weight shift encouraged   weight shift assistance provided   pressure points protected  Pressure Reduction Devices: positioning supports utilized  Skin Protection: incontinence pads utilized  Activity Management: Rolling - L1  Head of Bed (HOB) Positioning: HOB at 30-45 degrees  Goal: Optimal Wound Healing  Outcome: Progressing  Intervention: Promote Wound Healing  Flowsheets (Taken 5/21/2024 1201)  Sleep/Rest Enhancement:   awakenings minimized   consistent schedule promoted   relaxation techniques promoted   regular sleep/rest pattern promoted

## 2024-05-22 LAB
POCT GLUCOSE: 112 MG/DL (ref 70–110)
POCT GLUCOSE: 142 MG/DL (ref 70–110)
POCT GLUCOSE: 163 MG/DL (ref 70–110)
POCT GLUCOSE: 231 MG/DL (ref 70–110)

## 2024-05-22 PROCEDURE — 97535 SELF CARE MNGMENT TRAINING: CPT

## 2024-05-22 PROCEDURE — 97110 THERAPEUTIC EXERCISES: CPT

## 2024-05-22 PROCEDURE — 63600175 PHARM REV CODE 636 W HCPCS: Performed by: PHYSICIAN ASSISTANT

## 2024-05-22 PROCEDURE — 99900035 HC TECH TIME PER 15 MIN (STAT)

## 2024-05-22 PROCEDURE — 97530 THERAPEUTIC ACTIVITIES: CPT

## 2024-05-22 PROCEDURE — 94760 N-INVAS EAR/PLS OXIMETRY 1: CPT

## 2024-05-22 PROCEDURE — 97535 SELF CARE MNGMENT TRAINING: CPT | Mod: CQ

## 2024-05-22 PROCEDURE — 11000004 HC SNF PRIVATE

## 2024-05-22 PROCEDURE — 25000003 PHARM REV CODE 250: Performed by: PHYSICIAN ASSISTANT

## 2024-05-22 PROCEDURE — 25000242 PHARM REV CODE 250 ALT 637 W/ HCPCS: Performed by: INTERNAL MEDICINE

## 2024-05-22 RX ORDER — INSULIN ASPART 100 [IU]/ML
0-10 INJECTION, SOLUTION INTRAVENOUS; SUBCUTANEOUS
Status: DISCONTINUED | OUTPATIENT
Start: 2024-05-22 | End: 2024-06-07 | Stop reason: HOSPADM

## 2024-05-22 RX ORDER — LOPERAMIDE HYDROCHLORIDE 2 MG/1
2 CAPSULE ORAL
Status: DISCONTINUED | OUTPATIENT
Start: 2024-05-22 | End: 2024-06-07 | Stop reason: HOSPADM

## 2024-05-22 RX ADMIN — ASPIRIN 81 MG: 81 TABLET, COATED ORAL at 10:05

## 2024-05-22 RX ADMIN — METFORMIN HYDROCHLORIDE 1000 MG: 500 TABLET ORAL at 08:05

## 2024-05-22 RX ADMIN — LOPERAMIDE HYDROCHLORIDE 2 MG: 2 CAPSULE ORAL at 08:05

## 2024-05-22 RX ADMIN — METFORMIN HYDROCHLORIDE 1000 MG: 500 TABLET ORAL at 10:05

## 2024-05-22 RX ADMIN — FERROUS SULFATE TAB 325 MG (65 MG ELEMENTAL FE) 1 EACH: 325 (65 FE) TAB at 10:05

## 2024-05-22 RX ADMIN — ENOXAPARIN SODIUM 40 MG: 40 INJECTION SUBCUTANEOUS at 04:05

## 2024-05-22 RX ADMIN — CITALOPRAM HYDROBROMIDE 10 MG: 10 TABLET ORAL at 10:05

## 2024-05-22 RX ADMIN — EMPAGLIFLOZIN 25 MG: 25 TABLET, FILM COATED ORAL at 04:05

## 2024-05-22 RX ADMIN — TAMSULOSIN HYDROCHLORIDE 0.4 MG: 0.4 CAPSULE ORAL at 10:05

## 2024-05-22 RX ADMIN — MIRTAZAPINE 15 MG: 7.5 TABLET, FILM COATED ORAL at 08:05

## 2024-05-22 RX ADMIN — LOPERAMIDE HYDROCHLORIDE 2 MG: 2 CAPSULE ORAL at 04:05

## 2024-05-22 RX ADMIN — LISINOPRIL 20 MG: 20 TABLET ORAL at 10:05

## 2024-05-22 RX ADMIN — INSULIN ASPART 4 UNITS: 100 INJECTION, SOLUTION INTRAVENOUS; SUBCUTANEOUS at 12:05

## 2024-05-22 NOTE — PATIENT CARE CONFERENCE
Name: Josep Angulo    : 1950 (73 y.o.)  MRN: [id number]            Interdisciplinary Team Conference     Case conference held with patient/family and care team to discuss progress, plan of care, barriers to be addressed for safe return home, equipment recommendations, and discharge planning. Communicated therapy progress with MD, RN, therapy clinicians and case management. All questions/concerns answered.

## 2024-05-22 NOTE — PROGRESS NOTES
[facility]  [facility] [place] MEDICINE ~ PROGRESS NOTE    CHIEF COMPLAINT     Hospital follow up    HOSPITAL COURSE     73 year old male with a past medical history of HTN, HLD, DM2, iron deficiency, bipolar disorder, insomnia, dementia, and 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 Daughter reports patient was unable to follow commands and falling asleep easily. Upon arrival to the ER, vitals revealed elevated temperature reading and mildly elevated BP. Labs were significant for leukocytosis, hypoglycemia with a glucose of 28, elevated CRP. Patient was given a D10 infusion with improvement of glucose to 126. CXR revealed prominent interstitial markings with no focal opacification, no acute cardiopulmonary process appreciated. CT head revealed no acute intracranial abnormality identified with chronic microvascular ischemic disease. Daughter at bedside brought patient's home medication bottles. Patient had 4 bottles of Metformin 1000mg BID which daughter reports patient only takes once daily and 3 bottles of Glimepiride 2mg BID. Daughter reports she gets help from a nurse who comes to her home to organize the patient's medication therefore she does not believe he could have taken too much medication. On exam, CBG registered as <20. He did receive a one time dose of Octreotide 50mcg and D5NS at 100cc/hr. Patient is being admitted to hospital medicine service.      His glucose improved after the dose of octreotide.  D5 NS was able to be discontinued.  He is now on metformin a 1000 mg b.i.d. and tolerating well.  Blood pressure was noted to be elevated and home lisinopril has been increased to 20 mg daily starting today.  Patient is being transitioned to our swing unit for continued therapy while awaiting placement at nursing home.    Today:  Patient without complaints today. Glucose trending higher, will plan on  resuming Jardiance 25mg PO QD.     OBJECTIVE/PHYSICAL EXAM     VITAL SIGNS (MOST RECENT):  Temp: 97.6 °F (36.4 °C) (05/22/24 0721)  Pulse: 83 (05/22/24 0721)  Resp: 18 (05/21/24 2010)  BP: (!) 154/84 (05/22/24 0721)  SpO2: 96 % (05/22/24 0721) VITAL SIGNS (24 HOUR RANGE):  Temp:  [97.2 °F (36.2 °C)-97.6 °F (36.4 °C)] 97.6 °F (36.4 °C)  Pulse:  [83-87] 83  Resp:  [18] 18  SpO2:  [96 %-99 %] 96 %  BP: (150-154)/(77-84) 154/84     GENERAL: In no acute distress, afebrile  HEENT: Atraumatic, normocephalic, moist mucous membranes   CHEST: Clear to auscultation bilaterally  HEART: S1, S2, no appreciable murmur  ABDOMEN: Soft, nontender, BS +  MSK: Warm, no lower extremity edema, left BKA   NEUROLOGIC: Alert and oriented x4, moving all extremities with good strength   INTEGUMENTARY: See media for right great toe   PSYCHIATRY: Appropriate mood and affect     ASSESSMENT/PLAN     Sulfonylurea induced hypoglycemia   Uncontrolled DM2   Hold home Glimepiride   Received D10 in ER   Received Octreotide 50mcg  [facility] CBGs   Hypoglycemic protocol  A1c 6%   Continue Metformin to 1000mg BID   Resume home Jardiance 25mg QD      Acute metabolic encephalopathy - resolved   2/2 hypoglycemia and receiving sedating medications in the ER   See above treatment  Hold sedating medications   CT head revealed no acute intracranial abnormality identified with chronic microvascular ischemic disease     Leukocytosis - resolved   UA without UTI   CXR without acute findings     Covid, flu, RSV negative      HTN  Increase Lisinopril to 20mg on 05/21/2024     BPH  Continue home Flomax     Iron deficiency   Continue home iron supplementation     Bipolar disorder, insomnia, dementia  Continue home Mirtazapine when patient appropriate   Daughter requesting [facility] at [facility] -  consulted      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 **             MEDICATIONS   aspirin  81 mg Oral Daily    citalopram  10 mg Oral Daily    enoxparin  40 mg Subcutaneous Daily    ferrous sulfate  1 tablet Oral Daily    lisinopriL  20 mg Oral Daily    metFORMIN  1,000 mg Oral BID    mirtazapine  15 mg Oral QHS    tamsulosin  0.4 mg Oral Daily         INFUSIONS       DIAGNOSTIC TESTS  No orders to display      No results found for: "EF"     NUTRITION STATUS  Patient meets ASPEN criteria for   malnutrition of   per RD assessment as evidenced by:                       A minimum of two characteristics is recommended for diagnosis of either severe or non-severe malnutrition.     Case related differential diagnoses have been reviewed; assessment and plan has been documented. I have personally reviewed the labs and test results that are currently available; I have reviewed the patients medication list. I have reviewed the consulting providers recommendations. I have reviewed or attempted to review medical records based upon their availability.  All of the patient's and/or family's questions have been addressed and answered to the best of my ability.  I will continue to monitor closely and make adjustments to medical management as needed.  This document was created using M*Modal Fluency Direct.  Transcription errors may have been made.  Please contact me if any questions may rise regarding documentation to clarify transcription.      ALICE Hilton   Internal Medicine  Department of Hospital Medicine [facility] - " Medical Surgical Unit

## 2024-05-22 NOTE — PLAN OF CARE
Problem: Adult Inpatient Plan of Care  Goal: Plan of Care Review  Outcome: Progressing  Flowsheets (Taken 5/21/2024 1929)  Plan of Care Reviewed With: patient  Goal: Patient-Specific Goal (Individualized)  Outcome: Progressing  Flowsheets (Taken 5/21/2024 1929)  Individualized Care Needs: Safety with transfers to commode, Monitor CBG, Monitor loose stools, Toe consult johnna  Anxieties, Fears or Concerns: Wants meds early at 8:30 tonight  Goal: Absence of Hospital-Acquired Illness or Injury  Outcome: Progressing  Intervention: Identify and Manage Fall Risk  Flowsheets (Taken 5/21/2024 1929)  Safety Promotion/Fall Prevention:   assistive device/personal item within reach   bed alarm set   muscle strengthening facilitated   gait belt with ambulation   lighting adjusted   medications reviewed   room near unit station  Intervention: Prevent Skin Injury  Flowsheets (Taken 5/21/2024 1929)  Body Position: sitting up in bed  Skin Protection:   incontinence pads utilized   skin sealant/moisture barrier applied  Device Skin Pressure Protection:   absorbent pad utilized/changed   positioning supports utilized   tubing/devices free from skin contact  Intervention: Prevent and Manage VTE (Venous Thromboembolism) Risk  Flowsheets (Taken 5/21/2024 1929)  VTE Prevention/Management:   bleeding risk assessed   bleeding precations maintained   ROM (active) performed   fluids promoted  Intervention: Prevent Infection  Flowsheets (Taken 5/21/2024 1929)  Infection Prevention:   cohorting utilized   hand hygiene promoted   single patient room provided  Goal: Optimal Comfort and Wellbeing  Outcome: Progressing  Intervention: Monitor Pain and Promote Comfort  Flowsheets (Taken 5/21/2024 1929)  Pain Management Interventions:   diversional activity provided   pillow support provided   position adjusted   quiet environment facilitated  Intervention: Provide Person-Centered Care  Flowsheets (Taken 5/21/2024 1929)  Trust Relationship/Rapport:    care explained   choices provided   questions answered   thoughts/feelings acknowledged     Problem: Diabetes Comorbidity  Goal: Blood Glucose Level Within Targeted Range  Outcome: Progressing  Intervention: Monitor and Manage Glycemia  Flowsheets (Taken 5/21/2024 1929)  Glycemic Management: blood glucose monitored     Problem: Fall Injury Risk  Goal: Absence of Fall and Fall-Related Injury  Outcome: Progressing  Intervention: Identify and Manage Contributors  Flowsheets (Taken 5/21/2024 1929)  Self-Care Promotion:   BADL personal objects within reach   BADL personal routines maintained  Medication Review/Management: medications reviewed  Intervention: Promote Injury-Free Environment  Flowsheets (Taken 5/21/2024 1929)  Safety Promotion/Fall Prevention:   assistive device/personal item within reach   bed alarm set   muscle strengthening facilitated   gait belt with ambulation   lighting adjusted   medications reviewed   room near unit station     Problem: Wound  Goal: Optimal Coping  Outcome: Progressing  Intervention: Support Patient and Family Response  Flowsheets (Taken 5/21/2024 1929)  Supportive Measures:   active listening utilized   self-care encouraged   goal-setting facilitated  Family/Support System Care: support provided  Goal: Optimal Functional Ability  Outcome: Progressing  Intervention: Optimize Functional Ability  Flowsheets (Taken 5/21/2024 1929)  Assistive Device Utilized:   gait belt   walker  Activity Management: Rolling - L1  Activity Assistance Provided: assistance, 2 people  Goal: Absence of Infection Signs and Symptoms  Outcome: Progressing  Intervention: Prevent or Manage Infection  Flowsheets (Taken 5/21/2024 1929)  Fever Reduction/Comfort Measures:   lightweight bedding   lightweight clothing  Infection Management: aseptic technique maintained  Isolation Precautions:   protective   precautions maintained  Goal: Improved Oral Intake  Outcome: Progressing  Intervention: Promote and Optimize  Oral Intake  Flowsheets (Taken 5/21/2024 1929)  Oral Nutrition Promotion:   adaptive equipment use encouraged   calorie-dense liquids provided   calorie-dense foods provided   rest periods promoted   social interaction promoted  Goal: Optimal Pain Control and Function  Outcome: Progressing  Intervention: Prevent or Manage Pain  Flowsheets (Taken 5/21/2024 1929)  Sleep/Rest Enhancement:   consistent schedule promoted   regular sleep/rest pattern promoted   relaxation techniques promoted  Pain Management Interventions:   diversional activity provided   pillow support provided   position adjusted   quiet environment facilitated  Goal: Skin Health and Integrity  Outcome: Progressing  Intervention: Optimize Skin Protection  Flowsheets (Taken 5/21/2024 1929)  Pressure Reduction Techniques:   frequent weight shift encouraged   rest period provided between sit times   weight shift assistance provided  Pressure Reduction Devices: positioning supports utilized  Skin Protection:   incontinence pads utilized   skin sealant/moisture barrier applied  Activity Management: Rolling - L1  Head of Bed (HOB) Positioning: HOB at 30-45 degrees  Goal: Optimal Wound Healing  Outcome: Progressing  Intervention: Promote Wound Healing  Flowsheets (Taken 5/21/2024 1929)  Sleep/Rest Enhancement:   consistent schedule promoted   regular sleep/rest pattern promoted   relaxation techniques promoted     Problem: Infection  Goal: Absence of Infection Signs and Symptoms  Outcome: Progressing  Intervention: Prevent or Manage Infection  Flowsheets (Taken 5/21/2024 1929)  Fever Reduction/Comfort Measures:   lightweight bedding   lightweight clothing  Infection Management: aseptic technique maintained  Isolation Precautions:   protective   precautions maintained

## 2024-05-22 NOTE — PLAN OF CARE
Weekly Staffing Report      Date Admitted: 5/21/2024 :   Staffing Date: 5/22/2024     Patient Active Problem List   Diagnosis    Hypoglycemia associated with diabetes          Team Members Present:       Nursing Present     Yes [x]    No []    Physical Therapy Present    Yes [x]    No []    Occupational Therapy Present     Yes [x]    No []    Speech Therapy Present    Yes []    No []    NA [x]    Dietary Present    Yes [x]    No []        Physician Present     [] Thairy Reyes    [x] Vani Agosto    [x] ALICE Morley    []       Family Present    [x] Adult Children Daughter on phone    [] Spouse    [] POA    [] Friend/ Caregiver    [] Other       Interdisciplinary Meeting Notes:  Discussed NH referrals. Awaiting decision. PT- walking 25ft with RW. Did not put prosthesis due to not fitting properly. Bed mobility/transfers Cga. OT- SBA/CGA for all activities. Appetite- good. Medically- adjusted medication, added lisinopril. Due to elevated BP. Blood sugars better controlled. Continue to work on NH placement. NRD 05/30. All questions answered at this time                Please see Documented PT/OT/ST, Dietary, Nursing, and Physician notes for detailed treatment information.

## 2024-05-22 NOTE — PT/OT/SLP PROGRESS
Occupational Therapy  Treatment    Name: Josep Angulo    : 1950 (73 y.o.)  MRN: [id number]           TREATMENT SUMMARY AND RECOMMENDATIONS:      OT Date of Treatment: 24  OT Start Time: 857  OT Stop Time: 907  OT Total Time (min): 10 min      Subjective Assessment:   No complaints  Lethargic   x Awake, alert, cooperative  Impulsive    Uncooperative   Flat affect    Agitated  c/o pain    Appropriate  c/o fatigue    Confused  Treated at bedside     Emotionally labile x Treated in gym/dept.      Other:        Therapy Precautions:    Cognitive deficits  Spinal precautions    Collar - hard  Sternal precautions    Collar - soft   TLSO   x Fall risk  LSO    Hip precautions - posterior  Knee immobilizer    Hip precautions - anterior  WBAT    Impaired communication  Partial weightbearing    Oxygen  TTWB    PEG tube  NWB    Visual deficits      Hearing deficits   Other:        Treatment Objectives:     Mobility Training:    Mobility task Assist level Comments    Bed mobility     Transfer SBA/CGA Stand/pivot t/f with RW to B[place]air   Sit to stands transitions     Functional mobility SBA/CGA X10 feet with RW in room; hopping on RLE   Sitting balance     Standing balance      Other:          Therapeutic Exercise:   Exercise Sets Reps Comments   2# dowel 3 10 Shoulder press, chest press, straight arm raises, bicep curls, forward rows, backwards rows                         Additional Comments:  Pt had to return to room for staffing meeting; session cut short this AM.    Assessment: Patient tolerated session well.    GOALS:   Multidisciplinary Problems       Occupational Therapy Goals          Problem: Occupational Therapy    Goal Priority Disciplines Outcome Interventions   Occupational Therapy Goal     OT, PT/OT Progressing    Description: Goals to be met by: 24     Patient will increase functional independence with ADLs by performing:    LE Dressing with Contact Guard Assistance.  Grooming while standing at  sink with Modified [place].  Toileting from toilet with Contact Guard Assistance for hygiene and clothing management.   Bathing from  shower chair/bench with Contact Guard Assistance.  Toilet transfer to toilet with Stand-by Assistance.                         Recommendations:     Discharge Equipment Recommendations:  none     Plan:     Patient to be seen 6 x/week to address the above listed problems via self-care/home management, therapeutic activities, therapeutic exercises, wheelchair management/training  Plan of Care Expires: 06/04/24  Plan of Care Reviewed with: patient  Revisions made to plan of care: No      Skilled OT Minutes Provided: 10  Communication with Treatment Team:     Equipment recommendations:       At end of treatment, patient remained:  x Up in chair     Up in wheelchair in room    In bed   x With alarm activated    Bed rails up   x Call bell in reach     Family/friends present    Restraints secured properly    In bathroom with CNA/RN notified    In gym with PT/PTA/tech    Nurse aware    Other:

## 2024-05-22 NOTE — PLAN OF CARE
Problem: Adult Inpatient Plan of Care  Goal: Plan of Care Review  Outcome: Progressing  Flowsheets (Taken 5/22/2024 0800)  Plan of Care Reviewed With: patient  Goal: Patient-Specific Goal (Individualized)  Outcome: Progressing  Flowsheets (Taken 5/22/2024 0800)  Individualized Care Needs: Safety with transfers to commode, monitor CBGs, monitor loose stools  Anxieties, Fears or Concerns: None expressed at this time  Patient/Family-Specific Goals (Include Timeframe): Return to baseline  Goal: Absence of Hospital-Acquired Illness or Injury  Outcome: Progressing  Goal: Optimal Comfort and Wellbeing  Outcome: Progressing  Intervention: Monitor Pain and Promote Comfort  Flowsheets (Taken 5/22/2024 0800)  Pain Management Interventions:   care clustered   medication offered   quiet environment facilitated  Intervention: Provide Person-Centered Care  Flowsheets (Taken 5/22/2024 0800)  Trust Relationship/Rapport:   care explained   choices provided   emotional support provided   empathic listening provided   questions answered   questions encouraged   reassurance provided   thoughts/feelings acknowledged  Goal: Readiness for Transition of Care  Outcome: Progressing     Problem: Diabetes Comorbidity  Goal: Blood Glucose Level Within Targeted Range  Outcome: Progressing     Problem: Fall Injury Risk  Goal: Absence of Fall and Fall-Related Injury  Outcome: Progressing     Problem: Wound  Goal: Optimal Coping  Outcome: Progressing  Goal: Optimal Functional Ability  Outcome: Progressing  Goal: Absence of Infection Signs and Symptoms  Outcome: Progressing  Goal: Improved Oral Intake  Outcome: Progressing  Goal: Optimal Pain Control and Function  Outcome: Progressing  Goal: Skin Health and Integrity  Outcome: Progressing  Goal: Optimal Wound Healing  Outcome: Progressing     Problem: Infection  Goal: Absence of Infection Signs and Symptoms  Outcome: Progressing

## 2024-05-22 NOTE — PLAN OF CARE
[facility]  Initial Discharge Assessment       Primary Care Provider: Sami Rothman MD    Admission Diagnosis: Hypoglycemia associated with diabetes [E11.649]    Admission Date: 5/21/2024  Expected Discharge Date:     Transition of Care Barriers: None    Payor: [employer] / Plan: [employer] / Product Type: Medicare Advantage /     Extended Emergency Contact Information  Primary Emergency Contact: nellie mosqueda  Mobile Phone: [phone]  Relation: Daughter  Preferred language: English   needed? No    Discharge Plan A: New Nursing Home placement - [facility] care facility       No Pharmacies Listed    Initial Assessment (most recent)       Adult Discharge Assessment - 05/22/24 1355          Discharge Assessment    Assessment Type Discharge Planning Assessment     Confirmed/corrected address, phone number and insurance Yes     Confirmed Demographics Correct on Facesheet     Source of Information patient     If unable to respond/provide information was family/caregiver contacted? Yes     Contact Name/Number Nellie Mosqueda daughter [phone]     Communicated CIELO with patient/caregiver Date not available/Unable to determine     Reason For Admission DM     People in Home alone     Facility Arrived From: home     Do you expect to return to your current living situation? No     Do you have help at home or someone to help you manage your care at home? Yes     Who are your caregiver(s) and their phone number(s)? Nellie Mosqueda daughter [phone]     Prior to hospitilization cognitive status: Alert/Oriented     Current cognitive status: Alert/Oriented     Walking or Climbing Stairs Difficulty yes     Walking or Climbing Stairs transferring difficulty, dependent     Dressing/Bathing Difficulty yes     Dressing/Bathing bathing difficulty, assistance 1 person     Home Accessibility wheelchair accessible     Home Layout Able to live on 1st  floor     Equipment Currently Used at Home bedside commode;walker, rolling     Readmission within 30 days? No     Patient currently being followed by outpatient case management? No     Do you currently have service(s) that help you manage your care at home? No     Do you take prescription medications? Yes     Do you have prescription coverage? Yes     [facility]     Do you have any problems affording any of your prescribed medications? TBD     Is the patient taking medications as prescribed? yes     Who is going to help you get home at discharge? Nellie Mosqueda daughter [phone]     How do you get to doctors appointments? family or friend will provide     Are you on dialysis? No     Do you take coumadin? No     Discharge Plan A New Nursing Home placement - [facility] care facility     DME Needed Upon Discharge  none     Discharge Plan discussed with: Adult children     Transition of Care Barriers None        Physical Activity    On average, how many days per week do you engage in moderate to strenuous exercise (like a brisk walk)? 0 days     On average, how many minutes do you engage in exercise at this level? 0 min        Financial Resource Strain    How hard is it for you to pay for the very basics like food, housing, medical care, and heating? Not hard at all        Housing Stability    In the last 12 months, was there a time when you were not able to pay the mortgage or rent on time? No     At any time in the past 12 months, were you homeless or living in a shelter (including now)? No        Transportation Needs    Has the lack of transportation kept you from medical appointments, meetings, work or from getting things needed for daily living? No        Food Insecurity    Within the past 12 months, you worried that your food would run out before you got the money to buy more. Never true     Within the past 12 months, the food you bought just didn't last and you didn't have money to get more. Never  true        Stress    Do you feel stress - tense, restless, nervous, or anxious, or unable to sleep at night because your mind is troubled all the time - these days? To some extent        Alcohol Use    Q1: How often do you have a drink containing alcohol? Never     Q2: How many drinks containing alcohol do you have on a typical day when you are drinking? Patient does not drink     Q3: How often do you have six or more drinks on one occasion? Never        Utilities    In the past 12 months has the electric, gas, oil, or water company threatened to shut off services in your home? No        Health Literacy    How often do you need to have someone help you when you read instructions, pamphlets, or other written material from your doctor or pharmacy? Rarely

## 2024-05-22 NOTE — PT/OT/SLP PROGRESS
Physical Therapy Treatment Note           Name: Josep Angulo    : 1950 (73 y.o.)  MRN: [id number]           TREATMENT SUMMARY AND RECOMMENDATIONS:    PT Received On: 24  PT Start Time: 1300     PT Stop Time: 1319  PT Total Time (min): 19 min     Subjective Assessment:   No complaints  Lethargic   x Awake, alert, cooperative  Uncooperative    Agitated  c/o pain    Appropriate  c/o fatigue    Confused  Treated at bedside     Emotionally labile x Treated in gym/dept.    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  Ran into wall x 1   Car transfer     Other:          Therapeutic Exercise:   Exercise Sets Reps Comments                               Additional Comments:  No issues noted, overall improving I and strength demonstrated.     Assessment: Patient tolerated session well. Awaiting NH placement     PT Plan: continue  Revisions made to plan of care: No    GOALS:   Multidisciplinary Problems       Physical Therapy Goals          Problem: Physical Therapy    Goal Priority Disciplines Outcome Goal Variances Interventions   Physical Therapy Goal     PT,  PT/OT Progressing     Description: Description: Goals to be met by: Discharge       Patient will increase functional independence with mobility by performin. Sit to stand transfer with Supervision  2. Bed to chair transfer with Supervision using Rolling Walker  3. Wheelchair propulsion x 300 feet with Modified [place] using bilateral uppper extremities.   4. Gait x 50 feet at SPV levels using RW with or without prosthesis (currently poor fitting and needs  follow op)                         Skilled PT Minutes Provided: 15   Communication with Treatment Team:     Equipment recommendations:       At end of treatment, patient remained:  x Up in chair     Up in wheelchair in room    In bed   x With alarm activated    Bed rails up   x Call bell in reach     Family/friends present    Restraints secured properly    In bathroom with CNA/RN notified    Nurse aware    In gym with therapist/tech    Other:

## 2024-05-22 NOTE — PATIENT CARE CONFERENCE
Name: Josep Angulo    : 1950 (73 y.o.)  MRN: [id number]            Interdisciplinary Team Conference     Case conference held with patient/family and care team to discuss progress, plan of care, barriers to be addressed for safe return home, equipment recommendations, and discharge planning. Communicated therapy progress with MD, RN, therapy clinicians and case management. All questions/concerns answered.

## 2024-05-22 NOTE — PT/OT/SLP PROGRESS
Physical Therapy Treatment Note           Name: Josep Angulo    : 1950 (73 y.o.)  MRN: [id number]           TREATMENT SUMMARY AND RECOMMENDATIONS:    PT Received On: 24  PT Start Time: 830     PT Stop Time: 857  PT Total Time (min): 27 min     Subjective Assessment:   No complaints  Lethargic   x Awake, alert, cooperative  Uncooperative    Agitated  c/o pain    Appropriate  c/o fatigue    Confused  Treated at bedside     Emotionally labile x Treated in gym/dept.    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  Ran into wall x 1   Car transfer     Other:          Therapeutic Exercise:   Exercise Sets Reps Comments   B LE exer seated 1 30 In all planes to promote muscle strength and ROM; 2# ankle wt on B LE                         Additional Comments:  No issues noted, overall improving I and strength demonstrated.     Assessment: Patient tolerated session well    PT Plan: continue  Revisions made to plan of care: No    GOALS:   Multidisciplinary Problems       Physical Therapy Goals          Problem: Physical Therapy    Goal Priority Disciplines Outcome  Goal Variances Interventions   Physical Therapy Goal     PT, PT/OT Progressing     Description: Description: Goals to be met by: Discharge       Patient will increase functional independence with mobility by performin. Sit to stand transfer with Supervision  2. Bed to chair transfer with Supervision using Rolling Walker  3. Wheelchair propulsion x 300 feet with Modified [place] using bilateral uppper extremities.   4. Gait x 50 feet at SPV levels using RW with or without prosthesis (currently poor fitting and needs  follow op)                         Skilled PT Minutes Provided: 25   Communication with Treatment Team:     Equipment recommendations:       At end of treatment, patient remained:  x Up in chair     Up in wheelchair in room    In bed    With alarm activated    Bed rails up    Call bell in reach     Family/friends present    Restraints secured properly    In bathroom with CNA/RN notified    Nurse aware   x In gym with therapist/tech    Other:

## 2024-05-23 LAB
GLUCOSE SERPL-MCNC: 232 MG/DL (ref 70–110)
POCT GLUCOSE: 101 MG/DL (ref 70–110)
POCT GLUCOSE: 118 MG/DL (ref 70–110)
POCT GLUCOSE: 218 MG/DL (ref 70–110)
POCT GLUCOSE: 232 MG/DL (ref 70–110)

## 2024-05-23 PROCEDURE — 97116 GAIT TRAINING THERAPY: CPT | Mod: CQ

## 2024-05-23 PROCEDURE — 97110 THERAPEUTIC EXERCISES: CPT

## 2024-05-23 PROCEDURE — 11000004 HC SNF PRIVATE

## 2024-05-23 PROCEDURE — 25000242 PHARM REV CODE 250 ALT 637 W/ HCPCS: Performed by: INTERNAL MEDICINE

## 2024-05-23 PROCEDURE — 25000003 PHARM REV CODE 250: Performed by: PHYSICIAN ASSISTANT

## 2024-05-23 PROCEDURE — 97530 THERAPEUTIC ACTIVITIES: CPT

## 2024-05-23 PROCEDURE — 94760 N-INVAS EAR/PLS OXIMETRY 1: CPT

## 2024-05-23 PROCEDURE — 99900035 HC TECH TIME PER 15 MIN (STAT)

## 2024-05-23 PROCEDURE — 97110 THERAPEUTIC EXERCISES: CPT | Mod: CQ

## 2024-05-23 PROCEDURE — 63600175 PHARM REV CODE 636 W HCPCS: Performed by: PHYSICIAN ASSISTANT

## 2024-05-23 RX ADMIN — TAMSULOSIN HYDROCHLORIDE 0.4 MG: 0.4 CAPSULE ORAL at 10:05

## 2024-05-23 RX ADMIN — METFORMIN HYDROCHLORIDE 1000 MG: 500 TABLET ORAL at 08:05

## 2024-05-23 RX ADMIN — MIRTAZAPINE 15 MG: 7.5 TABLET, FILM COATED ORAL at 08:05

## 2024-05-23 RX ADMIN — FERROUS SULFATE TAB 325 MG (65 MG ELEMENTAL FE) 1 EACH: 325 (65 FE) TAB at 10:05

## 2024-05-23 RX ADMIN — METFORMIN HYDROCHLORIDE 1000 MG: 500 TABLET ORAL at 10:05

## 2024-05-23 RX ADMIN — INSULIN ASPART 4 UNITS: 100 INJECTION, SOLUTION INTRAVENOUS; SUBCUTANEOUS at 12:05

## 2024-05-23 RX ADMIN — ASPIRIN 81 MG: 81 TABLET, COATED ORAL at 10:05

## 2024-05-23 RX ADMIN — ENOXAPARIN SODIUM 40 MG: 40 INJECTION SUBCUTANEOUS at 04:05

## 2024-05-23 RX ADMIN — LISINOPRIL 20 MG: 20 TABLET ORAL at 10:05

## 2024-05-23 RX ADMIN — CITALOPRAM HYDROBROMIDE 10 MG: 10 TABLET ORAL at 10:05

## 2024-05-23 RX ADMIN — INSULIN ASPART 2 UNITS: 100 INJECTION, SOLUTION INTRAVENOUS; SUBCUTANEOUS at 08:05

## 2024-05-23 RX ADMIN — EMPAGLIFLOZIN 25 MG: 25 TABLET, FILM COATED ORAL at 10:05

## 2024-05-23 NOTE — PLAN OF CARE
Problem: Adult Inpatient Plan of Care  Goal: Plan of Care Review  Outcome: Progressing  Flowsheets (Taken 5/23/2024 0800)  Plan of Care Reviewed With: patient  Goal: Patient-Specific Goal (Individualized)  Outcome: Progressing  Flowsheets (Taken 5/23/2024 0800)  Individualized Care Needs: Safety with transfers to commode, monitor blood sugar, monitor loose stools  Anxieties, Fears or Concerns: None verbalized  Patient/Family-Specific Goals (Include Timeframe): Return to baseline  Goal: Absence of Hospital-Acquired Illness or Injury  Outcome: Progressing  Intervention: Prevent Skin Injury  Flowsheets (Taken 5/23/2024 0800)  Skin Protection:   incontinence pads utilized   transparent dressing maintained  Device Skin Pressure Protection:   adhesive use limited   tubing/devices free from skin contact  Goal: Optimal Comfort and Wellbeing  Outcome: Progressing  Intervention: Monitor Pain and Promote Comfort  Flowsheets (Taken 5/23/2024 0800)  Pain Management Interventions:   care clustered   medication offered   position adjusted   pillow support provided   quiet environment facilitated  Goal: Readiness for Transition of Care  Outcome: Progressing     Problem: Diabetes Comorbidity  Goal: Blood Glucose Level Within Targeted Range  Outcome: Progressing     Problem: Fall Injury Risk  Goal: Absence of Fall and Fall-Related Injury  Outcome: Progressing     Problem: Wound  Goal: Optimal Coping  Outcome: Progressing  Goal: Optimal Functional Ability  Outcome: Progressing  Goal: Absence of Infection Signs and Symptoms  Outcome: Progressing  Goal: Improved Oral Intake  Outcome: Progressing  Goal: Optimal Pain Control and Function  Outcome: Progressing  Goal: Skin Health and Integrity  Outcome: Progressing  Goal: Optimal Wound Healing  Outcome: Progressing     Problem: Infection  Goal: Absence of Infection Signs and Symptoms  Outcome: Progressing

## 2024-05-23 NOTE — PROGRESS NOTES
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 Daughter reports patient was unable to follow commands and falling asleep easily. Upon arrival to the ER, vitals revealed elevated temperature reading and mildly elevated BP. Labs were significant for leukocytosis, hypoglycemia with a glucose of 28, elevated CRP. Patient was given a D10 infusion with improvement of glucose to 126. CXR revealed prominent interstitial markings with no focal opacification, no acute cardiopulmonary process appreciated. CT head revealed no acute intracranial abnormality identified with chronic microvascular ischemic disease. Daughter at bedside brought patient's home medication bottles. Patient had 4 bottles of Metformin 1000mg BID which daughter reports patient only takes once daily and 3 bottles of Glimepiride 2mg BID. Daughter reports she gets help from a nurse who comes to her home to organize the patient's medication therefore she does not believe he could have taken too much medication. On exam, CBG registered as <20. He did receive a one time dose of Octreotide 50mcg and D5NS at 100cc/hr. Patient is being admitted to hospital medicine service.      His glucose improved after the dose of octreotide.  D5 NS was able to be discontinued.  He is now on metformin a 1000 mg b.i.d. and tolerating well.  Blood pressure was noted to be elevated and home lisinopril has been increased to 20 mg daily starting today.  Patient is being transitioned to our swing unit for continued therapy while awaiting placement at nursing home.    Today:  Patient without complaints today. Home Jardiance 25mg daily resumed  yesterday. Remains awaiting placement.     OBJECTIVE/PHYSICAL EXAM     VITAL SIGNS (MOST RECENT):  Temp: 97.6 °F (36.4 °C) (05/23/24 0806)  Pulse: 95 (05/23/24 1142)  Resp: 18 (05/23/24 1142)  BP: 129/73 (05/23/24 0806)  SpO2: 96 % (05/23/24 1142) VITAL SIGNS (24 HOUR RANGE):  Temp:  [97.6 °F (36.4 °C)-97.7 °F (36.5 °C)] 97.6 °F (36.4 °C)  Pulse:  [82-95] 95  Resp:  [18] 18  SpO2:  [96 %-98 %] 96 %  BP: (129-151)/(73-81) 129/73     GENERAL: In no acute distress, afebrile  HEENT: Atraumatic, normocephalic, moist mucous membranes   CHEST: Clear to auscultation bilaterally  HEART: S1, S2, no appreciable murmur  ABDOMEN: Soft, nontender, BS +  MSK: Warm, no lower extremity edema, left BKA   NEUROLOGIC: Alert and oriented x4, moving all extremities with good strength   INTEGUMENTARY: See media for right great toe   PSYCHIATRY: Appropriate mood and affect     ASSESSMENT/PLAN     Sulfonylurea induced hypoglycemia   Uncontrolled DM2   Hold home Glimepiride   Received D10 in ER   Received Octreotide 50mcg  ACHS CBGs, hypoglycemic protocol  A1c 6%   Continue Metformin to 1000mg BID and Jardiance 25mg QD      Acute metabolic encephalopathy - resolved   2/2 hypoglycemia and receiving sedating medications in the ER   Limit sedating medications   CT head revealed no acute intracranial abnormality identified with chronic microvascular ischemic disease     Leukocytosis - resolved   UA without UTI   CXR without acute findings     Covid, flu, RSV negative      HTN  Continue Lisinopril to 20mg on 05/21/2024     BPH  Continue home Flomax     Iron deficiency   Continue home iron supplementation     Bipolar disorder, insomnia, dementia  Continue home Mirtazapine   Daughter requesting NH placement at [facility] -  consulted      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   PT/OT - needs appointment with [facility] due to poorly fitted prosthetic      DVT prophylaxis:  Lovenox     Anticipated discharge and disposition: Awaiting  "placement  __________________________________________________________________________    LABS/MICRO/MEDS/DIAGNOSTICS     LABS  No results for input(s): "NA", "K", "CHLORIDE", "CO2", "BUN", "CREATININE", "GLUCOSE", "CALCIUM", "ALKPHOS", "AST", "ALT", "ALBUMIN" in the last 72 hours.    No results for input(s): "WBC", "RBC", "HCT", "MCV", "PLT" in the last 72 hours.    Invalid input(s): "HG"    MICROBIOLOGY  Microbiology Results (last 7 days)       ** No results found for the last 168 hours. **             MEDICATIONS   aspirin  81 mg Oral Daily    citalopram  10 mg Oral Daily    empagliflozin  25 mg Oral Daily    enoxparin  40 mg Subcutaneous Daily    ferrous sulfate  1 tablet Oral Daily    lisinopriL  20 mg Oral Daily    metFORMIN  1,000 mg Oral BID    mirtazapine  15 mg Oral QHS    tamsulosin  0.4 mg Oral Daily         INFUSIONS       DIAGNOSTIC TESTS  No orders to display      No results found for: "EF"     NUTRITION STATUS  Patient meets ASPEN criteria for   malnutrition of   per RD assessment as evidenced by:                       A minimum of two characteristics is recommended for diagnosis of either severe or non-severe malnutrition.     Case related differential diagnoses have been reviewed; assessment and plan has been documented. I have personally reviewed the labs and test results that are currently available; I have reviewed the patients medication list. I have reviewed the consulting providers recommendations. I have reviewed or attempted to review medical records based upon their availability.  All of the patient's and/or family's questions have been addressed and answered to the best of my ability.  I will continue to monitor closely and make adjustments to medical management as needed.  This document was created using M*Modal Fluency Direct.  Transcription errors may have been made.  Please contact me if any questions may rise regarding documentation to clarify transcription.      ALICE Hilton "   Internal Medicine  Department of Hospital Medicine [facility]

## 2024-05-23 NOTE — PLAN OF CARE
Received notification via [facility] that [facility] H[facility] and [facility] are unable to accept patient at this time. [facility] is still reviewing. Left message for Giana Copeland at [facility] for an update on referral. Awaiting call back

## 2024-05-23 NOTE — PT/OT/SLP PROGRESS
Occupational Therapy  Treatment    Name: Josep Angulo    : 1950 (73 y.o.)  MRN: [id number]           TREATMENT SUMMARY AND RECOMMENDATIONS:      OT Date of Treatment: 24  OT Start Time: 1300  OT Stop Time: 1325  OT Total Time (min): 25 min      Subjective Assessment:  x No complaints  Lethargic   x Awake, alert, cooperative  Impulsive    Uncooperative   Flat affect    Agitated  c/o pain    Appropriate  c/o fatigue    Confused  Treated at bedside     Emotionally labile x Treated in gym/dept.      Other:        Therapy Precautions:    Cognitive deficits  Spinal precautions    Collar - hard  Sternal precautions    Collar - soft   TLSO   x Fall risk  LSO    Hip precautions - posterior  Knee immobilizer    Hip precautions - anterior  WBAT    Impaired communication  Partial weightbearing    Oxygen  TTWB    PEG tube  NWB    Visual deficits      Hearing deficits   Other:        Treatment Objectives:     Mobility Training:    Mobility task Assist level Comments    Bed mobility Mod (I)  EOB>supine   Transfer SBA Modified squat/pivot t/f [place]>w/c, w/c>EOB    Sit to stands transitions     Functional mobility     Sitting balance     Standing balance      Other:        ADL Training:    ADL Assist level Comments    Feeding     Grooming/hygiene     Bathing     Upper body dressing     Lower body dressing     Toileting CGA Pt requesting toileting however unsuccessful with BM. Pt able to manage clothing on/off hips    Toilet transfer     Adaptive equipment training     Other:           Therapeutic Exercise:   Exercise Sets Reps Comments   Arm bike 2 5 min Level 1; F/B   Sit to stands 2 5 reps SBA from w/c level                    Assessment: Patient tolerated session well. Pt had positive response to today's treatment. Pt continues to make good progress towards goals. Pt would continue to benefit from skilled OT services to improve pt's safety and independence with daily occupations, decrease caregiver burden, and  reduce pt's risk of falls.     GOALS:   Multidisciplinary Problems       Occupational Therapy Goals          Problem: Occupational Therapy    Goal Priority Disciplines Outcome Interventions   Occupational Therapy Goal     OT, PT/OT Progressing    Description: Goals to be met by: 6/4/24     Patient will increase functional independence with ADLs by performing:    LE Dressing with Contact Guard Assistance.  Grooming while standing at sink with Modified [place].  Toileting from toilet with Contact Guard Assistance for hygiene and clothing management.   Bathing from  shower chair/bench with Contact Guard Assistance.  Toilet transfer to toilet with Stand-by Assistance.                         Recommendations:     Discharge Equipment Recommendations:  none     Plan:     Patient to be seen 6 x/week to address the above listed problems via self-care/home management, therapeutic activities, therapeutic exercises, wheelchair management/training  Plan of Care Expires: 06/04/24  Plan of Care Reviewed with: patient  Revisions made to plan of care: No      Skilled OT Minutes Provided: 25  Communication with Treatment Team:     Equipment recommendations:       At end of treatment, patient remained:   Up in chair     Up in wheelchair in room   x In bed   x With alarm activated   x Bed rails up   x Call bell in reach     Family/friends present    Restraints secured properly    In bathroom with CNA/RN notified    In gym with PT/PTA/tech    Nurse aware    Other:

## 2024-05-23 NOTE — PLAN OF CARE
Problem: Adult Inpatient Plan of Care  Goal: Plan of Care Review  Outcome: Progressing  Flowsheets (Taken 5/22/2024 1925)  Plan of Care Reviewed With: patient  Goal: Patient-Specific Goal (Individualized)  Outcome: Progressing  Flowsheets (Taken 5/22/2024 1925)  Individualized Care Needs: Safety with transfers to commode, Monitor blood sugar, Monitor loose stools  Anxieties, Fears or Concerns: None verbalized  Goal: Absence of Hospital-Acquired Illness or Injury  Outcome: Progressing  Intervention: Identify and Manage Fall Risk  Flowsheets (Taken 5/22/2024 1925)  Safety Promotion/Fall Prevention:   assistive device/personal item within reach   bed alarm set   muscle strengthening facilitated   nonskid shoes/socks when out of bed   lighting adjusted   medications reviewed   room near unit station  Intervention: Prevent Skin Injury  Flowsheets (Taken 5/22/2024 1925)  Body Position: sitting up in bed  Skin Protection:   incontinence pads utilized   skin sealant/moisture barrier applied   transparent dressing maintained  Device Skin Pressure Protection:   absorbent pad utilized/changed   positioning supports utilized   tubing/devices free from skin contact  Intervention: Prevent and Manage VTE (Venous Thromboembolism) Risk  Flowsheets (Taken 5/22/2024 1925)  VTE Prevention/Management:   bleeding risk assessed   bleeding precations maintained   ROM (active) performed   fluids promoted  Intervention: Prevent Infection  Flowsheets (Taken 5/22/2024 1925)  Infection Prevention:   cohorting utilized   hand hygiene promoted   single patient room provided  Goal: Optimal Comfort and Wellbeing  Outcome: Progressing  Intervention: Monitor Pain and Promote Comfort  Flowsheets (Taken 5/22/2024 1925)  Pain Management Interventions:   diversional activity provided   pillow support provided   position adjusted   quiet environment facilitated  Intervention: Provide Person-Centered Care  Flowsheets (Taken 5/22/2024 1925)  Trust  Relationship/Rapport:   care explained   choices provided   questions answered   thoughts/feelings acknowledged  Goal: Readiness for Transition of Care  Outcome: Progressing     Problem: Diabetes Comorbidity  Goal: Blood Glucose Level Within Targeted Range  Outcome: Progressing  Intervention: Monitor and Manage Glycemia  Flowsheets (Taken 5/22/2024 1925)  Glycemic Management: blood glucose monitored     Problem: Fall Injury Risk  Goal: Absence of Fall and Fall-Related Injury  Outcome: Progressing  Intervention: Identify and Manage Contributors  Flowsheets (Taken 5/22/2024 1925)  Self-Care Promotion:   BADL personal objects within reach   BADL personal routines maintained   adaptive equipment use encouraged  Medication Review/Management: medications reviewed  Intervention: Promote Injury-Free Environment  Flowsheets (Taken 5/22/2024 1925)  Safety Promotion/Fall Prevention:   assistive device/personal item within reach   bed alarm set   muscle strengthening facilitated   nonskid shoes/socks when out of bed   lighting adjusted   medications reviewed   room near unit station     Problem: Wound  Goal: Optimal Coping  Outcome: Progressing  Intervention: Support Patient and Family Response  Flowsheets (Taken 5/22/2024 1925)  Supportive Measures:   active listening utilized   goal-setting facilitated   self-care encouraged  Family/Support System Care: support provided  Goal: Optimal Functional Ability  Outcome: Progressing  Intervention: Optimize Functional Ability  Flowsheets (Taken 5/22/2024 1925)  Assistive Device Utilized:   gait belt   walker  Activity Management: Up in chair - L3  Activity Assistance Provided: assistance, 2 people  Goal: Absence of Infection Signs and Symptoms  Outcome: Progressing  Intervention: Prevent or Manage Infection  Flowsheets (Taken 5/22/2024 1925)  Fever Reduction/Comfort Measures:   lightweight bedding   lightweight clothing  Infection Management: aseptic technique maintained  Isolation  Precautions:   protective   precautions maintained  Goal: Improved Oral Intake  Outcome: Progressing  Intervention: Promote and Optimize Oral Intake  Flowsheets (Taken 5/22/2024 1925)  Oral Nutrition Promotion:   adaptive equipment use encouraged   calorie-dense foods provided   calorie-dense liquids provided   social interaction promoted   rest periods promoted  Goal: Optimal Pain Control and Function  Outcome: Progressing  Intervention: Prevent or Manage Pain  Flowsheets (Taken 5/22/2024 1925)  Sleep/Rest Enhancement:   consistent schedule promoted   regular sleep/rest pattern promoted  Pain Management Interventions:   diversional activity provided   pillow support provided   position adjusted   quiet environment facilitated  Goal: Skin Health and Integrity  Outcome: Progressing  Intervention: Optimize Skin Protection  Flowsheets (Taken 5/22/2024 1925)  Pressure Reduction Techniques:   frequent weight shift encouraged   rest period provided between sit times   weight shift assistance provided  Pressure Reduction Devices: positioning supports utilized  Skin Protection:   incontinence pads utilized   skin sealant/moisture barrier applied   transparent dressing maintained  Activity Management: Up in chair - L3  Head of Bed (HOB) Positioning: HOB at 30-45 degrees  Goal: Optimal Wound Healing  Outcome: Progressing  Intervention: Promote Wound Healing  Flowsheets (Taken 5/22/2024 1925)  Sleep/Rest Enhancement:   consistent schedule promoted   regular sleep/rest pattern promoted     Problem: Infection  Goal: Absence of Infection Signs and Symptoms  Outcome: Progressing  Intervention: Prevent or Manage Infection  Flowsheets (Taken 5/22/2024 1925)  Fever Reduction/Comfort Measures:   lightweight bedding   lightweight clothing  Infection Management: aseptic technique maintained  Isolation Precautions:   protective   precautions maintained

## 2024-05-23 NOTE — PLAN OF CARE
[facility]  Discharge Final Note    Primary Care Provider: Sami Rothman MD    Expected Discharge Date: 5/21/2024    Final Discharge Note (most recent)       Final Note - 05/1950          Final Note    Assessment Type Final Discharge Note     Anticipated Discharge Disposition Swing Bed     What phone number can be called within the next 1-3 days to see how you are doing after discharge? [phone]     Hospital Resources/Appts/Education Provided Provided patient/caregiver with written discharge plan information        Post-Acute Status    Discharge Delays None known at this time                     Important Message from Medicare             Contact Info       Sami Rothman MD   Specialty: Family Medicine   Relationship: PCP - General    [address]  [place] [address]   Phone: [phone]       Next Steps: Follow up

## 2024-05-23 NOTE — PT/OT/SLP PROGRESS
Occupational Therapy  Treatment    Name: Josep Angulo    : 1950 (73 y.o.)  MRN: [id number]           TREATMENT SUMMARY AND RECOMMENDATIONS:      OT Date of Treatment: 24  OT Start Time: 900  OT Stop Time: 927  OT Total Time (min): 27 min      Subjective Assessment:  x No complaints  Lethargic   x Awake, alert, cooperative  Impulsive    Uncooperative   Flat affect    Agitated  c/o pain    Appropriate  c/o fatigue    Confused  Treated at bedside     Emotionally labile x Treated in gym/dept.      Other:        Therapy Precautions:    Cognitive deficits  Spinal precautions    Collar - hard  Sternal precautions    Collar - soft   TLSO   x Fall risk  LSO    Hip precautions - posterior  Knee immobilizer    Hip precautions - anterior  WBAT    Impaired communication  Partial weightbearing    Oxygen  TTWB    PEG tube  NWB    Visual deficits      Hearing deficits   Other:        Treatment Objectives:     Mobility Training:    Mobility task Assist level Comments    Bed mobility     Transfer SBA Modified squat/pivot t/f BSChair>w/c   Sit to stands transitions     Functional mobility     Sitting balance     Standing balance  CGA Pt stood with RW anterior and performed shoulder ROM arch alternating UEs. No LOB noted. Pt able to tolerate standing over 3 min without rest break   Other: w/c mobility  CGA X150 feet propelling with BUEs; cues needed for coordination of UEs. CGA needed for veering R<>L     ADL Training:    ADL Assist level Comments    Feeding     Grooming/hygiene     Bathing     Upper body dressing     Lower body dressing SBA Pt able to priya R shoe    Toileting     Toilet transfer     Adaptive equipment training     Other:           Therapeutic Exercise:   Exercise Sets Reps Comments   3# dowel 2 10 Shoulder press, chest press, straight arm raises, bicep curls, forward rows, backwards rows   Red flexbar  1 20 Wrist flex/ext, forearm pro/sup                   Assessment: Patient tolerated session well.  Pt had positive response to today's treatment. Pt continues to make good progress towards goals. Pt would continue to benefit from skilled OT services to improve pt's safety and independence with daily occupations, decrease caregiver burden, and reduce pt's risk of falls.     GOALS:   Multidisciplinary Problems       Occupational Therapy Goals          Problem: Occupational Therapy    Goal Priority Disciplines Outcome Interventions   Occupational Therapy Goal     OT, PT/OT Progressing    Description: Goals to be met by: 6/4/24     Patient will increase functional independence with ADLs by performing:    LE Dressing with Contact Guard Assistance.  Grooming while standing at sink with Modified [place].  Toileting from toilet with Contact Guard Assistance for hygiene and clothing management.   Bathing from  shower chair/bench with Contact Guard Assistance.  Toilet transfer to toilet with Stand-by Assistance.                         Recommendations:     Discharge Equipment Recommendations:  none     Plan:     Patient to be seen 6 x/week to address the above listed problems via self-care/home management, therapeutic activities, therapeutic exercises, wheelchair management/training  Plan of Care Expires: 06/04/24  Plan of Care Reviewed with: patient  Revisions made to plan of care: No      Skilled OT Minutes Provided: 27  Communication with Treatment Team:     Equipment recommendations:       At end of treatment, patient remained:   Up in chair     Up in wheelchair in room    In bed    With alarm activated    Bed rails up    Call bell in reach     Family/friends present    Restraints secured properly    In bathroom with CNA/RN notified   x In gym with PT/PTA/tech    Nurse aware    Other:

## 2024-05-23 NOTE — PT/OT/SLP PROGRESS
Physical Therapy Treatment Note           Name: Josep Angulo    : 1950 (73 y.o.)  MRN: [id number]           TREATMENT SUMMARY AND RECOMMENDATIONS:    PT Received On: 24  PT Start Time: 928     PT Stop Time: 954  PT Total Time (min): 26 min     Subjective Assessment:   No complaints  Lethargic   x Awake, alert, cooperative  Uncooperative    Agitated  c/o pain    Appropriate  c/o fatigue    Confused  Treated at bedside     Emotionally labile x Treated in gym/dept.    Impulsive  Other:    Flat affect       Therapy Precautions:    Cognitive deficits  Spinal precautions    Collar - hard  Sternal precautions    Collar - soft   TLSO   x Fall risk  LSO    Hip precautions - posterior  Knee immobilizer    Hip precautions - anterior  WBAT    Impaired communication  Partial weightbearing    Oxygen  TTWB    PEG tube  NWB    Visual deficits x Other: L BKA (poor fitting prosthesis in room)    Hearing deficits          Treatment Objectives:     Mobility Training:   Assist level Comments    Bed mobility     Transfer CGA to SBA Stand pivot wheelchair>bed using RW   Gait CGA Amb on firm surface with RW  3 x 25 ft 3 point gait patterns due to L BKA   Sit to stand transitions CGA to SBA Sit-stand multiple reps throughout session with B UE use   Sitting balance     Standing balance      Wheelchair mobility SBA 2 X 175 feet, improving I with turns, but extra time still needed.  Ran into wall x 1   Car transfer     Other:          Therapeutic Exercise:   Exercise Sets Reps Comments                               Additional Comments:  No issues noted, overall improving I and strength demonstrated.     Assessment: Patient tolerated session well. Awaiting NH placement     PT Plan: continue  Revisions made to plan of care: No    GOALS:   Multidisciplinary Problems       Physical Therapy Goals          Problem: Physical Therapy    Goal Priority Disciplines Outcome Goal Variances Interventions   Physical Therapy Goal      PT, PT/OT Progressing     Description: Description: Goals to be met by: Discharge       Patient will increase functional independence with mobility by performin. Sit to stand transfer with Supervision  2. Bed to chair transfer with Supervision using Rolling Walker  3. Wheelchair propulsion x 300 feet with Modified [place] using bilateral uppper extremities.   4. Gait x 50 feet at SPV levels using RW with or without prosthesis (currently poor fitting and needs  follow op)                         Skilled PT Minutes Provided: 26   Communication with Treatment Team:     Equipment recommendations:       At end of treatment, patient remained:   Up in chair     Up in wheelchair in room   x In bed   x With alarm activated   x Bed rails up   x Call bell in reach     Family/friends present    Restraints secured properly    In bathroom with CNA/RN notified    Nurse aware    In gym with therapist/tech    Other:

## 2024-05-24 LAB
ANION GAP SERPL CALC-SCNC: 10 MEQ/L
ANION GAP SERPL CALC-SCNC: 6 MEQ/L
BASOPHILS # BLD AUTO: 0.04 X10(3)/MCL
BASOPHILS NFR BLD AUTO: 0.6 %
BUN SERPL-MCNC: 25.7 MG/DL (ref 8.4–25.7)
BUN SERPL-MCNC: 26.5 MG/DL (ref 8.4–25.7)
CALCIUM SERPL-MCNC: 8.5 MG/DL (ref 8.8–10)
CALCIUM SERPL-MCNC: 9.4 MG/DL (ref 8.8–10)
CHLORIDE SERPL-SCNC: 101 MMOL/L (ref 98–107)
CHLORIDE SERPL-SCNC: 103 MMOL/L (ref 98–107)
CO2 SERPL-SCNC: 25 MMOL/L (ref 23–31)
CO2 SERPL-SCNC: 28 MMOL/L (ref 23–31)
CREAT SERPL-MCNC: 1.1 MG/DL (ref 0.73–1.18)
CREAT SERPL-MCNC: 1.17 MG/DL (ref 0.73–1.18)
CREAT/UREA NIT SERPL: 22
CREAT/UREA NIT SERPL: 24
EOSINOPHIL # BLD AUTO: 0.23 X10(3)/MCL (ref 0–0.9)
EOSINOPHIL NFR BLD AUTO: 3.2 %
ERYTHROCYTE [DISTWIDTH] IN BLOOD BY AUTOMATED COUNT: 13.9 % (ref 11.5–17)
GFR SERPLBLD CREATININE-BSD FMLA CKD-EPI: >60 ML/MIN/1.73/M2
GFR SERPLBLD CREATININE-BSD FMLA CKD-EPI: >60 ML/MIN/1.73/M2
GLUCOSE SERPL-MCNC: 106 MG/DL (ref 70–110)
GLUCOSE SERPL-MCNC: 106 MG/DL (ref 82–115)
GLUCOSE SERPL-MCNC: 107 MG/DL (ref 82–115)
GLUCOSE SERPL-MCNC: 219 MG/DL (ref 70–110)
HCT VFR BLD AUTO: 35.6 % (ref 42–52)
HGB BLD-MCNC: 11.5 G/DL (ref 14–18)
IMM GRANULOCYTES # BLD AUTO: 0.01 X10(3)/MCL (ref 0–0.04)
IMM GRANULOCYTES NFR BLD AUTO: 0.1 %
LYMPHOCYTES # BLD AUTO: 2.16 X10(3)/MCL (ref 0.6–4.6)
LYMPHOCYTES NFR BLD AUTO: 29.9 %
MCH RBC QN AUTO: 27.7 PG (ref 27–31)
MCHC RBC AUTO-ENTMCNC: 32.3 G/DL (ref 33–36)
MCV RBC AUTO: 85.8 FL (ref 80–94)
MONOCYTES # BLD AUTO: 0.31 X10(3)/MCL (ref 0.1–1.3)
MONOCYTES NFR BLD AUTO: 4.3 %
NEUTROPHILS # BLD AUTO: 4.47 X10(3)/MCL (ref 2.1–9.2)
NEUTROPHILS NFR BLD AUTO: 61.9 %
PLATELET # BLD AUTO: 449 X10(3)/MCL (ref 130–400)
PMV BLD AUTO: 9.6 FL (ref 7.4–10.4)
POCT GLUCOSE: 118 MG/DL (ref 70–110)
POCT GLUCOSE: 211 MG/DL (ref 70–110)
POCT GLUCOSE: 219 MG/DL (ref 70–110)
POTASSIUM SERPL-SCNC: 4.8 MMOL/L (ref 3.5–5.1)
POTASSIUM SERPL-SCNC: 5.6 MMOL/L (ref 3.5–5.1)
RBC # BLD AUTO: 4.15 X10(6)/MCL (ref 4.7–6.1)
SODIUM SERPL-SCNC: 136 MMOL/L (ref 136–145)
SODIUM SERPL-SCNC: 137 MMOL/L (ref 136–145)
WBC # SPEC AUTO: 7.22 X10(3)/MCL (ref 4.5–11.5)

## 2024-05-24 PROCEDURE — 97535 SELF CARE MNGMENT TRAINING: CPT

## 2024-05-24 PROCEDURE — 97110 THERAPEUTIC EXERCISES: CPT

## 2024-05-24 PROCEDURE — 80048 BASIC METABOLIC PNL TOTAL CA: CPT | Performed by: STUDENT IN AN ORGANIZED HEALTH CARE EDUCATION/TRAINING PROGRAM

## 2024-05-24 PROCEDURE — 80048 BASIC METABOLIC PNL TOTAL CA: CPT | Performed by: PHYSICIAN ASSISTANT

## 2024-05-24 PROCEDURE — 25000242 PHARM REV CODE 250 ALT 637 W/ HCPCS: Performed by: PHYSICIAN ASSISTANT

## 2024-05-24 PROCEDURE — 97530 THERAPEUTIC ACTIVITIES: CPT

## 2024-05-24 PROCEDURE — 25000003 PHARM REV CODE 250: Performed by: PHYSICIAN ASSISTANT

## 2024-05-24 PROCEDURE — 97116 GAIT TRAINING THERAPY: CPT

## 2024-05-24 PROCEDURE — 36415 COLL VENOUS BLD VENIPUNCTURE: CPT | Performed by: STUDENT IN AN ORGANIZED HEALTH CARE EDUCATION/TRAINING PROGRAM

## 2024-05-24 PROCEDURE — 36415 COLL VENOUS BLD VENIPUNCTURE: CPT | Performed by: PHYSICIAN ASSISTANT

## 2024-05-24 PROCEDURE — 63600175 PHARM REV CODE 636 W HCPCS: Performed by: PHYSICIAN ASSISTANT

## 2024-05-24 PROCEDURE — 99900035 HC TECH TIME PER 15 MIN (STAT)

## 2024-05-24 PROCEDURE — 94760 N-INVAS EAR/PLS OXIMETRY 1: CPT

## 2024-05-24 PROCEDURE — 85025 COMPLETE CBC W/AUTO DIFF WBC: CPT | Performed by: PHYSICIAN ASSISTANT

## 2024-05-24 PROCEDURE — 11000004 HC SNF PRIVATE

## 2024-05-24 RX ORDER — SODIUM CHLORIDE 9 MG/ML
INJECTION, SOLUTION INTRAVENOUS CONTINUOUS
Status: ACTIVE | OUTPATIENT
Start: 2024-05-24 | End: 2024-05-24

## 2024-05-24 RX ADMIN — ASPIRIN 81 MG: 81 TABLET, COATED ORAL at 08:05

## 2024-05-24 RX ADMIN — METFORMIN HYDROCHLORIDE 1000 MG: 500 TABLET ORAL at 09:05

## 2024-05-24 RX ADMIN — EMPAGLIFLOZIN 25 MG: 25 TABLET, FILM COATED ORAL at 08:05

## 2024-05-24 RX ADMIN — CITALOPRAM HYDROBROMIDE 10 MG: 10 TABLET ORAL at 08:05

## 2024-05-24 RX ADMIN — INSULIN ASPART 2 UNITS: 100 INJECTION, SOLUTION INTRAVENOUS; SUBCUTANEOUS at 09:05

## 2024-05-24 RX ADMIN — INSULIN ASPART 4 UNITS: 100 INJECTION, SOLUTION INTRAVENOUS; SUBCUTANEOUS at 11:05

## 2024-05-24 RX ADMIN — SODIUM POLYSTYRENE SULFONATE 30 G: 15 SUSPENSION ORAL; RECTAL at 08:05

## 2024-05-24 RX ADMIN — LISINOPRIL 20 MG: 20 TABLET ORAL at 08:05

## 2024-05-24 RX ADMIN — TAMSULOSIN HYDROCHLORIDE 0.4 MG: 0.4 CAPSULE ORAL at 08:05

## 2024-05-24 RX ADMIN — FERROUS SULFATE TAB 325 MG (65 MG ELEMENTAL FE) 1 EACH: 325 (65 FE) TAB at 08:05

## 2024-05-24 RX ADMIN — METFORMIN HYDROCHLORIDE 1000 MG: 500 TABLET ORAL at 08:05

## 2024-05-24 RX ADMIN — SODIUM CHLORIDE: 9 INJECTION, SOLUTION INTRAVENOUS at 08:05

## 2024-05-24 RX ADMIN — MIRTAZAPINE 15 MG: 7.5 TABLET, FILM COATED ORAL at 09:05

## 2024-05-24 RX ADMIN — ENOXAPARIN SODIUM 40 MG: 40 INJECTION SUBCUTANEOUS at 04:05

## 2024-05-24 NOTE — PROGRESS NOTES
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 Daughter reports patient was unable to follow commands and falling asleep easily. Upon arrival to the ER, vitals revealed elevated temperature reading and mildly elevated BP. Labs were significant for leukocytosis, hypoglycemia with a glucose of 28, elevated CRP. Patient was given a D10 infusion with improvement of glucose to 126. CXR revealed prominent interstitial markings with no focal opacification, no acute cardiopulmonary process appreciated. CT head revealed no acute intracranial abnormality identified with chronic microvascular ischemic disease. Daughter at bedside brought patient's home medication bottles. Patient had 4 bottles of Metformin 1000mg BID which daughter reports patient only takes once daily and 3 bottles of Glimepiride 2mg BID. Daughter reports she gets help from a nurse who comes to her home to organize the patient's medication therefore she does not believe he could have taken too much medication. On exam, CBG registered as <20. He did receive a one time dose of Octreotide 50mcg and D5NS at 100cc/hr. Patient is being admitted to hospital medicine service.      His glucose improved after the dose of octreotide.  D5 NS was able to be discontinued.  He is now on metformin a 1000 mg b.i.d. and tolerating well.  Blood pressure was noted to be elevated and home lisinopril has been increased to 20 mg daily starting today.  Patient is being transitioned to our swing unit for continued therapy while awaiting placement at nursing home.    Today:  No reported complaints today. Renal function and potassium increased  today. Will give IV fluids and a dose of Kayexalate today.     OBJECTIVE/PHYSICAL EXAM     VITAL SIGNS (MOST RECENT):  Temp: 98.2 °F (36.8 °C) (05/24/24 0810)  Pulse: 89 (05/24/24 0810)  Resp: 18 (05/23/24 2040)  BP: 117/70 (05/24/24 0810)  SpO2: (!) 94 % (05/24/24 0810) VITAL SIGNS (24 HOUR RANGE):  Temp:  [97.9 °F (36.6 °C)-98.2 °F (36.8 °C)] 98.2 °F (36.8 °C)  Pulse:  [83-89] 89  Resp:  [18] 18  SpO2:  [94 %-96 %] 94 %  BP: (102-117)/(59-70) 117/70     GENERAL: In no acute distress, afebrile  HEENT: Atraumatic, normocephalic, moist mucous membranes   CHEST: Clear to auscultation bilaterally  HEART: S1, S2, no appreciable murmur  ABDOMEN: Soft, nontender, BS +  MSK: Warm, no lower extremity edema, left BKA   NEUROLOGIC: Alert and oriented, moving all extremities with good strength   INTEGUMENTARY: See media for right great toe   PSYCHIATRY: Appropriate mood and affect     ASSESSMENT/PLAN     Sulfonylurea induced hypoglycemia   Uncontrolled DM2   Hold home Glimepiride   Received D10 in ER   Received Octreotide 50mcg  ACHS CBGs, hypoglycemic protocol  A1c 6%   Continue Metformin to 1000mg BID and Jardiance 25mg QD      Acute metabolic encephalopathy - resolved   2/2 hypoglycemia and receiving sedating medications in the ER   Limit sedating medications   CT head revealed no acute intracranial abnormality identified with chronic microvascular ischemic disease     Leukocytosis - resolved   UA without UTI   CXR without acute findings     Covid, flu, RSV negative      HTN  Continue Lisinopril to 20mg on 05/21/2024     BPH  Continue home Flomax     Iron deficiency   Continue home iron supplementation     Bipolar disorder, insomnia, dementia  Continue home Mirtazapine   Daughter requesting NH placement at [facility] -  consulted      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   PT/OT - needs appointment with  due to poorly fitted prosthetic      DVT prophylaxis:  Lovenox     Anticipated discharge and disposition:  "Awaiting placement  __________________________________________________________________________    LABS/MICRO/MEDS/DIAGNOSTICS     LABS  Recent Labs     05/24/24  0447      K 5.6*   CHLORIDE 103   CO2 28   BUN 26.5*   CREATININE 1.10   GLUCOSE 106   CALCIUM 9.4     Recent Labs     05/24/24  0448   WBC 7.22   RBC 4.15*   HCT 35.6*   MCV 85.8   *     MICROBIOLOGY  Microbiology Results (last 7 days)       ** No results found for the last 168 hours. **             MEDICATIONS   aspirin  81 mg Oral Daily    citalopram  10 mg Oral Daily    empagliflozin  25 mg Oral Daily    enoxparin  40 mg Subcutaneous Daily    ferrous sulfate  1 tablet Oral Daily    lisinopriL  20 mg Oral Daily    metFORMIN  1,000 mg Oral BID    mirtazapine  15 mg Oral QHS    tamsulosin  0.4 mg Oral Daily         INFUSIONS   sodium chloride 0.9%   Intravenous Continuous 100 mL/hr at 05/24/24 0848 New Bag at 05/24/24 0848        DIAGNOSTIC TESTS  No orders to display      No results found for: "EF"     NUTRITION STATUS  Patient meets ASPEN criteria for   malnutrition of   per RD assessment as evidenced by:                       A minimum of two characteristics is recommended for diagnosis of either severe or non-severe malnutrition.     Case related differential diagnoses have been reviewed; assessment and plan has been documented. I have personally reviewed the labs and test results that are currently available; I have reviewed the patients medication list. I have reviewed the consulting providers recommendations. I have reviewed or attempted to review medical records based upon their availability.  All of the patient's and/or family's questions have been addressed and answered to the best of my ability.  I will continue to monitor closely and make adjustments to medical management as needed.  This document was created using M*Modal Fluency Direct.  Transcription errors may have been made.  Please contact me if any questions may rise " regarding documentation to clarify transcription.      ALICE Hilton   Internal Medicine  Department of Hospital Medicine [facility] - [facility] Surgical Unit

## 2024-05-24 NOTE — PT/OT/SLP PROGRESS
Physical Therapy Treatment Note           Name: Josep Angulo    : 1950 (73 y.o.)  MRN: [id number]           TREATMENT SUMMARY AND RECOMMENDATIONS:    PT Received On: 24  PT Start Time: 1250     PT Stop Time: 1315  PT Total Time (min): 25 min     Subjective Assessment:   No complaints  Lethargic   x Awake, alert, cooperative  Uncooperative    Agitated  c/o pain    Appropriate  c/o fatigue    Confused x Treated at bedside     Emotionally labile  Treated in gym/dept.    Impulsive  Other:    Flat affect       Therapy Precautions:    Cognitive deficits  Spinal precautions    Collar - hard  Sternal precautions    Collar - soft   TLSO   x Fall risk  LSO    Hip precautions - posterior  Knee immobilizer    Hip precautions - anterior  WBAT    Impaired communication  Partial weightbearing    Oxygen  TTWB    PEG tube  NWB    Visual deficits x Other: L BKA (poor fitting prosthesis in room)    Hearing deficits          Treatment Objectives:     Mobility Training:   Assist level Comments    Bed mobility     Transfer CGA to SBA modified pivot on R LE chair > wheelchair > toilet without RW   Gait CGA 2 x 30 ft, 60 ft using RW hopping on R LE due to L BKA   Sit to stand transitions CGA to SBA From chair and wheelchair using B UE   Sitting balance     Standing balance      Wheelchair mobility     Car transfer     Other:          Therapeutic Exercise:   Exercise Sets Reps Comments                               Additional Comments:  Awaiting NH placement    Assessment: Patient tolerated session well with increased distance with continuous amb using RW hopping on R LE      PT Plan: continue  Revisions made to plan of care: No    GOALS:   Multidisciplinary Problems       Physical Therapy Goals          Problem: Physical Therapy    Goal Priority Disciplines Outcome Goal Variances Interventions   Physical Therapy Goal     PT, PT/OT Progressing     Description: Description: Goals to be met by: Discharge       Patient  will increase functional independence with mobility by performin. Sit to stand transfer with Supervision  2. Bed to chair transfer with Supervision using Rolling Walker  3. Wheelchair propulsion x 300 feet with Modified [place] using bilateral uppper extremities.   4. Gait x 50 feet at SPV levels using RW with or without prosthesis (currently poor fitting and needs  follow op)                         Skilled PT Minutes Provided: 25   Communication with Treatment Team:     Equipment recommendations:       At end of treatment, patient remained:  x Up in chair     Up in wheelchair in room    In bed   x With alarm activated    Bed rails up   x Call bell in reach     Family/friends present    Restraints secured properly    In bathroom with CNA/RN notified    Nurse aware    In gym with therapist/tech    Other:

## 2024-05-24 NOTE — PT/OT/SLP PROGRESS
Occupational Therapy  Treatment    Name: Josep Angulo    : 1950 (73 y.o.)  MRN: [id number]           TREATMENT SUMMARY AND RECOMMENDATIONS:      OT Date of Treatment: 24  OT Start Time: 1030  OT Stop Time: 1100  OT Total Time (min): 30 min      Subjective Assessment:  x No complaints  Lethargic   x Awake, alert, cooperative  Impulsive    Uncooperative   Flat affect    Agitated  c/o pain    Appropriate  c/o fatigue    Confused x Treated at bedside     Emotionally labile x Treated in gym/dept.      Other:        Therapy Precautions:    Cognitive deficits  Spinal precautions    Collar - hard  Sternal precautions    Collar - soft   TLSO   x Fall risk  LSO    Hip precautions - posterior  Knee immobilizer    Hip precautions - anterior  WBAT    Impaired communication  Partial weightbearing    Oxygen  TTWB    PEG tube  NWB    Visual deficits      Hearing deficits   Other:        Treatment Objectives:     Mobility Training:    Mobility task Assist level Comments    Bed mobility Mod (I)  Supine>EOB   Transfer SBA Modified squat/pivot t/f EOB>w/c, w/c>[place]    Sit to stands transitions     Functional mobility     Sitting balance     Standing balance      Other:        ADL Training:    ADL Assist level Comments    Feeding     Grooming/hygiene     Bathing     Upper body dressing     Lower body dressing SBA Pt donned R shoe   Toileting CGA (+) small BM; pt able to perform clothing management and britney care with CGA for balance/safety    Toilet transfer CGA Modified stand/pivot t/f with GB w/c<>toilet    Adaptive equipment training     Other:           Therapeutic Exercise:   Exercise Sets Reps Comments   Arm bike 1 5 min Level 1; F/B   Red flexbar 1 20 Wrist flex/ext, forearm pro/sup   LE exercises 1 20 SLR, ankle pumps, hip flex/ext, hip abd/add             Assessment: Patient tolerated session well. Pt had positive response to today's treatment. Pt continues to make good progress towards goals. Pt would continue  to benefit from skilled OT services to improve pt's safety and independence with daily occupations, decrease caregiver burden, and reduce pt's risk of falls. Awaiting NH placement.     GOALS:   Multidisciplinary Problems       Occupational Therapy Goals          Problem: Occupational Therapy    Goal Priority Disciplines Outcome Interventions   Occupational Therapy Goal     OT, PT/OT Progressing    Description: Goals to be met by: 6/4/24     Patient will increase functional independence with ADLs by performing:    LE Dressing with Contact Guard Assistance.  Grooming while standing at sink with Modified [place].  Toileting from toilet with Contact Guard Assistance for hygiene and clothing management.   Bathing from  shower chair/bench with Contact Guard Assistance.  Toilet transfer to toilet with Stand-by Assistance.                         Recommendations:     Discharge Equipment Recommendations:  none     Plan:     Patient to be seen 6 x/week to address the above listed problems via self-care/home management, therapeutic activities, therapeutic exercises, wheelchair management/training  Plan of Care Expires: 06/04/24  Plan of Care Reviewed with: patient  Revisions made to plan of care: No      Skilled OT Minutes Provided: 30  Communication with Treatment Team:     Equipment recommendations:       At end of treatment, patient remained:   Up in chair     Up in wheelchair in room    In bed    With alarm activated    Bed rails up    Call bell in reach     Family/friends present    Restraints secured properly    In bathroom with CNA/RN notified    In gym with PT/PTA/tech    Nurse aware    Other:

## 2024-05-24 NOTE — PLAN OF CARE
Problem: Adult Inpatient Plan of Care  Goal: Plan of Care Review  Outcome: Progressing  Goal: Patient-Specific Goal (Individualized)  Outcome: Progressing  Flowsheets (Taken 5/24/2024 0730)  Individualized Care Needs: PT/OT, monitor blood sugars  Anxieties, Fears or Concerns: denies any concerns at this time  Goal: Absence of Hospital-Acquired Illness or Injury  Outcome: Progressing  Intervention: Identify and Manage Fall Risk  Flowsheets (Taken 5/24/2024 0730)  Safety Promotion/Fall Prevention:   assistive device/personal item within reach   Fall Risk signage in place   Fall Risk reviewed with patient/family   nonskid shoes/socks when out of bed   instructed to call staff for mobility   room near unit station  Intervention: Prevent Skin Injury  Flowsheets (Taken 5/24/2024 0730)  Body Position:   position changed independently   position maintained  Device Skin Pressure Protection:   pressure points protected   positioning supports utilized   tubing/devices free from skin contact   skin-to-skin areas padded   skin-to-device areas padded  Intervention: Prevent and Manage VTE (Venous Thromboembolism) Risk  Flowsheets (Taken 5/24/2024 0730)  VTE Prevention/Management:   bleeding risk assessed   remove, assess skin, and reapply sequential compression device   dorsiflexion/plantar flexion performed   fluids promoted  Intervention: Prevent Infection  Flowsheets (Taken 5/24/2024 0730)  Infection Prevention:   cohorting utilized   personal protective equipment utilized   environmental surveillance performed   rest/sleep promoted   equipment surfaces disinfected   single patient room provided   hand hygiene promoted  Goal: Optimal Comfort and Wellbeing  Outcome: Progressing  Intervention: Monitor Pain and Promote Comfort  Flowsheets (Taken 5/24/2024 0730)  Pain Management Interventions: care clustered  Intervention: Provide Person-Centered Care  Flowsheets (Taken 5/24/2024 0730)  Trust Relationship/Rapport:   care  explained   questions encouraged   choices provided   reassurance provided   emotional support provided   thoughts/feelings acknowledged   empathic listening provided   questions answered  Goal: Readiness for Transition of Care  Outcome: Progressing     Problem: Diabetes Comorbidity  Goal: Blood Glucose Level Within Targeted Range  Outcome: Progressing  Intervention: Monitor and Manage Glycemia  Flowsheets (Taken 5/24/2024 0730)  Glycemic Management:   blood glucose monitored   insulin dose matched to carbohydrate intake     Problem: Fall Injury Risk  Goal: Absence of Fall and Fall-Related Injury  Outcome: Progressing  Intervention: Identify and Manage Contributors  Flowsheets (Taken 5/24/2024 0730)  Self-Care Promotion: independence encouraged  Medication Review/Management: medications reviewed  Intervention: Promote Injury-Free Environment  Flowsheets (Taken 5/24/2024 0730)  Safety Promotion/Fall Prevention:   assistive device/personal item within reach   Fall Risk signage in place   Fall Risk reviewed with patient/family   nonskid shoes/socks when out of bed   instructed to call staff for mobility   room near unit station     Problem: Wound  Goal: Optimal Coping  Outcome: Progressing  Intervention: Support Patient and Family Response  Flowsheets (Taken 5/24/2024 0730)  Supportive Measures: active listening utilized  Family/Support System Care: self-care encouraged  Goal: Optimal Functional Ability  Outcome: Progressing  Intervention: Optimize Functional Ability  Flowsheets (Taken 5/24/2024 1210)  Activity Assistance Provided: assistance, 1 person  Goal: Absence of Infection Signs and Symptoms  Outcome: Progressing  Intervention: Prevent or Manage Infection  Flowsheets (Taken 5/24/2024 0730)  Infection Management: aseptic technique maintained  Isolation Precautions:   protective   precautions maintained  Goal: Improved Oral Intake  Outcome: Progressing  Intervention: Promote and Optimize Oral Intake  Flowsheets  (Taken 5/24/2024 0730)  Oral Nutrition Promotion: physical activity promoted  Nutrition Interventions: food preferences provided  Goal: Optimal Pain Control and Function  Outcome: Progressing  Intervention: Prevent or Manage Pain  Flowsheets (Taken 5/24/2024 0730)  Sleep/Rest Enhancement: awakenings minimized  Pain Management Interventions: care clustered  Goal: Skin Health and Integrity  Outcome: Progressing  Goal: Optimal Wound Healing  Outcome: Progressing     Problem: Infection  Goal: Absence of Infection Signs and Symptoms  Outcome: Progressing

## 2024-05-24 NOTE — PLAN OF CARE
Problem: Adult Inpatient Plan of Care  Goal: Plan of Care Review  Outcome: Progressing  Flowsheets (Taken 5/23/2024 2000)  Plan of Care Reviewed With: patient  Goal: Patient-Specific Goal (Individualized)  Outcome: Progressing  Flowsheets (Taken 5/23/2024 2000)  Individualized Care Needs: frequent roundings, PT/OT, monitor blood sugar, safety when transferring to [facility], monitor stools  Anxieties, Fears or Concerns: none expressed at this time  Goal: Absence of Hospital-Acquired Illness or Injury  Outcome: Progressing  Intervention: Prevent Skin Injury  Flowsheets (Taken 5/23/2024 2000)  Body Position: position maintained  Goal: Optimal Comfort and Wellbeing  Outcome: Progressing  Goal: Readiness for Transition of Care  Outcome: Progressing  Intervention: Mutually Develop Transition Plan  Flowsheets (Taken 5/23/2024 2696)  Communicated CIELO with patient/caregiver: Date not available/Unable to determine     Problem: Diabetes Comorbidity  Goal: Blood Glucose Level Within Targeted Range  Outcome: Progressing     Problem: Fall Injury Risk  Goal: Absence of Fall and Fall-Related Injury  Outcome: Progressing  Intervention: Identify and Manage Contributors  Flowsheets (Taken 5/23/2024 2000)  Self-Care Promotion:   independence encouraged   BADL personal objects within reach   BADL personal routines maintained   adaptive equipment use encouraged     Problem: Wound  Goal: Optimal Coping  Outcome: Progressing  Intervention: Support Patient and Family Response  Flowsheets (Taken 5/23/2024 2000)  Supportive Measures: active listening utilized  Goal: Optimal Functional Ability  Outcome: Progressing  Goal: Absence of Infection Signs and Symptoms  Outcome: Progressing  Goal: Improved Oral Intake  Outcome: Progressing  Intervention: Promote and Optimize Oral Intake  Flowsheets (Taken 5/23/2024 2000)  Nutrition Interventions: meal set-up provided  Goal: Optimal Pain Control and Function  Outcome: Progressing  Intervention: Prevent or  Manage Pain  Flowsheets (Taken 5/23/2024 2000)  Sleep/Rest Enhancement:   regular sleep/rest pattern promoted   relaxation techniques promoted   room darkened   noise level reduced   natural light exposure provided  Goal: Skin Health and Integrity  Outcome: Progressing  Goal: Optimal Wound Healing  Outcome: Progressing  Intervention: Promote Wound Healing  Flowsheets (Taken 5/23/2024 2000)  Sleep/Rest Enhancement:   regular sleep/rest pattern promoted   relaxation techniques promoted   room darkened   noise level reduced   natural light exposure provided     Problem: Infection  Goal: Absence of Infection Signs and Symptoms  Outcome: Progressing

## 2024-05-25 LAB
ANION GAP SERPL CALC-SCNC: 7 MEQ/L
BUN SERPL-MCNC: 24.3 MG/DL (ref 8.4–25.7)
CALCIUM SERPL-MCNC: 9.3 MG/DL (ref 8.8–10)
CHLORIDE SERPL-SCNC: 103 MMOL/L (ref 98–107)
CO2 SERPL-SCNC: 27 MMOL/L (ref 23–31)
CREAT SERPL-MCNC: 1.09 MG/DL (ref 0.73–1.18)
CREAT/UREA NIT SERPL: 22
GFR SERPLBLD CREATININE-BSD FMLA CKD-EPI: >60 ML/MIN/1.73/M2
GLUCOSE SERPL-MCNC: 141 MG/DL (ref 70–110)
GLUCOSE SERPL-MCNC: 142 MG/DL (ref 82–115)
GLUCOSE SERPL-MCNC: 230 MG/DL (ref 70–110)
POCT GLUCOSE: 141 MG/DL (ref 70–110)
POCT GLUCOSE: 164 MG/DL (ref 70–110)
POCT GLUCOSE: 178 MG/DL (ref 70–110)
POCT GLUCOSE: 200 MG/DL (ref 70–110)
POTASSIUM SERPL-SCNC: 4.7 MMOL/L (ref 3.5–5.1)
SODIUM SERPL-SCNC: 137 MMOL/L (ref 136–145)

## 2024-05-25 PROCEDURE — 63600175 PHARM REV CODE 636 W HCPCS: Performed by: PHYSICIAN ASSISTANT

## 2024-05-25 PROCEDURE — 25000003 PHARM REV CODE 250: Performed by: PHYSICIAN ASSISTANT

## 2024-05-25 PROCEDURE — 94760 N-INVAS EAR/PLS OXIMETRY 1: CPT

## 2024-05-25 PROCEDURE — 36415 COLL VENOUS BLD VENIPUNCTURE: CPT | Performed by: PHYSICIAN ASSISTANT

## 2024-05-25 PROCEDURE — 11000004 HC SNF PRIVATE

## 2024-05-25 PROCEDURE — 25000242 PHARM REV CODE 250 ALT 637 W/ HCPCS: Performed by: PHYSICIAN ASSISTANT

## 2024-05-25 PROCEDURE — 80048 BASIC METABOLIC PNL TOTAL CA: CPT | Performed by: PHYSICIAN ASSISTANT

## 2024-05-25 PROCEDURE — 99900035 HC TECH TIME PER 15 MIN (STAT)

## 2024-05-25 PROCEDURE — 97116 GAIT TRAINING THERAPY: CPT

## 2024-05-25 RX ADMIN — ASPIRIN 81 MG: 81 TABLET, COATED ORAL at 09:05

## 2024-05-25 RX ADMIN — METFORMIN HYDROCHLORIDE 1000 MG: 500 TABLET ORAL at 08:05

## 2024-05-25 RX ADMIN — CITALOPRAM HYDROBROMIDE 10 MG: 10 TABLET ORAL at 09:05

## 2024-05-25 RX ADMIN — EMPAGLIFLOZIN 25 MG: 25 TABLET, FILM COATED ORAL at 09:05

## 2024-05-25 RX ADMIN — ENOXAPARIN SODIUM 40 MG: 40 INJECTION SUBCUTANEOUS at 05:05

## 2024-05-25 RX ADMIN — LISINOPRIL 20 MG: 20 TABLET ORAL at 09:05

## 2024-05-25 RX ADMIN — MIRTAZAPINE 15 MG: 7.5 TABLET, FILM COATED ORAL at 08:05

## 2024-05-25 RX ADMIN — METFORMIN HYDROCHLORIDE 1000 MG: 500 TABLET ORAL at 09:05

## 2024-05-25 RX ADMIN — INSULIN ASPART 2 UNITS: 100 INJECTION, SOLUTION INTRAVENOUS; SUBCUTANEOUS at 05:05

## 2024-05-25 RX ADMIN — TAMSULOSIN HYDROCHLORIDE 0.4 MG: 0.4 CAPSULE ORAL at 09:05

## 2024-05-25 RX ADMIN — FERROUS SULFATE TAB 325 MG (65 MG ELEMENTAL FE) 1 EACH: 325 (65 FE) TAB at 09:05

## 2024-05-25 NOTE — PLAN OF CARE
Problem: Adult Inpatient Plan of Care  Goal: Plan of Care Review  Outcome: Progressing  Flowsheets (Taken 5/25/2024 0725)  Plan of Care Reviewed With: patient  Goal: Patient-Specific Goal (Individualized)  Outcome: Progressing  Flowsheets (Taken 5/25/2024 0725)  Individualized Care Needs: PT/OT monitor blood sugar, safety  Anxieties, Fears or Concerns: denies any concerns at this time  Goal: Absence of Hospital-Acquired Illness or Injury  Outcome: Progressing  Intervention: Identify and Manage Fall Risk  Flowsheets (Taken 5/25/2024 0725)  Safety Promotion/Fall Prevention:   assistive device/personal item within reach   medications reviewed  Intervention: Prevent Skin Injury  Flowsheets (Taken 5/25/2024 0725)  Body Position: position changed independently  Skin Protection: silicone foam dressing in place  Device Skin Pressure Protection: pressure points protected  Intervention: Prevent and Manage VTE (Venous Thromboembolism) Risk  Flowsheets (Taken 5/25/2024 0725)  VTE Prevention/Management:   bleeding risk assessed   dorsiflexion/plantar flexion performed  Intervention: Prevent Infection  Flowsheets (Taken 5/25/2024 0725)  Infection Prevention:   personal protective equipment utilized   cohorting utilized   environmental surveillance performed   rest/sleep promoted   equipment surfaces disinfected   single patient room provided   hand hygiene promoted  Goal: Optimal Comfort and Wellbeing  Outcome: Progressing  Intervention: Monitor Pain and Promote Comfort  Flowsheets (Taken 5/25/2024 0725)  Pain Management Interventions: care clustered  Intervention: Provide Person-Centered Care  Flowsheets (Taken 5/25/2024 0725)  Trust Relationship/Rapport:   care explained   questions encouraged   choices provided   reassurance provided   emotional support provided   thoughts/feelings acknowledged   empathic listening provided   questions answered  Goal: Readiness for Transition of Care  Outcome: Progressing     Problem:  Diabetes Comorbidity  Goal: Blood Glucose Level Within Targeted Range  Outcome: Progressing  Intervention: Monitor and Manage Glycemia  Flowsheets (Taken 5/25/2024 7508)  Glycemic Management:   blood glucose monitored   insulin dose matched to carbohydrate intake     Problem: Fall Injury Risk  Goal: Absence of Fall and Fall-Related Injury  Outcome: Progressing     Problem: Wound  Goal: Optimal Coping  Outcome: Progressing  Goal: Optimal Functional Ability  Outcome: Progressing  Goal: Absence of Infection Signs and Symptoms  Outcome: Progressing  Goal: Improved Oral Intake  Outcome: Progressing  Goal: Optimal Pain Control and Function  Outcome: Progressing  Goal: Skin Health and Integrity  Outcome: Progressing  Goal: Optimal Wound Healing  Outcome: Progressing     Problem: Infection  Goal: Absence of Infection Signs and Symptoms  Outcome: Progressing     Problem: Skin Injury Risk Increased  Goal: Skin Health and Integrity  Outcome: Progressing

## 2024-05-25 NOTE — PLAN OF CARE
Problem: Diabetes Comorbidity  Goal: Blood Glucose Level Within Targeted Range  5/25/2024 0022 by Corinne Serrano RN  Outcome: Progressing  5/24/2024 2335 by Corinne Serrano RN  Outcome: Progressing  Intervention: Monitor and Manage Glycemia  5/25/2024 0022 by Corinne Serrano RN  Flowsheets (Taken 5/24/2024 2020)  Glycemic Management:   blood glucose monitored   insulin dose matched to carbohydrate intake  5/24/2024 2335 by Corinne Serrano RN  Flowsheets (Taken 5/24/2024 2030)  Glycemic Management:   blood glucose monitored   insulin dose matched to carbohydrate intake     Problem: Fall Injury Risk  Goal: Absence of Fall and Fall-Related Injury  Outcome: Progressing  Intervention: Identify and Manage Contributors  Flowsheets (Taken 5/24/2024 2030)  Self-Care Promotion:   independence encouraged   BADL personal objects within reach   BADL personal routines maintained  Medication Review/Management: medications reviewed  Intervention: Promote Injury-Free Environment  Flowsheets (Taken 5/24/2024 2030)  Safety Promotion/Fall Prevention:   assistive device/personal item within reach   bed alarm set   commode/urinal/bedpan at bedside   Fall Risk reviewed with patient/family   lighting adjusted   medications reviewed   room near unit station   instructed to call staff for mobility     Problem: Wound  Goal: Optimal Coping  Outcome: Progressing  Intervention: Support Patient and Family Response  Flowsheets (Taken 5/24/2024 2020)  Supportive Measures: active listening utilized  Family/Support System Care: self-care encouraged

## 2024-05-25 NOTE — PT/OT/SLP PROGRESS
Physical Therapy Treatment Note           Name: Josep Angulo    : 1950 (73 y.o.)  MRN: [id number]           TREATMENT SUMMARY AND RECOMMENDATIONS:    PT Received On: 24  PT Start Time: 940     PT Stop Time: 955  PT Total Time (min): 15 min     Subjective Assessment:   No complaints  Lethargic   x Awake, alert, cooperative  Uncooperative    Agitated  c/o pain    Appropriate  c/o fatigue    Confused x Treated at bedside     Emotionally labile  Treated in gym/dept.    Impulsive  Other:    Flat affect       Therapy Precautions:    Cognitive deficits  Spinal precautions    Collar - hard  Sternal precautions    Collar - soft   TLSO   x Fall risk  LSO    Hip precautions - posterior  Knee immobilizer    Hip precautions - anterior  WBAT    Impaired communication  Partial weightbearing    Oxygen  TTWB    PEG tube  NWB    Visual deficits x Other: L BKA (poor fitting prosthesis)     Hearing deficits          Treatment Objectives:     Mobility Training:   Assist level Comments    Bed mobility     Transfer     Gait CGA 2x50 ft using RW and hopping technique on RLE, did not use L prosthesis due to poor fit.    Sit to stand transitions CGA From chair and EOB using BUE   Sitting balance     Standing balance      Wheelchair mobility     Car transfer     Other:          Therapeutic Exercise:   Exercise Sets Reps Comments                               Additional Comments:      Assessment: Patient tolerated session well.    PT Plan: cont POC  Revisions made to plan of care: No    GOALS:   Multidisciplinary Problems       Physical Therapy Goals          Problem: Physical Therapy    Goal Priority Disciplines Outcome Goal Variances Interventions   Physical Therapy Goal     PT, PT/OT Progressing     Description: Description: Goals to be met by: Discharge       Patient will increase functional independence with mobility by performin. Sit to stand transfer with Supervision  2. Bed to chair transfer with  Supervision using Rolling Walker  3. Wheelchair propulsion x 300 feet with Modified [place] using bilateral uppper extremities.   4. Gait x 50 feet at SPV levels using RW with or without prosthesis (currently poor fitting and needs  follow op)                         Skilled PT Minutes Provided: 15   Communication with Treatment Team:     Equipment recommendations:       At end of treatment, patient remained:   Up in chair     Up in wheelchair in room   x In bed   x With alarm activated   x Bed rails up   x Call bell in reach     Family/friends present    Restraints secured properly    In bathroom with CNA/RN notified    Nurse aware    In gym with therapist/tech    Other:

## 2024-05-25 NOTE — PLAN OF CARE
Problem: Diabetes Comorbidity  Goal: Blood Glucose Level Within Targeted Range  Outcome: Progressing  Intervention: Monitor and Manage Glycemia  Flowsheets (Taken 5/24/2024 2030)  Glycemic Management:   blood glucose monitored   insulin dose matched to carbohydrate intake     Problem: Fall Injury Risk  Goal: Absence of Fall and Fall-Related Injury  Outcome: Progressing  Intervention: Identify and Manage Contributors  Flowsheets (Taken 5/24/2024 2030)  Self-Care Promotion:   independence encouraged   BADL personal objects within reach   BADL personal routines maintained  Medication Review/Management: medications reviewed  Intervention: Promote Injury-Free Environment  Flowsheets (Taken 5/24/2024 2030)  Safety Promotion/Fall Prevention:   assistive device/personal item within reach   bed alarm set   commode/urinal/bedpan at bedside   Fall Risk reviewed with patient/family   lighting adjusted   medications reviewed   room near unit station   instructed to call staff for mobility

## 2024-05-25 NOTE — PROGRESS NOTES
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 Daughter reports patient was unable to follow commands and falling asleep easily. Upon arrival to the ER, vitals revealed elevated temperature reading and mildly elevated BP. Labs were significant for leukocytosis, hypoglycemia with a glucose of 28, elevated CRP. Patient was given a D10 infusion with improvement of glucose to 126. CXR revealed prominent interstitial markings with no focal opacification, no acute cardiopulmonary process appreciated. CT head revealed no acute intracranial abnormality identified with chronic microvascular ischemic disease. Daughter at bedside brought patient's home medication bottles. Patient had 4 bottles of Metformin 1000mg BID which daughter reports patient only takes once daily and 3 bottles of Glimepiride 2mg BID. Daughter reports she gets help from a nurse who comes to her home to organize the patient's medication therefore she does not believe he could have taken too much medication. On exam, CBG registered as <20. He did receive a one time dose of Octreotide 50mcg and D5NS at 100cc/hr. Patient is being admitted to hospital medicine service.      His glucose improved after the dose of octreotide.  D5 NS was able to be discontinued.  He is now on metformin a 1000 mg b.i.d. and tolerating well.  Blood pressure was noted to be elevated and home lisinopril has been increased to 20 mg daily starting today.  Patient is being transitioned to our swing unit for continued therapy while awaiting placement at nursing home.    Today:  No reported complaints  today  OBJECTIVE/PHYSICAL EXAM     VITAL SIGNS (MOST RECENT):  Temp: 98.4 °F (36.9 °C) (05/25/24 0701)  Pulse: 84 (05/25/24 0701)  Resp: 18 (05/24/24 2140)  BP: (!) 156/86 (05/25/24 0701)  SpO2: 97 % (05/25/24 0701) VITAL SIGNS (24 HOUR RANGE):  Temp:  [98.4 °F (36.9 °C)-99.3 °F (37.4 °C)] 98.4 °F (36.9 °C)  Pulse:  [84-90] 84  Resp:  [18] 18  SpO2:  [96 %-97 %] 97 %  BP: (123-156)/(73-86) 156/86   GENERAL: In no acute distress, afebrile  HEENT: Atraumatic, normocephalic, moist mucous membranes   CHEST: Clear to auscultation bilaterally  HEART: S1, S2, no appreciable murmur  ABDOMEN: Soft, nontender, BS +  MSK: Warm, no lower extremity edema, left BKA   NEUROLOGIC: Alert and oriented, moving all extremities with good strength   INTEGUMENTARY: See media for right great toe   PSYCHIATRY: Appropriate mood and affect     ASSESSMENT/PLAN   Sulfonylurea induced hypoglycemia   Uncontrolled DM2   Hold home Glimepiride   Received D10 in ER, Octreotide 50mcg  A1c 6%   Continue Metformin to 1000mg BID and Jardiance 25mg QD      Acute metabolic encephalopathy - resolved   2/2 hypoglycemia and receiving sedating medications in the ER   Limit sedating medications   CT head revealed no acute intracranial abnormality identified with chronic microvascular ischemic disease     Leukocytosis - resolved   UA without UTI   CXR without acute findings     Covid, flu, RSV negative      HTN  Continue Lisinopril to 20mg on 05/21/2024     BPH  Continue home Flomax     Iron deficiency   Continue home iron supplementation     Bipolar disorder, insomnia, dementia  Continue home Mirtazapine   Daughter requesting NH placement at [facility] -  consulted      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   PT/OT - needs appointment with [facility] due to poorly fitted prosthetic     Hypokalemia-resolved  -required 1 dose of Kayexalate     DVT prophylaxis:  Lovenox   Anticipated discharge and disposition: Awaiting  "placement  __________________________________________________________________________    LABS/MICRO/MEDS/DIAGNOSTICS     LABS  Recent Labs     05/25/24  0616      K 4.7   CHLORIDE 103   CO2 27   BUN 24.3   CREATININE 1.09   GLUCOSE 142*   CALCIUM 9.3     Recent Labs     05/24/24  0448   WBC 7.22   RBC 4.15*   HCT 35.6*   MCV 85.8   *     MICROBIOLOGY  Microbiology Results (last 7 days)       ** No results found for the last 168 hours. **             MEDICATIONS   aspirin  81 mg Oral Daily    citalopram  10 mg Oral Daily    empagliflozin  25 mg Oral Daily    enoxparin  40 mg Subcutaneous Daily    ferrous sulfate  1 tablet Oral Daily    lisinopriL  20 mg Oral Daily    metFORMIN  1,000 mg Oral BID    mirtazapine  15 mg Oral QHS    tamsulosin  0.4 mg Oral Daily         INFUSIONS         DIAGNOSTIC TESTS  No orders to display      No results found for: "EF"     NUTRITION STATUS  Patient meets ASPEN criteria for   malnutrition of   per RD assessment as evidenced by:                       A minimum of two characteristics is recommended for diagnosis of either severe or non-severe malnutrition.     Case related differential diagnoses have been reviewed; assessment and plan has been documented. I have personally reviewed the labs and test results that are currently available; I have reviewed the patients medication list. I have reviewed the consulting providers recommendations. I have reviewed or attempted to review medical records based upon their availability.  All of the patient's and/or family's questions have been addressed and answered to the best of my ability.  I will continue to monitor closely and make adjustments to medical management as needed.  This document was created using M*Modal Fluency Direct.  Transcription errors may have been made.  Please contact me if any questions may rise regarding documentation to clarify transcription.      Thairy G Reyes, DO   Internal Medicine  Department of " [facility]  [facility] - Medical Surgical Unit

## 2024-05-26 LAB
GLUCOSE SERPL-MCNC: 126 MG/DL (ref 70–110)
GLUCOSE SERPL-MCNC: 178 MG/DL (ref 70–110)
POCT GLUCOSE: 126 MG/DL (ref 70–110)
POCT GLUCOSE: 126 MG/DL (ref 70–110)
POCT GLUCOSE: 169 MG/DL (ref 70–110)
POCT GLUCOSE: 178 MG/DL (ref 70–110)
POCT GLUCOSE: 230 MG/DL (ref 70–110)

## 2024-05-26 PROCEDURE — 25000003 PHARM REV CODE 250: Performed by: PHYSICIAN ASSISTANT

## 2024-05-26 PROCEDURE — 11000004 HC SNF PRIVATE

## 2024-05-26 PROCEDURE — 25000242 PHARM REV CODE 250 ALT 637 W/ HCPCS: Performed by: PHYSICIAN ASSISTANT

## 2024-05-26 PROCEDURE — 99900035 HC TECH TIME PER 15 MIN (STAT)

## 2024-05-26 PROCEDURE — 63600175 PHARM REV CODE 636 W HCPCS: Performed by: PHYSICIAN ASSISTANT

## 2024-05-26 RX ADMIN — FERROUS SULFATE TAB 325 MG (65 MG ELEMENTAL FE) 1 EACH: 325 (65 FE) TAB at 08:05

## 2024-05-26 RX ADMIN — EMPAGLIFLOZIN 25 MG: 25 TABLET, FILM COATED ORAL at 08:05

## 2024-05-26 RX ADMIN — CITALOPRAM HYDROBROMIDE 10 MG: 10 TABLET ORAL at 08:05

## 2024-05-26 RX ADMIN — LISINOPRIL 20 MG: 20 TABLET ORAL at 08:05

## 2024-05-26 RX ADMIN — METFORMIN HYDROCHLORIDE 1000 MG: 500 TABLET ORAL at 08:05

## 2024-05-26 RX ADMIN — ASPIRIN 81 MG: 81 TABLET, COATED ORAL at 08:05

## 2024-05-26 RX ADMIN — INSULIN ASPART 2 UNITS: 100 INJECTION, SOLUTION INTRAVENOUS; SUBCUTANEOUS at 04:05

## 2024-05-26 RX ADMIN — ENOXAPARIN SODIUM 40 MG: 40 INJECTION SUBCUTANEOUS at 04:05

## 2024-05-26 RX ADMIN — MIRTAZAPINE 15 MG: 7.5 TABLET, FILM COATED ORAL at 08:05

## 2024-05-26 RX ADMIN — TAMSULOSIN HYDROCHLORIDE 0.4 MG: 0.4 CAPSULE ORAL at 08:05

## 2024-05-26 NOTE — PROGRESS NOTES
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 Daughter reports patient was unable to follow commands and falling asleep easily. Upon arrival to the ER, vitals revealed elevated temperature reading and mildly elevated BP. Labs were significant for leukocytosis, hypoglycemia with a glucose of 28, elevated CRP. Patient was given a D10 infusion with improvement of glucose to 126. CXR revealed prominent interstitial markings with no focal opacification, no acute cardiopulmonary process appreciated. CT head revealed no acute intracranial abnormality identified with chronic microvascular ischemic disease. Daughter at bedside brought patient's home medication bottles. Patient had 4 bottles of Metformin 1000mg BID which daughter reports patient only takes once daily and 3 bottles of Glimepiride 2mg BID. Daughter reports she gets help from a nurse who comes to her home to organize the patient's medication therefore she does not believe he could have taken too much medication. On exam, CBG registered as <20. He did receive a one time dose of Octreotide 50mcg and D5NS at 100cc/hr. Patient is being admitted to hospital medicine service.      His glucose improved after the dose of octreotide.  D5 NS was able to be discontinued.  He is now on metformin a 1000 mg b.i.d. and tolerating well.  Blood pressure was noted to be elevated and home lisinopril has been increased to 20 mg daily starting today.  Patient is being transitioned to our swing unit for continued therapy while awaiting placement at nursing home.    Today:  No reported complaints  today  OBJECTIVE/PHYSICAL EXAM     VITAL SIGNS (MOST RECENT):  Temp: 98.1 °F (36.7 °C) (05/26/24 0723)  Pulse: 84 (05/26/24 0723)  Resp: 18 (05/25/24 2110)  BP: (!) 156/97 (05/26/24 0723)  SpO2: 96 % (05/26/24 0723) VITAL SIGNS (24 HOUR RANGE):  Temp:  [98.1 °F (36.7 °C)] 98.1 °F (36.7 °C)  Pulse:  [84-89] 84  Resp:  [18] 18  SpO2:  [96 %-97 %] 96 %  BP: (113-156)/(64-97) 156/97   GENERAL: In no acute distress, afebrile  HEENT: Atraumatic, normocephalic, moist mucous membranes   CHEST: Clear to auscultation bilaterally  HEART: S1, S2, no appreciable murmur  ABDOMEN: Soft, nontender, BS +  MSK: Warm, no lower extremity edema, left BKA   NEUROLOGIC: Alert and oriented, moving all extremities with good strength   INTEGUMENTARY: See media for right great toe   PSYCHIATRY: Appropriate mood and affect     ASSESSMENT/PLAN   Sulfonylurea induced hypoglycemia   Uncontrolled DM2   Hold home Glimepiride   Received D10 in ER, Octreotide 50mcg  A1c 6%   Continue Metformin to 1000mg BID and Jardiance 25mg QD      Acute metabolic encephalopathy - resolved   2/2 hypoglycemia and receiving sedating medications in the ER   Limit sedating medications   CT head revealed no acute intracranial abnormality identified with chronic microvascular ischemic disease     Leukocytosis - resolved   UA without UTI   CXR without acute findings     Covid, flu, RSV negative      HTN  Continue Lisinopril to 20mg on 05/21/2024     BPH  Continue home Flomax     Iron deficiency   Continue home iron supplementation     Bipolar disorder, insomnia, dementia  Continue home Mirtazapine   Daughter requesting NH placement at [facility] -  consulted      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   PT/OT - needs appointment with [facility] due to poorly fitted prosthetic     Hypokalemia-resolved  -required 1 dose of Kayexalate     DVT prophylaxis:  Lovenox   Anticipated discharge and disposition: Awaiting  "placement  __________________________________________________________________________    LABS/MICRO/MEDS/DIAGNOSTICS     LABS  Recent Labs     05/25/24  0616      K 4.7   CHLORIDE 103   CO2 27   BUN 24.3   CREATININE 1.09   GLUCOSE 142*   CALCIUM 9.3     Recent Labs     05/24/24  0448   WBC 7.22   RBC 4.15*   HCT 35.6*   MCV 85.8   *     MICROBIOLOGY  Microbiology Results (last 7 days)       ** No results found for the last 168 hours. **             MEDICATIONS   aspirin  81 mg Oral Daily    citalopram  10 mg Oral Daily    empagliflozin  25 mg Oral Daily    enoxparin  40 mg Subcutaneous Daily    ferrous sulfate  1 tablet Oral Daily    lisinopriL  20 mg Oral Daily    metFORMIN  1,000 mg Oral BID    mirtazapine  15 mg Oral QHS    tamsulosin  0.4 mg Oral Daily         INFUSIONS         DIAGNOSTIC TESTS  No orders to display      No results found for: "EF"     NUTRITION STATUS  Patient meets ASPEN criteria for   malnutrition of   per RD assessment as evidenced by:                       A minimum of two characteristics is recommended for diagnosis of either severe or non-severe malnutrition.     Case related differential diagnoses have been reviewed; assessment and plan has been documented. I have personally reviewed the labs and test results that are currently available; I have reviewed the patients medication list. I have reviewed the consulting providers recommendations. I have reviewed or attempted to review medical records based upon their availability.  All of the patient's and/or family's questions have been addressed and answered to the best of my ability.  I will continue to monitor closely and make adjustments to medical management as needed.  This document was created using M*Modal Fluency Direct.  Transcription errors may have been made.  Please contact me if any questions may rise regarding documentation to clarify transcription.      Thairy G Reyes, DO   Internal Medicine  Department of " [facility]  [facility] - [facility]

## 2024-05-26 NOTE — PLAN OF CARE
[facility]  Discharge Reassessment    Primary Care Provider: Sami Rothman MD    Expected Discharge Date:     Reassessment (most recent)       Discharge Reassessment - 05/25/24 1947          Discharge Reassessment    Assessment Type Discharge Planning Reassessment     Did the patient's condition or plan change since previous assessment? No     Discharge Plan discussed with: Adult children     Communicated CIELO with patient/caregiver Date not available/Unable to determine     Discharge Plan A New Nursing Home placement - snf care facility     DME Needed Upon Discharge  none     Transition of Care Barriers None     Why the patient remains in the hospital Requires continued medical care        Post-Acute Status    Post-Acute Authorization Placement     Post-Acute Placement Status Referrals Sent     Hospital Resources/Appts/Education Provided Provided patient/caregiver with written discharge plan information     Discharge Delays None known at this time

## 2024-05-26 NOTE — PLAN OF CARE
Problem: Adult Inpatient Plan of Care  Goal: Plan of Care Review  Outcome: Progressing  Flowsheets (Taken 5/26/2024 0730)  Plan of Care Reviewed With: patient  Goal: Patient-Specific Goal (Individualized)  Outcome: Progressing  Flowsheets (Taken 5/26/2024 0730)  Individualized Care Needs: PT/OT, monitor blood sugar, safety  Anxieties, Fears or Concerns: denies any concerns at this time  Goal: Absence of Hospital-Acquired Illness or Injury  Outcome: Progressing  Intervention: Identify and Manage Fall Risk  Flowsheets (Taken 5/26/2024 0730)  Safety Promotion/Fall Prevention:   assistive device/personal item within reach   nonskid shoes/socks when out of bed   instructed to call staff for mobility  Goal: Optimal Comfort and Wellbeing  Outcome: Progressing  Intervention: Monitor Pain and Promote Comfort  Flowsheets (Taken 5/26/2024 0730)  Pain Management Interventions: care clustered  Intervention: Provide Person-Centered Care  Flowsheets (Taken 5/26/2024 0730)  Trust Relationship/Rapport:   care explained   questions encouraged   choices provided   reassurance provided   emotional support provided   thoughts/feelings acknowledged   empathic listening provided   questions answered  Goal: Readiness for Transition of Care  Outcome: Progressing     Problem: Diabetes Comorbidity  Goal: Blood Glucose Level Within Targeted Range  Outcome: Progressing  Intervention: Monitor and Manage Glycemia  Flowsheets (Taken 5/26/2024 0730)  Glycemic Management: blood glucose monitored     Problem: Fall Injury Risk  Goal: Absence of Fall and Fall-Related Injury  Outcome: Progressing  Intervention: Identify and Manage Contributors  Flowsheets (Taken 5/26/2024 0730)  Self-Care Promotion: independence encouraged  Medication Review/Management: medications reviewed  Intervention: Promote Injury-Free Environment  Flowsheets (Taken 5/26/2024 0730)  Safety Promotion/Fall Prevention:   assistive device/personal item within reach   nonskid  shoes/socks when out of bed   instructed to call staff for mobility     Problem: Wound  Goal: Optimal Coping  Outcome: Progressing  Intervention: Support Patient and Family Response  Flowsheets (Taken 5/26/2024 0730)  Supportive Measures: active listening utilized  Goal: Optimal Functional Ability  Outcome: Progressing  Intervention: Optimize Functional Ability  Flowsheets (Taken 5/26/2024 0730)  Activity Management: Rolling - L1  Activity Assistance Provided: independent  Goal: Absence of Infection Signs and Symptoms  Outcome: Progressing  Intervention: Prevent or Manage Infection  Flowsheets (Taken 5/26/2024 0730)  Fever Reduction/Comfort Measures:   lightweight clothing   lightweight bedding  Infection Management: aseptic technique maintained  Isolation Precautions:   protective   precautions maintained  Goal: Improved Oral Intake  Outcome: Progressing  Intervention: Promote and Optimize Oral Intake  Flowsheets (Taken 5/26/2024 0730)  Oral Nutrition Promotion: adaptive equipment use encouraged  Nutrition Interventions: food preferences provided  Goal: Optimal Pain Control and Function  Outcome: Progressing  Intervention: Prevent or Manage Pain  Flowsheets (Taken 5/26/2024 0730)  Sleep/Rest Enhancement: awakenings minimized  Complementary Therapy: acupuncture performed  Pain Management Interventions: care clustered  Goal: Skin Health and Integrity  Outcome: Progressing  Intervention: Optimize Skin Protection  Flowsheets (Taken 5/26/2024 0730)  Pressure Reduction Techniques: frequent weight shift encouraged  Pressure Reduction Devices: foam padding utilized  Activity Management: Rolling - L1  Head of Bed (HOB) Positioning: HOB elevated  Goal: Optimal Wound Healing  Outcome: Progressing     Problem: Infection  Goal: Absence of Infection Signs and Symptoms  Outcome: Progressing     Problem: Skin Injury Risk Increased  Goal: Skin Health and Integrity  Outcome: Progressing  Intervention: Optimize Skin  Protection  Flowsheets (Taken 5/26/2024 5697)  Pressure Reduction Techniques: frequent weight shift encouraged  Pressure Reduction Devices: foam padding utilized  Activity Management: Rolling - L1  Head of Bed (HOB) Positioning: HOB elevated

## 2024-05-26 NOTE — PLAN OF CARE
Problem: Diabetes Comorbidity  Goal: Blood Glucose Level Within Targeted Range  Outcome: Progressing  Intervention: Monitor and Manage Glycemia  Flowsheets (Taken 5/25/2024 2022)  Glycemic Management: blood glucose monitored     Problem: Fall Injury Risk  Goal: Absence of Fall and Fall-Related Injury  Outcome: Progressing  Intervention: Identify and Manage Contributors  Flowsheets (Taken 5/25/2024 2020)  Self-Care Promotion:   independence encouraged   BADL personal objects within reach   BADL personal routines maintained  Medication Review/Management: medications reviewed  Intervention: Promote Injury-Free Environment  Flowsheets (Taken 5/25/2024 2020)  Safety Promotion/Fall Prevention:   assistive device/personal item within reach   bed alarm set   lighting adjusted   medications reviewed   instructed to call staff for mobility

## 2024-05-27 LAB
GLUCOSE SERPL-MCNC: 130 MG/DL (ref 70–110)
GLUCOSE SERPL-MCNC: 175 MG/DL (ref 70–110)
POCT GLUCOSE: 108 MG/DL (ref 70–110)
POCT GLUCOSE: 130 MG/DL (ref 70–110)
POCT GLUCOSE: 169 MG/DL (ref 70–110)

## 2024-05-27 PROCEDURE — 97530 THERAPEUTIC ACTIVITIES: CPT

## 2024-05-27 PROCEDURE — 99900035 HC TECH TIME PER 15 MIN (STAT)

## 2024-05-27 PROCEDURE — 63600175 PHARM REV CODE 636 W HCPCS: Performed by: PHYSICIAN ASSISTANT

## 2024-05-27 PROCEDURE — 97530 THERAPEUTIC ACTIVITIES: CPT | Mod: CQ

## 2024-05-27 PROCEDURE — 94760 N-INVAS EAR/PLS OXIMETRY 1: CPT

## 2024-05-27 PROCEDURE — 97116 GAIT TRAINING THERAPY: CPT | Mod: CQ

## 2024-05-27 PROCEDURE — 25000003 PHARM REV CODE 250: Performed by: PHYSICIAN ASSISTANT

## 2024-05-27 PROCEDURE — 25000242 PHARM REV CODE 250 ALT 637 W/ HCPCS: Performed by: PHYSICIAN ASSISTANT

## 2024-05-27 PROCEDURE — 97110 THERAPEUTIC EXERCISES: CPT

## 2024-05-27 PROCEDURE — 11000004 HC SNF PRIVATE

## 2024-05-27 PROCEDURE — 97535 SELF CARE MNGMENT TRAINING: CPT

## 2024-05-27 PROCEDURE — 97110 THERAPEUTIC EXERCISES: CPT | Mod: CQ

## 2024-05-27 RX ADMIN — INSULIN ASPART 2 UNITS: 100 INJECTION, SOLUTION INTRAVENOUS; SUBCUTANEOUS at 12:05

## 2024-05-27 RX ADMIN — EMPAGLIFLOZIN 25 MG: 25 TABLET, FILM COATED ORAL at 08:05

## 2024-05-27 RX ADMIN — METFORMIN HYDROCHLORIDE 1000 MG: 500 TABLET ORAL at 08:05

## 2024-05-27 RX ADMIN — ASPIRIN 81 MG: 81 TABLET, COATED ORAL at 08:05

## 2024-05-27 RX ADMIN — ENOXAPARIN SODIUM 40 MG: 40 INJECTION SUBCUTANEOUS at 04:05

## 2024-05-27 RX ADMIN — LISINOPRIL 20 MG: 20 TABLET ORAL at 08:05

## 2024-05-27 RX ADMIN — FERROUS SULFATE TAB 325 MG (65 MG ELEMENTAL FE) 1 EACH: 325 (65 FE) TAB at 08:05

## 2024-05-27 RX ADMIN — INSULIN ASPART 1 UNITS: 100 INJECTION, SOLUTION INTRAVENOUS; SUBCUTANEOUS at 08:05

## 2024-05-27 RX ADMIN — LOPERAMIDE HYDROCHLORIDE 2 MG: 2 CAPSULE ORAL at 01:05

## 2024-05-27 RX ADMIN — CITALOPRAM HYDROBROMIDE 10 MG: 10 TABLET ORAL at 08:05

## 2024-05-27 RX ADMIN — MIRTAZAPINE 15 MG: 7.5 TABLET, FILM COATED ORAL at 08:05

## 2024-05-27 RX ADMIN — TAMSULOSIN HYDROCHLORIDE 0.4 MG: 0.4 CAPSULE ORAL at 08:05

## 2024-05-27 NOTE — PT/OT/SLP PROGRESS
Occupational Therapy  Treatment    Name: Josep Angulo    : 1950 (73 y.o.)  MRN: [id number]           TREATMENT SUMMARY AND RECOMMENDATIONS:      OT Date of Treatment: 24  OT Start Time: 900  OT Stop Time: 930  OT Total Time (min): 30 min      Subjective Assessment:   No complaints  Lethargic   x Awake, alert, cooperative  Impulsive    Uncooperative   Flat affect    Agitated  c/o pain    Appropriate  c/o fatigue    Confused  Treated at bedside     Emotionally labile x Treated in gym/dept.      Other:        Therapy Precautions:    Cognitive deficits  Spinal precautions    Collar - hard  Sternal precautions    Collar - soft   TLSO   x Fall risk  LSO    Hip precautions - posterior  Knee immobilizer    Hip precautions - anterior  WBAT    Impaired communication  Partial weightbearing    Oxygen  TTWB    PEG tube  NWB    Visual deficits      Hearing deficits   Other:        Treatment Objectives:     Mobility Training:    Mobility task Assist level Comments    Bed mobility Mod (I) Supine>EOB   Transfer SBA Modified squat/pivot t/f EOB>w/c   Sit to stands transitions     Functional mobility     Sitting balance     Standing balance      Other: w/c mobility CGA X150 feet with BUEs; CGA for steering d/t running into walls     ADL Training:    ADL Assist level Comments    Feeding     Grooming/hygiene     Bathing     Upper body dressing     Lower body dressing SBA Pt donned R shoe    Toileting     Toilet transfer     Adaptive equipment training     Other:           Therapeutic Exercise:   Exercise Sets Reps Comments   3# dowel 1 20 Shoulder press, chest press, straight arm raises, bicep curls, forward rows, backwards rows   Red flexbar 1 20  Wrist flex/ext, forearm pro/sup                   Assessment: Patient tolerated session well. Pt had positive response to today's treatment. Pt continues to make good progress towards goals. Pt would continue to benefit from skilled OT services to improve pt's safety and  independence with daily occupations, decrease caregiver burden, and reduce pt's risk of falls.     GOALS:   Multidisciplinary Problems       Occupational Therapy Goals          Problem: Occupational Therapy    Goal Priority Disciplines Outcome Interventions   Occupational Therapy Goal     OT, PT/OT Progressing    Description: Goals to be met by: 6/4/24     Patient will increase functional independence with ADLs by performing:    LE Dressing with Contact Guard Assistance.  Grooming while standing at sink with Modified [place].  Toileting from toilet with Contact Guard Assistance for hygiene and clothing management.   Bathing from  shower chair/bench with Contact Guard Assistance.  Toilet transfer to toilet with Stand-by Assistance.                         Recommendations:     Discharge Equipment Recommendations:  none     Plan:     Patient to be seen 6 x/week to address the above listed problems via self-care/home management, therapeutic activities, therapeutic exercises, wheelchair management/training  Plan of Care Expires: 06/04/24  Plan of Care Reviewed with: patient  Revisions made to plan of care: No      Skilled OT Minutes Provided: 30  Communication with Treatment Team:     Equipment recommendations:       At end of treatment, patient remained:   Up in chair     Up in wheelchair in room    In bed    With alarm activated    Bed rails up    Call bell in reach     Family/friends present    Restraints secured properly    In bathroom with CNA/RN notified   x In gym with PT/PTA/tech    Nurse aware    Other:

## 2024-05-27 NOTE — PLAN OF CARE
Problem: Adult Inpatient Plan of Care  Goal: Plan of Care Review  Outcome: Progressing  Flowsheets (Taken 5/27/2024 0730)  Plan of Care Reviewed With: patient  Goal: Patient-Specific Goal (Individualized)  Outcome: Progressing  Flowsheets (Taken 5/27/2024 0730)  Individualized Care Needs: PT/OT, DC planning  Anxieties, Fears or Concerns: denies any concerns at this time  Goal: Absence of Hospital-Acquired Illness or Injury  Outcome: Progressing  Intervention: Identify and Manage Fall Risk  Flowsheets (Taken 5/27/2024 0730)  Safety Promotion/Fall Prevention:   assistive device/personal item within reach   bed alarm set   nonskid shoes/socks when out of bed   room near unit station  Intervention: Prevent Skin Injury  Flowsheets (Taken 5/27/2024 0730)  Body Position: position changed independently  Device Skin Pressure Protection: pressure points protected  Intervention: Prevent and Manage VTE (Venous Thromboembolism) Risk  Flowsheets (Taken 5/27/2024 0730)  VTE Prevention/Management:   bleeding risk assessed   dorsiflexion/plantar flexion performed   ambulation promoted  Intervention: Prevent Infection  Flowsheets (Taken 5/27/2024 0730)  Infection Prevention:   cohorting utilized   personal protective equipment utilized   environmental surveillance performed   rest/sleep promoted   equipment surfaces disinfected   single patient room provided   hand hygiene promoted  Goal: Optimal Comfort and Wellbeing  Outcome: Progressing  Intervention: Monitor Pain and Promote Comfort  Flowsheets (Taken 5/27/2024 0730)  Pain Management Interventions: care clustered  Intervention: Provide Person-Centered Care  Flowsheets (Taken 5/27/2024 0730)  Trust Relationship/Rapport:   care explained   questions encouraged   choices provided   reassurance provided   emotional support provided   thoughts/feelings acknowledged   empathic listening provided   questions answered  Goal: Readiness for Transition of Care  Outcome: Progressing      Problem: Diabetes Comorbidity  Goal: Blood Glucose Level Within Targeted Range  Outcome: Progressing  Intervention: Monitor and Manage Glycemia  Flowsheets (Taken 5/27/2024 0730)  Glycemic Management:   blood glucose monitored   insulin dose matched to carbohydrate intake     Problem: Fall Injury Risk  Goal: Absence of Fall and Fall-Related Injury  Outcome: Progressing  Intervention: Identify and Manage Contributors  Flowsheets (Taken 5/27/2024 0730)  Self-Care Promotion: independence encouraged  Medication Review/Management: medications reviewed  Intervention: Promote Injury-Free Environment  Flowsheets (Taken 5/27/2024 0730)  Safety Promotion/Fall Prevention:   assistive device/personal item within reach   bed alarm set   nonskid shoes/socks when out of bed   room near unit station     Problem: Wound  Goal: Optimal Coping  Outcome: Progressing  Intervention: Support Patient and Family Response  Flowsheets (Taken 5/27/2024 0730)  Supportive Measures: active listening utilized  Family/Support System Care: self-care encouraged  Goal: Optimal Functional Ability  Outcome: Progressing  Intervention: Optimize Functional Ability  Flowsheets (Taken 5/27/2024 0730)  Activity Assistance Provided: assistance, 1 person  Goal: Absence of Infection Signs and Symptoms  Outcome: Progressing  Intervention: Prevent or Manage Infection  Flowsheets (Taken 5/27/2024 0730)  Infection Management: aseptic technique maintained  Isolation Precautions:   protective   precautions maintained  Goal: Improved Oral Intake  Outcome: Progressing  Intervention: Promote and Optimize Oral Intake  Flowsheets (Taken 5/27/2024 0730)  Oral Nutrition Promotion: physical activity promoted  Goal: Optimal Pain Control and Function  Outcome: Progressing  Intervention: Prevent or Manage Pain  Flowsheets (Taken 5/27/2024 0730)  Sleep/Rest Enhancement: awakenings minimized  Pain Management Interventions: care clustered  Goal: Skin Health and Integrity  Outcome:  Progressing  Intervention: Optimize Skin Protection  Flowsheets (Taken 5/27/2024 0730)  Pressure Reduction Techniques:   frequent weight shift encouraged   positioned off wounds   pressure points protected   weight shift assistance provided  Activity Management: Ambulated in room - L4  Head of Bed (HOB) Positioning: HOB at 20-30 degrees  Goal: Optimal Wound Healing  Outcome: Progressing  Intervention: Promote Wound Healing  Flowsheets (Taken 5/27/2024 0730)  Sleep/Rest Enhancement: awakenings minimized     Problem: Infection  Goal: Absence of Infection Signs and Symptoms  Outcome: Progressing  Intervention: Prevent or Manage Infection  Flowsheets (Taken 5/27/2024 0730)  Fever Reduction/Comfort Measures:   lightweight clothing   lightweight bedding  Infection Management: aseptic technique maintained  Isolation Precautions:   protective   precautions maintained     Problem: Skin Injury Risk Increased  Goal: Skin Health and Integrity  Outcome: Progressing

## 2024-05-27 NOTE — PROGRESS NOTES
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 Daughter reports patient was unable to follow commands and falling asleep easily. Upon arrival to the ER, vitals revealed elevated temperature reading and mildly elevated BP. Labs were significant for leukocytosis, hypoglycemia with a glucose of 28, elevated CRP. Patient was given a D10 infusion with improvement of glucose to 126. CXR revealed prominent interstitial markings with no focal opacification, no acute cardiopulmonary process appreciated. CT head revealed no acute intracranial abnormality identified with chronic microvascular ischemic disease. Daughter at bedside brought patient's home medication bottles. Patient had 4 bottles of Metformin 1000mg BID which daughter reports patient only takes once daily and 3 bottles of Glimepiride 2mg BID. Daughter reports she gets help from a nurse who comes to her home to organize the patient's medication therefore she does not believe he could have taken too much medication. On exam, CBG registered as <20. He did receive a one time dose of Octreotide 50mcg and D5NS at 100cc/hr. Patient is being admitted to hospital medicine service.      His glucose improved after the dose of octreotide.  D5 NS was able to be discontinued.  He is now on metformin a 1000 mg b.i.d. and tolerating well.  Blood pressure was noted to be elevated and home lisinopril has been increased to 20 mg daily starting today.  Patient is being transitioned to our swing unit for continued therapy while awaiting placement at nursing home.    Today:  No reported complaints today.  Awaiting placement.    OBJECTIVE/PHYSICAL EXAM     VITAL SIGNS (MOST RECENT):  Temp: 98.2 °F (36.8 °C) (05/27/24 0721)  Pulse: 81 (05/27/24 0721)  Resp: 18 (05/26/24 2008)  BP: (!) 154/79 (05/27/24 0721)  SpO2: 99 % (05/27/24 0721) VITAL SIGNS (24 HOUR RANGE):  Temp:  [98.2 °F (36.8 °C)-98.8 °F (37.1 °C)] 98.2 °F (36.8 °C)  Pulse:  [81-86] 81  Resp:  [18] 18  SpO2:  [98 %-99 %] 99 %  BP: (133-154)/(69-79) 154/79     GENERAL: In no acute distress, afebrile  HEENT: Atraumatic, normocephalic, moist mucous membranes   CHEST: Clear to auscultation bilaterally  HEART: S1, S2, no appreciable murmur  ABDOMEN: Soft, nontender, BS +  MSK: Warm, no lower extremity edema, left BKA   NEUROLOGIC: Alert and oriented, moving all extremities with good strength   INTEGUMENTARY: See media for right great toe   PSYCHIATRY: Appropriate mood and affect     ASSESSMENT/PLAN     Sulfonylurea induced hypoglycemia   Uncontrolled DM2   Hold home Glimepiride   Received D10 in ER, Octreotide 50mcg  A1c 6%   Continue Metformin to 1000mg BID and Jardiance 25mg QD      Acute metabolic encephalopathy - resolved   2/2 hypoglycemia and receiving sedating medications in the ER   Limit sedating medications   CT head revealed no acute intracranial abnormality identified with chronic microvascular ischemic disease     Leukocytosis - resolved   UA without UTI   CXR without acute findings     Covid, flu, RSV negative      HTN  Continue Lisinopril to 20mg on 05/21/2024     BPH  Continue home Flomax     Iron deficiency   Continue home iron supplementation     Bipolar disorder, insomnia, dementia  Continue home Mirtazapine   Daughter requesting [facility] placement at [facility] -  consulted      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   PT/OT - needs appointment with [facility] due to poorly fitted prosthetic     Hyperkalemia - resolved  Required 1 dose of Kayexalate     DVT prophylaxis:  Lovenox     Anticipated discharge and disposition: Awaiting  "placement  __________________________________________________________________________    LABS/MICRO/MEDS/DIAGNOSTICS     LABS  Recent Labs     05/25/24  0616      K 4.7   CHLORIDE 103   CO2 27   BUN 24.3   CREATININE 1.09   GLUCOSE 142*   CALCIUM 9.3     No results for input(s): "WBC", "RBC", "HCT", "MCV", "PLT" in the last 72 hours.    Invalid input(s): "HG"    MICROBIOLOGY  Microbiology Results (last 7 days)       ** No results found for the last 168 hours. **             MEDICATIONS   aspirin  81 mg Oral Daily    citalopram  10 mg Oral Daily    empagliflozin  25 mg Oral Daily    enoxparin  40 mg Subcutaneous Daily    ferrous sulfate  1 tablet Oral Daily    lisinopriL  20 mg Oral Daily    metFORMIN  1,000 mg Oral BID    mirtazapine  15 mg Oral QHS    tamsulosin  0.4 mg Oral Daily         INFUSIONS       DIAGNOSTIC TESTS  No orders to display      No results found for: "EF"     NUTRITION STATUS  Patient meets ASPEN criteria for   malnutrition of   per RD assessment as evidenced by:                       A minimum of two characteristics is recommended for diagnosis of either severe or non-severe malnutrition.     Case related differential diagnoses have been reviewed; assessment and plan has been documented. I have personally reviewed the labs and test results that are currently available; I have reviewed the patients medication list. I have reviewed the consulting providers recommendations. I have reviewed or attempted to review medical records based upon their availability.  All of the patient's and/or family's questions have been addressed and answered to the best of my ability.  I will continue to monitor closely and make adjustments to medical management as needed.  This document was created using M*Modal Fluency Direct.  Transcription errors may have been made.  Please contact me if any questions may rise regarding documentation to clarify transcription.      ALICE Hilton   Internal " Medicine  Department of Hospital Medicine [facility]

## 2024-05-27 NOTE — PT/OT/SLP PROGRESS
Physical Therapy Treatment Note           Name: Josep Angulo    : 1950 (73 y.o.)  MRN: [id number]           TREATMENT SUMMARY AND RECOMMENDATIONS:    PT Received On: 24  PT Start Time: 1330     PT Stop Time: 1350  PT Total Time (min): 20 min     Subjective Assessment:   No complaints  Lethargic   x Awake, alert, cooperative  Uncooperative    Agitated  c/o pain    Appropriate  c/o fatigue    Confused x Treated at bedside     Emotionally labile  Treated in gym/dept.    Impulsive  Other:    Flat affect       Therapy Precautions:    Cognitive deficits  Spinal precautions    Collar - hard  Sternal precautions    Collar - soft   TLSO   x Fall risk  LSO    Hip precautions - posterior  Knee immobilizer    Hip precautions - anterior  WBAT    Impaired communication  Partial weightbearing    Oxygen  TTWB    PEG tube  NWB    Visual deficits  Other:    Hearing deficits          Treatment Objectives:     Mobility Training:   Assist level Comments    Bed mobility     Transfer CGA Commode transfer x 3 with RW due to excessive BMs   Gait CGA Amb on firm surface with RW 30 ft    Sit to stand transitions     Sitting balance     Standing balance      Wheelchair mobility     Car transfer     Other:          Therapeutic Exercise:   Exercise Sets Reps Comments                               Additional Comments:  Limited treatment due to excessive BMs    Assessment: Patient tolerated session fair    PT Plan: continue  Revisions made to plan of care: No    GOALS:   Multidisciplinary Problems       Physical Therapy Goals          Problem: Physical Therapy    Goal Priority Disciplines Outcome Goal Variances Interventions   Physical Therapy Goal     PT, PT/OT Progressing     Description: Description: Goals to be met by: Discharge       Patient will increase functional independence with mobility by performin. Sit to stand transfer with Supervision  2. Bed to chair transfer with Supervision using Rolling Walker  3.  Wheelchair propulsion x 300 feet with Modified [place] using bilateral uppper extremities.   4. Gait x 50 feet at SPV levels using RW with or without prosthesis (currently poor fitting and needs  follow op)                         Skilled PT Minutes Provided: 20   Communication with Treatment Team:     Equipment recommendations:       At end of treatment, patient remained:   Up in chair     Up in wheelchair in room    In bed    With alarm activated    Bed rails up    Call bell in reach     Family/friends present    Restraints secured properly   x In bathroom with CNA/RN notified    Nurse aware    In gym with therapist/tech    Other:

## 2024-05-27 NOTE — PLAN OF CARE
Problem: Adult Inpatient Plan of Care  Goal: Plan of Care Review  Outcome: Progressing  Flowsheets (Taken 5/26/2024 2000)  Plan of Care Reviewed With: patient  Goal: Patient-Specific Goal (Individualized)  Outcome: Progressing  Flowsheets (Taken 5/26/2024 2000)  Individualized Care Needs: PT/OT, monitor blood sugars, frequent rounding, safety  Anxieties, Fears or Concerns: denies any concerns at this time  Patient/Family-Specific Goals (Include Timeframe): NH placement  Goal: Absence of Hospital-Acquired Illness or Injury  Outcome: Progressing  Intervention: Prevent Skin Injury  Flowsheets (Taken 5/26/2024 2000)  Body Position: position maintained  Goal: Optimal Comfort and Wellbeing  Outcome: Progressing  Intervention: Monitor Pain and Promote Comfort  Flowsheets (Taken 5/26/2024 2000)  Pain Management Interventions:   care clustered   quiet environment facilitated   relaxation techniques promoted  Goal: Readiness for Transition of Care  Outcome: Progressing  Intervention: Mutually Develop Transition Plan  Flowsheets (Taken 5/26/2024 2244)  Communicated CIELO with patient/caregiver: Date not available/Unable to determine  Do you expect to return to your current living situation?: No  Do you have help at home or someone to help you manage your care at home?: Yes     Problem: Diabetes Comorbidity  Goal: Blood Glucose Level Within Targeted Range  Outcome: Progressing     Problem: Fall Injury Risk  Goal: Absence of Fall and Fall-Related Injury  Outcome: Progressing  Intervention: Identify and Manage Contributors  Flowsheets (Taken 5/26/2024 2000)  Self-Care Promotion:   independence encouraged   BADL personal objects within reach   BADL personal routines maintained   adaptive equipment use encouraged     Problem: Wound  Goal: Optimal Coping  Outcome: Progressing  Intervention: Support Patient and Family Response  Flowsheets (Taken 5/26/2024 2000)  Supportive Measures: relaxation techniques promoted  Goal: Optimal  Functional Ability  Outcome: Progressing  Intervention: Optimize Functional Ability  Flowsheets (Taken 5/26/2024 2000)  Activity Assistance Provided: assistance, 1 person  Goal: Absence of Infection Signs and Symptoms  Outcome: Progressing  Goal: Improved Oral Intake  Outcome: Progressing  Goal: Optimal Pain Control and Function  Outcome: Progressing  Intervention: Prevent or Manage Pain  Flowsheets (Taken 5/26/2024 2000)  Sleep/Rest Enhancement:   awakenings minimized   relaxation techniques promoted   room darkened   noise level reduced   regular sleep/rest pattern promoted  Pain Management Interventions:   care clustered   quiet environment facilitated   relaxation techniques promoted  Goal: Skin Health and Integrity  Outcome: Progressing  Intervention: Optimize Skin Protection  Flowsheets (Taken 5/26/2024 2000)  Head of Bed (HOB) Positioning: HOB at 30-45 degrees  Goal: Optimal Wound Healing  Outcome: Progressing  Intervention: Promote Wound Healing  Flowsheets (Taken 5/26/2024 2000)  Sleep/Rest Enhancement:   awakenings minimized   relaxation techniques promoted   room darkened   noise level reduced   regular sleep/rest pattern promoted     Problem: Infection  Goal: Absence of Infection Signs and Symptoms  Outcome: Progressing     Problem: Skin Injury Risk Increased  Goal: Skin Health and Integrity  Outcome: Progressing  Intervention: Optimize Skin Protection  Flowsheets (Taken 5/26/2024 2000)  Head of Bed (HOB) Positioning: HOB at 30-45 degrees

## 2024-05-28 LAB
GLUCOSE SERPL-MCNC: 120 MG/DL (ref 70–110)
GLUCOSE SERPL-MCNC: 187 MG/DL (ref 70–110)
POCT GLUCOSE: 120 MG/DL (ref 70–110)
POCT GLUCOSE: 132 MG/DL (ref 70–110)
POCT GLUCOSE: 152 MG/DL (ref 70–110)
POCT GLUCOSE: 175 MG/DL (ref 70–110)
POCT GLUCOSE: 183 MG/DL (ref 70–110)

## 2024-05-28 PROCEDURE — 99900035 HC TECH TIME PER 15 MIN (STAT)

## 2024-05-28 PROCEDURE — 94760 N-INVAS EAR/PLS OXIMETRY 1: CPT

## 2024-05-28 PROCEDURE — 25000003 PHARM REV CODE 250: Performed by: PHYSICIAN ASSISTANT

## 2024-05-28 PROCEDURE — 97110 THERAPEUTIC EXERCISES: CPT

## 2024-05-28 PROCEDURE — 97530 THERAPEUTIC ACTIVITIES: CPT

## 2024-05-28 PROCEDURE — 97110 THERAPEUTIC EXERCISES: CPT | Mod: CQ

## 2024-05-28 PROCEDURE — 97116 GAIT TRAINING THERAPY: CPT | Mod: CQ

## 2024-05-28 PROCEDURE — 11000004 HC SNF PRIVATE

## 2024-05-28 PROCEDURE — 63600175 PHARM REV CODE 636 W HCPCS: Performed by: PHYSICIAN ASSISTANT

## 2024-05-28 PROCEDURE — 25000242 PHARM REV CODE 250 ALT 637 W/ HCPCS: Performed by: PHYSICIAN ASSISTANT

## 2024-05-28 RX ADMIN — INSULIN ASPART 2 UNITS: 100 INJECTION, SOLUTION INTRAVENOUS; SUBCUTANEOUS at 11:05

## 2024-05-28 RX ADMIN — ASPIRIN 81 MG: 81 TABLET, COATED ORAL at 08:05

## 2024-05-28 RX ADMIN — EMPAGLIFLOZIN 25 MG: 25 TABLET, FILM COATED ORAL at 08:05

## 2024-05-28 RX ADMIN — MIRTAZAPINE 15 MG: 7.5 TABLET, FILM COATED ORAL at 08:05

## 2024-05-28 RX ADMIN — METFORMIN HYDROCHLORIDE 1000 MG: 500 TABLET ORAL at 08:05

## 2024-05-28 RX ADMIN — CITALOPRAM HYDROBROMIDE 10 MG: 10 TABLET ORAL at 08:05

## 2024-05-28 RX ADMIN — ENOXAPARIN SODIUM 40 MG: 40 INJECTION SUBCUTANEOUS at 04:05

## 2024-05-28 RX ADMIN — TAMSULOSIN HYDROCHLORIDE 0.4 MG: 0.4 CAPSULE ORAL at 08:05

## 2024-05-28 RX ADMIN — FERROUS SULFATE TAB 325 MG (65 MG ELEMENTAL FE) 1 EACH: 325 (65 FE) TAB at 08:05

## 2024-05-28 RX ADMIN — INSULIN ASPART 1 UNITS: 100 INJECTION, SOLUTION INTRAVENOUS; SUBCUTANEOUS at 08:05

## 2024-05-28 RX ADMIN — LISINOPRIL 20 MG: 20 TABLET ORAL at 08:05

## 2024-05-28 NOTE — PLAN OF CARE
Problem: Adult Inpatient Plan of Care  Goal: Plan of Care Review  Outcome: Progressing  Flowsheets (Taken 5/28/2024 0730)  Plan of Care Reviewed With: patient  Goal: Patient-Specific Goal (Individualized)  Outcome: Progressing  Flowsheets (Taken 5/28/2024 0730)  Individualized Care Needs: safety, manage blood sugar & blood pressure, DC planning  Anxieties, Fears or Concerns: denies  any concerns at this time  Goal: Absence of Hospital-Acquired Illness or Injury  Outcome: Progressing  Intervention: Identify and Manage Fall Risk  Flowsheets (Taken 5/28/2024 0730)  Safety Promotion/Fall Prevention:   assistive device/personal item within reach   bed alarm set   room near unit station   nonskid shoes/socks when out of bed   instructed to call staff for mobility  Intervention: Prevent Skin Injury  Flowsheets (Taken 5/28/2024 0730)  Body Position: position changed independently  Device Skin Pressure Protection:   pressure points protected   skin-to-device areas padded   skin-to-skin areas padded   tubing/devices free from skin contact   positioning supports utilized  Intervention: Prevent and Manage VTE (Venous Thromboembolism) Risk  Flowsheets (Taken 5/28/2024 0730)  VTE Prevention/Management:   bleeding risk assessed   dorsiflexion/plantar flexion performed   ambulation promoted  Intervention: Prevent Infection  Flowsheets (Taken 5/28/2024 0730)  Infection Prevention:   cohorting utilized   personal protective equipment utilized   environmental surveillance performed   rest/sleep promoted   equipment surfaces disinfected   hand hygiene promoted   single patient room provided  Goal: Optimal Comfort and Wellbeing  Outcome: Progressing  Intervention: Monitor Pain and Promote Comfort  Flowsheets (Taken 5/28/2024 0730)  Pain Management Interventions:   care clustered   pillow support provided  Intervention: Provide Person-Centered Care  Flowsheets (Taken 5/28/2024 0730)  Trust Relationship/Rapport:   care explained    questions encouraged   choices provided   reassurance provided   emotional support provided   thoughts/feelings acknowledged   empathic listening provided   questions answered  Goal: Readiness for Transition of Care  Outcome: Progressing     Problem: Diabetes Comorbidity  Goal: Blood Glucose Level Within Targeted Range  Outcome: Progressing  Intervention: Monitor and Manage Glycemia  Flowsheets (Taken 5/28/2024 0730)  Glycemic Management:   blood glucose monitored   insulin dose matched to carbohydrate intake     Problem: Fall Injury Risk  Goal: Absence of Fall and Fall-Related Injury  Outcome: Progressing  Intervention: Identify and Manage Contributors  Flowsheets (Taken 5/28/2024 0730)  Self-Care Promotion: independence encouraged  Medication Review/Management: medications reviewed  Intervention: Promote Injury-Free Environment  Flowsheets (Taken 5/28/2024 0730)  Safety Promotion/Fall Prevention:   assistive device/personal item within reach   bed alarm set   room near unit station   nonskid shoes/socks when out of bed   instructed to call staff for mobility     Problem: Wound  Goal: Optimal Coping  Outcome: Progressing  Intervention: Support Patient and Family Response  Flowsheets (Taken 5/28/2024 0730)  Supportive Measures:   active listening utilized   self-care encouraged  Family/Support System Care:   involvement promoted   presence promoted   self-care encouraged  Goal: Optimal Functional Ability  Outcome: Progressing  Intervention: Optimize Functional Ability  Flowsheets (Taken 5/28/2024 0730)  Activity Management: Ambulated in room - L4  Activity Assistance Provided: assistance, 1 person  Goal: Absence of Infection Signs and Symptoms  Outcome: Progressing  Goal: Improved Oral Intake  Outcome: Progressing  Goal: Optimal Pain Control and Function  Outcome: Progressing  Goal: Skin Health and Integrity  Outcome: Progressing  Goal: Optimal Wound Healing  Outcome: Progressing     Problem: Infection  Goal:  Absence of Infection Signs and Symptoms  Outcome: Progressing     Problem: Skin Injury Risk Increased  Goal: Skin Health and Integrity  Outcome: Progressing

## 2024-05-28 NOTE — PLAN OF CARE
Problem: Adult Inpatient Plan of Care  Goal: Plan of Care Review  Outcome: Progressing  Flowsheets (Taken 5/27/2024 1941)  Plan of Care Reviewed With: patient  Goal: Patient-Specific Goal (Individualized)  Outcome: Progressing  Flowsheets (Taken 5/27/2024 1941)  Individualized Care Needs: safety ongoing  Anxieties, Fears or Concerns: none at this time     Problem: Diabetes Comorbidity  Goal: Blood Glucose Level Within Targeted Range  Outcome: Progressing  Intervention: Monitor and Manage Glycemia  Flowsheets (Taken 5/27/2024 1941)  Glycemic Management: blood glucose monitored     Problem: Fall Injury Risk  Goal: Absence of Fall and Fall-Related Injury  Outcome: Progressing  Intervention: Identify and Manage Contributors  Flowsheets (Taken 5/27/2024 1941)  Medication Review/Management: medications reviewed

## 2024-05-28 NOTE — PT/OT/SLP PROGRESS
Occupational Therapy  Treatment    Name: Josep Angulo    : 1950 (73 y.o.)  MRN: [id number]           TREATMENT SUMMARY AND RECOMMENDATIONS:      OT Date of Treatment: 24  OT Start Time: 930  OT Stop Time: 955  OT Total Time (min): 25 min      Subjective Assessment:   No complaints  Lethargic   x Awake, alert, cooperative  Impulsive    Uncooperative   Flat affect    Agitated  c/o pain    Appropriate  c/o fatigue    Confused  Treated at bedside     Emotionally labile x Treated in gym/dept.      Other:        Therapy Precautions:    Cognitive deficits  Spinal precautions    Collar - hard  Sternal precautions    Collar - soft   TLSO   x Fall risk  LSO    Hip precautions - posterior  Knee immobilizer    Hip precautions - anterior  WBAT    Impaired communication  Partial weightbearing    Oxygen  TTWB    PEG tube  NWB    Visual deficits      Hearing deficits   Other:        Treatment Objectives:     Mobility Training:    Mobility task Assist level Comments    Bed mobility     Transfer SBA Modified squat/pivot t/f w/c>BSchair   Sit to stands transitions SBA From w/c level    Functional mobility     Sitting balance     Standing balance  CGA Pt stood with RW anterior and performed functional reaching shoulder ROM arch in standing across midline. No LOB noted and pt able to maintain standing tolerance ~2 min each round.    Other:          Therapeutic Exercise:   Exercise Sets Reps Comments   3# dowel 1 20 Shoulder press, chest press, straight arm raises, bicep curls, forward rows, backwards rows                         Additional Comments:  Still awaiting NH placement     Assessment: Patient tolerated session well. Pt had positive response to today's treatment. Pt continues to make good progress towards goals. Pt would continue to benefit from skilled OT services to improve pt's safety and independence with daily occupations, decrease caregiver burden, and reduce pt's risk of falls.     GOALS:    Multidisciplinary Problems       Occupational Therapy Goals          Problem: Occupational Therapy    Goal Priority Disciplines Outcome Interventions   Occupational Therapy Goal     OT, PT/OT Progressing    Description: Goals to be met by: 6/4/24     Patient will increase functional independence with ADLs by performing:    LE Dressing with Contact Guard Assistance.  Grooming while standing at sink with Modified [place].  Toileting from toilet with Contact Guard Assistance for hygiene and clothing management.   Bathing from  shower chair/bench with Contact Guard Assistance.  Toilet transfer to toilet with Stand-by Assistance.                         Recommendations:     Discharge Equipment Recommendations:  none     Plan:     Patient to be seen 6 x/week to address the above listed problems via self-care/home management, therapeutic activities, therapeutic exercises, wheelchair management/training  Plan of Care Expires: 06/04/24  Plan of Care Reviewed with: patient  Revisions made to plan of care: No      Skilled OT Minutes Provided: 25  Communication with Treatment Team:     Equipment recommendations:       At end of treatment, patient remained:  x Up in chair     Up in wheelchair in room    In bed   x With alarm activated    Bed rails up   x Call bell in reach     Family/friends present    Restraints secured properly    In bathroom with CNA/RN notified    In gym with PT/PTA/tech    Nurse aware    Other:

## 2024-05-28 NOTE — PROGRESS NOTES
Inpatient Nutrition Assessment    Admit Date: 5/21/2024   Total duration of encounter: 7 days   Patient Age: 73 y.o.    Nutrition Recommendation/Prescription     Continue diabetic 2,000 kcal diet 2. Monitor intake, wt, labs, medications    Communication of Recommendations: reviewed with patient    Nutrition Assessment     Malnutrition Assessment/Nutrition-Focused Physical Exam                                                                A minimum of two characteristics is recommended for diagnosis of either severe or non-severe malnutrition.    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     Relevant Medical History: HTN, HLD, DM2, iron deficiency, bipolar disorder, insomnia, dementia, and 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     Scheduled Medications:  aspirin, 81 mg, Daily  citalopram, 10 mg, Daily  empagliflozin, 25 mg, Daily  enoxparin, 40 mg, Daily  ferrous sulfate, 1 tablet, Daily  lisinopriL, 20 mg, Daily  metFORMIN, 1,000 mg, BID  mirtazapine, 15 mg, QHS  tamsulosin, 0.4 mg, Daily    Continuous Infusions:   PRN Medications:  acetaminophen, 650 mg, Q4H PRN  albuterol-ipratropium, 3 mL, Q6H PRN  aluminum-magnesium hydroxide-simethicone, 30 mL, QID PRN  bisacodyL, 10 mg, Daily PRN  dextrose 10%, 12.5 g, PRN  dextrose 10%, 25 g, PRN  glucagon (human recombinant), 1 mg, PRN  glucose, 16 g, PRN  glucose, 24 g, PRN  guaiFENesin 100 mg/5 ml, 200 mg, Q4H PRN  hydrALAZINE, 5 mg, Q4H PRN  HYDROcodone-acetaminophen, 1  "tablet, Q6H PRN  insulin aspart U-100, 0-10 Units, QID (AC + HS) PRN  loperamide, 2 mg, Q2H PRN  melatonin, 9 mg, Nightly PRN  morphine, 2 mg, Q6H PRN  naloxone, 0.02 mg, PRN  octreotide, 50 mcg, Q6H PRN  ondansetron, 8 mg, Q8H PRN  ondansetron, 4 mg, Q8H PRN  polyethylene glycol, 17 g, TID PRN  simethicone, 1 tablet, QID PRN  sodium chloride 0.9%, 10 mL, PRN    Calorie Containing IV Medications: no significant kcals from medications at this time    Recent Labs   Lab 05/24/24  0447 05/24/24  0448 05/24/24  1541 05/25/24  0616     --  136 137   K 5.6*  --  4.8 4.7   CALCIUM 9.4  --  8.5* 9.3   CHLORIDE 103  --  101 103   CO2 28  --  25 27   BUN 26.5*  --  25.7 24.3   CREATININE 1.10  --  1.17 1.09   EGFRNORACEVR >60  --  >60 >60   GLUCOSE 106  --  107 142*   WBC  --  7.22  --   --    HGB  --  11.5*  --   --    HCT  --  35.6*  --   --      Nutrition Orders:  Diet diabetic 2000 Calorie      Appetite/Oral Intake: good/% of meals  Factors Affecting Nutritional Intake: chewing difficulty  Social Needs Impacting Access to Food: none identified  Food/Protestant/Cultural Preferences: none reported  Food Allergies: none reported  Last Bowel Movement: 05/27/24  Wound(s):      Comments  05/17/24: Pt intake is good. Will continue to monitor.      05/16/24:Pt intake is good. Pt states doesn't have dentures with him, chopping meal per patient request. Discussed food preferences. Marked menus.     05/21/24: Pt reports intake is good. Pt have no c/o of. Will continue to monitor. Chopping meal in kitchen per patient request.     05/28/24: Pt  intake is good. Discussed food preferences. Marked menus. No c/o of at this time. Will monitor. Pt reports doesn't need meal chopped anymore, has dentures with him.   Anthropometrics    Height: 5' 8" (172.7 cm), Height Method: Stated  Last Weight: 75.2 kg (165 lb 12.6 oz) (05/28/24 1520), Weight Method: Bed Scale  BMI (Calculated): 25.2  BMI Classification: overweight (BMI 25-29.9)   "      Ideal Body Weight (IBW), Male: 154 lb     % Ideal Body Weight, Male (lb): 107.66 %   Adjust IBW for amputation left BKA: 148.1 lb  Amputee BMI (kgm2) 27.18                       Usual Weight Provided By: patient denies unintentional weight loss    Wt Readings from Last 5 Encounters:   05/28/24 75.2 kg (165 lb 12.6 oz)   05/20/24 75.2 kg (165 lb 12.6 oz)   04/18/24 80.7 kg (178 lb)     Weight Change(s) Since Admission:   Wt Readings from Last 1 Encounters:   05/28/24 1520 75.2 kg (165 lb 12.6 oz)   05/27/24 0445 75.2 kg (165 lb 12.6 oz)   05/21/24 1437 75.2 kg (165 lb 12.6 oz)   05/21/24 1137 75.2 kg (165 lb 12.6 oz)   Admit Weight: 75.2 kg (165 lb 12.6 oz) (05/21/24 1137), Weight Method: Bed Scale    Estimated Needs    Weight Used For Calorie Calculations: 75.2 kg (165 lb 12.6 oz)  Energy Calorie Requirements (kcal): 1880 kcal (25 kcal/kg/CBW_  Energy Need Method: Kcal/kg  Weight Used For Protein Calculations: 75.2 kg (165 lb 12.6 oz)  Protein Requirements: 75.36 gm (1.0 gm/kg/CBW)  Fluid Requirements (mL): 1880 mL (1 mL/kcal)  CHO Requirement: 212 gm CHO per day     Enteral Nutrition     Patient not receiving enteral nutrition at this time.    Parenteral Nutrition     Patient not receiving parenteral nutrition support at this time.    Evaluation of Received Nutrient Intake    Calories: meeting estimated needs  Protein: meeting estimated needs    Patient Education     Not applicable.    Nutrition Diagnosis     PES: No nutrition diagnosis at this time    Nutrition Interventions     Intervention(s): general/healthful diet    Goal: Meet greater than 80% of nutritional needs by follow-up. (goal met)      Nutrition Goals & Monitoring     Dietitian will monitor: food and beverage intake, weight, weight change, electrolyte/renal panel, glucose/endocrine profile, and gastrointestinal profile  Discharge planning: continue diabetic 2,000 calorie diet  Nutrition Risk/Follow-Up: low (follow-up in 5-7 days)   Please consult  if re-assessment needed sooner.

## 2024-05-28 NOTE — PLAN OF CARE
Received notification from marshall with [facility] that they will accept patient when they receive financial information which the daughter is providing this afternoon. Will plan for discharge tomorrow or Thursday

## 2024-05-28 NOTE — PLAN OF CARE
[facility]  Discharge Reassessment    Primary Care Provider: Sami Rothman MD    Expected Discharge Date:     Reassessment (most recent)       Discharge Reassessment - 05/28/24 1503          Discharge Reassessment    Assessment Type Discharge Planning Reassessment     Did the patient's condition or plan change since previous assessment? No     Discharge Plan discussed with: Adult children     Discharge Plan A [facility] Nursing Home placement - [facility]     DME Needed Upon Discharge  none     Transition of Care Barriers None     Why the patient remains in the hospital Placement issues        Post-Acute Status    Post-Acute Placement Status Referrals Sent     Discharge Delays None known at this time

## 2024-05-28 NOTE — PT/OT/SLP PROGRESS
Physical Therapy Treatment Note           Name: Josep Angulo    : 1950 (73 y.o.)  MRN: [id number]           TREATMENT SUMMARY AND RECOMMENDATIONS:    PT Received On: 24  PT Start Time: 900     PT Stop Time: 925  PT Total Time (min): 25 min     Subjective Assessment:  x No complaints  Lethargic    Awake, alert, cooperative  Uncooperative    Agitated  c/o pain    Appropriate  c/o fatigue    Confused  Treated at bedside     Emotionally labile  Treated in gym/dept.    Impulsive  Other:    Flat affect       Therapy Precautions:    Cognitive deficits  Spinal precautions    Collar - hard  Sternal precautions    Collar - soft   TLSO   x Fall risk  LSO    Hip precautions - posterior  Knee immobilizer    Hip precautions - anterior  WBAT    Impaired communication  Partial weightbearing    Oxygen  TTWB    PEG tube  NWB    Visual deficits  Other:    Hearing deficits          Treatment Objectives:     Mobility Training:   Assist level Comments    Bed mobility     Transfer     Gait CGA Amb on firm surface with RW 30 ft     Sit to stand  CGA Sit-stand 5 reps x 2 with B UE use   Sitting balance     Standing balance      Wheelchair mobility     Car transfer     Other:          Therapeutic Exercise:   Exercise Sets Reps Comments   B LE exer sitting  1 20 In all planes to promote muscle strength and ROM    L LE exer standing 2 10 B UE supported- abd/add, knee lifts                    Additional Comments:  No issues noted      Assessment: Patient tolerated session well.    PT Plan: continue  Revisions made to plan of care: No    GOALS:   Multidisciplinary Problems       Physical Therapy Goals          Problem: Physical Therapy    Goal Priority Disciplines Outcome Goal Variances Interventions   Physical Therapy Goal     PT, PT/OT Progressing     Description: Description: Goals to be met by: Discharge       Patient will increase functional independence with mobility by performin. Sit to stand transfer  with Supervision  2. Bed to chair transfer with Supervision using Rolling Walker  3. Wheelchair propulsion x 300 feet with Modified [place] using bilateral uppper extremities.   4. Gait x 50 feet at SPV levels using RW with or without prosthesis (currently poor fitting and needs  follow op)                         Skilled PT Minutes Provided: 25   Communication with Treatment Team:     Equipment recommendations:       At end of treatment, patient remained:   Up in chair     Up in wheelchair in room    In bed    With alarm activated    Bed rails up    Call bell in reach     Family/friends present    Restraints secured properly    In bathroom with CNA/RN notified    Nurse aware   x In gym with therapist/tech    Other:

## 2024-05-28 NOTE — PROGRESS NOTES
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 Daughter reports patient was unable to follow commands and falling asleep easily. Upon arrival to the ER, vitals revealed elevated temperature reading and mildly elevated BP. Labs were significant for leukocytosis, hypoglycemia with a glucose of 28, elevated CRP. Patient was given a D10 infusion with improvement of glucose to 126. CXR revealed prominent interstitial markings with no focal opacification, no acute cardiopulmonary process appreciated. CT head revealed no acute intracranial abnormality identified with chronic microvascular ischemic disease. Daughter at bedside brought patient's home medication bottles. Patient had 4 bottles of Metformin 1000mg BID which daughter reports patient only takes once daily and 3 bottles of Glimepiride 2mg BID. Daughter reports she gets help from a nurse who comes to her home to organize the patient's medication therefore she does not believe he could have taken too much medication. On exam, CBG registered as <20. He did receive a one time dose of Octreotide 50mcg and D5NS at 100cc/hr. Patient is being admitted to hospital medicine service.      His glucose improved after the dose of octreotide.  D5 NS was able to be discontinued.  He is now on metformin a 1000 mg b.i.d. and tolerating well.  Blood pressure was noted to be elevated and home lisinopril has been increased to 20 mg daily starting today.  Patient is being transitioned to our swing unit for continued therapy while awaiting placement at nursing home.    Today:  No reported complaints today.  Patient reports  [facility] staff is coming evaluate him today.     OBJECTIVE/PHYSICAL EXAM     VITAL SIGNS (MOST RECENT):  Temp: 97.9 °F (36.6 °C) (05/28/24 0716)  Pulse: 79 (05/28/24 0716)  Resp: 18 (05/27/24 1917)  BP: (!) 167/84 (05/28/24 0716)  SpO2: 99 % (05/28/24 0716) VITAL SIGNS (24 HOUR RANGE):  Temp:  [97.8 °F (36.6 °C)-97.9 °F (36.6 °C)] 97.9 °F (36.6 °C)  Pulse:  [79-83] 79  Resp:  [18] 18  SpO2:  [99 %] 99 %  BP: (128-167)/(76-84) 167/84     GENERAL: In no acute distress, afebrile  HEENT: Atraumatic, normocephalic, moist mucous membranes   CHEST: Clear to auscultation bilaterally  HEART: S1, S2, no appreciable murmur  ABDOMEN: Soft, nontender, BS +  MSK: Warm, no lower extremity edema, left BKA   NEUROLOGIC: Alert and oriented, moving all extremities with good strength   INTEGUMENTARY: See media for right great toe   PSYCHIATRY: Appropriate mood and affect     ASSESSMENT/PLAN     Sulfonylurea induced hypoglycemia   Uncontrolled DM2   Hold home Glimepiride   Received D10 in ER, Octreotide 50mcg  A1c 6%   Continue Metformin to 1000mg BID and Jardiance 25mg QD      Acute metabolic encephalopathy - resolved   2/2 hypoglycemia and receiving sedating medications in the ER   Limit sedating medications   CT head revealed no acute intracranial abnormality identified with chronic microvascular ischemic disease     Leukocytosis - resolved   UA without UTI   CXR without acute findings     Covid, flu, RSV negative      HTN  Continue Lisinopril to 20mg on 05/21/2024     BPH  Continue home Flomax     Iron deficiency   Continue home iron supplementation     Bipolar disorder, insomnia, dementia  Continue home Mirtazapine   Daughter requesting NH placement at [facility] - [facility] consulted      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   PT/OT - needs appointment with [facility] due to poorly fitted prosthetic     Hyperkalemia - resolved  Required 1 dose of Kayexalate     DVT prophylaxis:  Lovenox     Anticipated discharge and  "disposition: Awaiting placement  __________________________________________________________________________    LABS/MICRO/MEDS/DIAGNOSTICS     LABS  No results for input(s): "NA", "K", "CHLORIDE", "CO2", "BUN", "CREATININE", "GLUCOSE", "CALCIUM", "ALKPHOS", "AST", "ALT", "ALBUMIN" in the last 72 hours.    No results for input(s): "WBC", "RBC", "HCT", "MCV", "PLT" in the last 72 hours.    Invalid input(s): "HG"    MICROBIOLOGY  Microbiology Results (last 7 days)       ** No results found for the last 168 hours. **             MEDICATIONS   aspirin  81 mg Oral Daily    citalopram  10 mg Oral Daily    empagliflozin  25 mg Oral Daily    enoxparin  40 mg Subcutaneous Daily    ferrous sulfate  1 tablet Oral Daily    lisinopriL  20 mg Oral Daily    metFORMIN  1,000 mg Oral BID    mirtazapine  15 mg Oral QHS    tamsulosin  0.4 mg Oral Daily         INFUSIONS       DIAGNOSTIC TESTS  No orders to display      No results found for: "EF"     NUTRITION STATUS  Patient meets ASPEN criteria for   malnutrition of   per RD assessment as evidenced by:                       A minimum of two characteristics is recommended for diagnosis of either severe or non-severe malnutrition.     Case related differential diagnoses have been reviewed; assessment and plan has been documented. I have personally reviewed the labs and test results that are currently available; I have reviewed the patients medication list. I have reviewed the consulting providers recommendations. I have reviewed or attempted to review medical records based upon their availability.  All of the patient's and/or family's questions have been addressed and answered to the best of my ability.  I will continue to monitor closely and make adjustments to medical management as needed.  This document was created using [employer] Fluency Direct.  Transcription errors may have been made.  Please contact me if any questions may rise regarding documentation to clarify transcription.  "     ALICE Hilton   Internal Medicine  Department of Hospital Medicine [facility]

## 2024-05-29 LAB
GLUCOSE SERPL-MCNC: 104 MG/DL (ref 70–110)
GLUCOSE SERPL-MCNC: 156 MG/DL (ref 70–110)
POCT GLUCOSE: 104 MG/DL (ref 70–110)
POCT GLUCOSE: 133 MG/DL (ref 70–110)
POCT GLUCOSE: 156 MG/DL (ref 70–110)

## 2024-05-29 PROCEDURE — 97530 THERAPEUTIC ACTIVITIES: CPT | Mod: CQ

## 2024-05-29 PROCEDURE — 11000004 HC SNF PRIVATE

## 2024-05-29 PROCEDURE — 97535 SELF CARE MNGMENT TRAINING: CPT

## 2024-05-29 PROCEDURE — 97116 GAIT TRAINING THERAPY: CPT | Mod: CQ

## 2024-05-29 PROCEDURE — 25000003 PHARM REV CODE 250: Performed by: PHYSICIAN ASSISTANT

## 2024-05-29 PROCEDURE — 97110 THERAPEUTIC EXERCISES: CPT

## 2024-05-29 PROCEDURE — 25000242 PHARM REV CODE 250 ALT 637 W/ HCPCS: Performed by: PHYSICIAN ASSISTANT

## 2024-05-29 PROCEDURE — 63600175 PHARM REV CODE 636 W HCPCS: Performed by: PHYSICIAN ASSISTANT

## 2024-05-29 PROCEDURE — 94760 N-INVAS EAR/PLS OXIMETRY 1: CPT

## 2024-05-29 PROCEDURE — 99900035 HC TECH TIME PER 15 MIN (STAT)

## 2024-05-29 RX ADMIN — METFORMIN HYDROCHLORIDE 1000 MG: 500 TABLET ORAL at 08:05

## 2024-05-29 RX ADMIN — CITALOPRAM HYDROBROMIDE 10 MG: 10 TABLET ORAL at 08:05

## 2024-05-29 RX ADMIN — FERROUS SULFATE TAB 325 MG (65 MG ELEMENTAL FE) 1 EACH: 325 (65 FE) TAB at 08:05

## 2024-05-29 RX ADMIN — ASPIRIN 81 MG: 81 TABLET, COATED ORAL at 08:05

## 2024-05-29 RX ADMIN — ENOXAPARIN SODIUM 40 MG: 40 INJECTION SUBCUTANEOUS at 05:05

## 2024-05-29 RX ADMIN — EMPAGLIFLOZIN 25 MG: 25 TABLET, FILM COATED ORAL at 08:05

## 2024-05-29 RX ADMIN — TAMSULOSIN HYDROCHLORIDE 0.4 MG: 0.4 CAPSULE ORAL at 08:05

## 2024-05-29 RX ADMIN — INSULIN ASPART 1 UNITS: 100 INJECTION, SOLUTION INTRAVENOUS; SUBCUTANEOUS at 08:05

## 2024-05-29 RX ADMIN — LISINOPRIL 20 MG: 20 TABLET ORAL at 08:05

## 2024-05-29 RX ADMIN — MIRTAZAPINE 15 MG: 7.5 TABLET, FILM COATED ORAL at 08:05

## 2024-05-29 NOTE — PT/OT/SLP PROGRESS
Name: Josep Angulo    : 1950 (73 y.o.)  MRN: [id number]            Interdisciplinary Team Conference     Case conference held with patient/family and care team to discuss progress, plan of care, barriers to be addressed for safe return home, equipment recommendations, and discharge planning. Communicated therapy progress with MD, RN, therapy clinicians and case management. All questions/concerns answered.

## 2024-05-29 NOTE — NURSING
Spoke with the patient's daughter, Nellie, and informed her that the patient has one more dose of Jardiance, which will be for Thursday, May 30.  Daughter verbalized understanding.

## 2024-05-29 NOTE — PT/OT/SLP PROGRESS
Occupational Therapy  Treatment    Name: Josep Angulo    : 1950 (73 y.o.)  MRN: [id number]           TREATMENT SUMMARY AND RECOMMENDATIONS:      OT Date of Treatment: 24  OT Start Time: 955  OT Stop Time:   OT Total Time (min): 25 min      Subjective Assessment:   No complaints  Lethargic   x Awake, alert, cooperative  Impulsive    Uncooperative   Flat affect    Agitated  c/o pain    Appropriate  c/o fatigue    Confused  Treated at bedside     Emotionally labile x Treated in gym/dept.      Other:        Therapy Precautions:    Cognitive deficits  Spinal precautions    Collar - hard  Sternal precautions    Collar - soft   TLSO   x Fall risk  LSO    Hip precautions - posterior  Knee immobilizer    Hip precautions - anterior  WBAT    Impaired communication  Partial weightbearing    Oxygen  TTWB    PEG tube  NWB    Visual deficits      Hearing deficits   Other:        Treatment Objectives:     Mobility Training:    Mobility task Assist level Comments    Bed mobility     Transfer SBA Modified squat/pivot t/f w/c>BSchair   Sit to stands transitions     Functional mobility     Sitting balance     Standing balance      Other:        Therapeutic Exercise:   Exercise Sets Reps Comments   3# dowel 1 20 Shoulder press, chest press, straight arm raises, bicep curls, forward rows, backwards rows   Arm bike 1 6 min Level 1; F/B   Sit to stands 1 5  SBA from w/c level              Assessment: Patient tolerated session well. Pt had positive response to today's treatment. Pt continues to make good progress towards goals. Pt would continue to benefit from skilled OT services to improve pt's safety and independence with daily occupations, decrease caregiver burden, and reduce pt's risk of falls. Plan to DC to NH tomorrow.     GOALS:   Multidisciplinary Problems       Occupational Therapy Goals          Problem: Occupational Therapy    Goal Priority Disciplines Outcome Interventions   Occupational Therapy Goal      OT, PT/OT Progressing    Description: Goals to be met by: 6/4/24     Patient will increase functional independence with ADLs by performing:    LE Dressing with Contact Guard Assistance.  Grooming while standing at sink with Modified [place].  Toileting from toilet with Contact Guard Assistance for hygiene and clothing management.   Bathing from  shower chair/bench with Contact Guard Assistance.  Toilet transfer to toilet with Stand-by Assistance.                         Recommendations:     Discharge Equipment Recommendations:  none     Plan:     Patient to be seen 6 x/week to address the above listed problems via self-care/home management, therapeutic activities, therapeutic exercises, wheelchair management/training  Plan of Care Expires: 06/04/24  Plan of Care Reviewed with: patient  Revisions made to plan of care: No      Skilled OT Minutes Provided: 25  Communication with Treatment Team:     Equipment recommendations:       At end of treatment, patient remained:  x Up in chair     Up in wheelchair in room    In bed   x With alarm activated    Bed rails up   x Call bell in reach     Family/friends present    Restraints secured properly    In bathroom with CNA/RN notified    In gym with PT/PTA/tech    Nurse aware    Other:

## 2024-05-29 NOTE — PLAN OF CARE
Problem: Adult Inpatient Plan of Care  Goal: Plan of Care Review  Outcome: Progressing  Goal: Patient-Specific Goal (Individualized)  Outcome: Progressing  Goal: Absence of Hospital-Acquired Illness or Injury  Outcome: Progressing  Goal: Optimal Comfort and Wellbeing  Outcome: Progressing  Goal: Readiness for Transition of Care  Outcome: Progressing     Problem: Diabetes Comorbidity  Goal: Blood Glucose Level Within Targeted Range  Outcome: Progressing     Problem: Fall Injury Risk  Goal: Absence of Fall and Fall-Related Injury  Outcome: Progressing  Intervention: Identify and Manage Contributors  Flowsheets (Taken 5/29/2024 1008)  Self-Care Promotion:   independence encouraged   BADL personal objects within reach   BADL personal routines maintained  Medication Review/Management:   medications reviewed   high-risk medications identified  Intervention: Promote Injury-Free Environment  Flowsheets (Taken 5/29/2024 1008)  Safety Promotion/Fall Prevention:   assistive device/personal item within reach   bed alarm set   Fall Risk reviewed with patient/family   Fall Risk signage in place   instructed to call staff for mobility   other (see comments)     Problem: Wound  Goal: Optimal Coping  Outcome: Progressing  Goal: Optimal Functional Ability  Outcome: Progressing  Goal: Absence of Infection Signs and Symptoms  Outcome: Progressing  Goal: Improved Oral Intake  Outcome: Progressing  Goal: Optimal Pain Control and Function  Outcome: Progressing  Goal: Skin Health and Integrity  Outcome: Progressing  Goal: Optimal Wound Healing  Outcome: Progressing     Problem: Infection  Goal: Absence of Infection Signs and Symptoms  Outcome: Progressing     Problem: Skin Injury Risk Increased  Goal: Skin Health and Integrity  Outcome: Progressing

## 2024-05-29 NOTE — PT/OT/SLP PROGRESS
Physical Therapy Treatment Note           Name: Josep Angulo    : 1950 (73 y.o.)  MRN: [id number]           TREATMENT SUMMARY AND RECOMMENDATIONS:    PT Received On: 24  PT Start Time: 930     PT Stop Time: 955  PT Total Time (min): 25 min     Subjective Assessment:  x No complaints  Lethargic    Awake, alert, cooperative  Uncooperative    Agitated  c/o pain    Appropriate  c/o fatigue    Confused  Treated at bedside     Emotionally labile  Treated in gym/dept.    Impulsive  Other:    Flat affect       Therapy Precautions:    Cognitive deficits  Spinal precautions    Collar - hard  Sternal precautions    Collar - soft   TLSO    Fall risk  LSO    Hip precautions - posterior  Knee immobilizer    Hip precautions - anterior  WBAT    Impaired communication  Partial weightbearing    Oxygen  TTWB    PEG tube  NWB    Visual deficits  Other:    Hearing deficits          Treatment Objectives:     Mobility Training:   Assist level Comments    Bed mobility     Transfer CGA Commode transfer with RW   Gait CGA Amb on firm surface with RW 80 ft    Sit to stand transitions CGA Sit-stand 5 reps with B UE use   Sitting balance     Standing balance      Wheelchair mobility     Car transfer     Other:          Therapeutic Exercise:   Exercise Sets Reps Comments                               Additional Comments:  No issues noted     Assessment: Patient tolerated session well    PT Plan: continue  Revisions made to plan of care: No    GOALS:   Multidisciplinary Problems       Physical Therapy Goals          Problem: Physical Therapy    Goal Priority Disciplines Outcome Goal Variances Interventions   Physical Therapy Goal     PT, PT/OT Progressing     Description: Description: Goals to be met by: Discharge       Patient will increase functional independence with mobility by performin. Sit to stand transfer with Supervision  2. Bed to chair transfer with Supervision using Rolling Walker  3. Wheelchair  propulsion x 300 feet with Modified [place] using bilateral uppper extremities.   4. Gait x 50 feet at SPV levels using RW with or without prosthesis (currently poor fitting and needs  follow op)                         Skilled PT Minutes Provided: 25   Communication with Treatment Team:     Equipment recommendations:       At end of treatment, patient remained:   Up in chair     Up in wheelchair in room    In bed    With alarm activated    Bed rails up    Call bell in reach     Family/friends present    Restraints secured properly    In bathroom with CNA/RN notified    Nurse aware   x In gym with therapist/tech    Other:

## 2024-05-29 NOTE — PLAN OF CARE
Problem: Adult Inpatient Plan of Care  Goal: Plan of Care Review  Outcome: Progressing  Flowsheets (Taken 5/28/2024 1950)  Plan of Care Reviewed With: patient  Goal: Patient-Specific Goal (Individualized)  Outcome: Progressing  Flowsheets (Taken 5/28/2024 1950)  Individualized Care Needs: safety and blood sugar checkes ongoing  Anxieties, Fears or Concerns: denies any at this time     Problem: Diabetes Comorbidity  Goal: Blood Glucose Level Within Targeted Range  Outcome: Progressing  Intervention: Monitor and Manage Glycemia  Flowsheets (Taken 5/28/2024 1950)  Glycemic Management: blood glucose monitored     Problem: Fall Injury Risk  Goal: Absence of Fall and Fall-Related Injury  Outcome: Progressing  Intervention: Identify and Manage Contributors  Flowsheets (Taken 5/28/2024 1950)  Medication Review/Management: medications reviewed

## 2024-05-30 LAB
GLUCOSE SERPL-MCNC: 118 MG/DL (ref 70–110)
POCT GLUCOSE: 118 MG/DL (ref 70–110)
POCT GLUCOSE: 133 MG/DL (ref 70–110)
POCT GLUCOSE: 168 MG/DL (ref 70–110)

## 2024-05-30 PROCEDURE — 25000003 PHARM REV CODE 250: Performed by: PHYSICIAN ASSISTANT

## 2024-05-30 PROCEDURE — 11000004 HC SNF PRIVATE

## 2024-05-30 PROCEDURE — 63600175 PHARM REV CODE 636 W HCPCS: Performed by: PHYSICIAN ASSISTANT

## 2024-05-30 PROCEDURE — 97110 THERAPEUTIC EXERCISES: CPT

## 2024-05-30 PROCEDURE — 99900035 HC TECH TIME PER 15 MIN (STAT)

## 2024-05-30 PROCEDURE — 25000242 PHARM REV CODE 250 ALT 637 W/ HCPCS: Performed by: PHYSICIAN ASSISTANT

## 2024-05-30 PROCEDURE — 97116 GAIT TRAINING THERAPY: CPT | Mod: CQ

## 2024-05-30 PROCEDURE — 94760 N-INVAS EAR/PLS OXIMETRY 1: CPT

## 2024-05-30 RX ADMIN — ASPIRIN 81 MG: 81 TABLET, COATED ORAL at 09:05

## 2024-05-30 RX ADMIN — LISINOPRIL 20 MG: 20 TABLET ORAL at 09:05

## 2024-05-30 RX ADMIN — METFORMIN HYDROCHLORIDE 1000 MG: 500 TABLET ORAL at 08:05

## 2024-05-30 RX ADMIN — FERROUS SULFATE TAB 325 MG (65 MG ELEMENTAL FE) 1 EACH: 325 (65 FE) TAB at 09:05

## 2024-05-30 RX ADMIN — LOPERAMIDE HYDROCHLORIDE 2 MG: 2 CAPSULE ORAL at 02:05

## 2024-05-30 RX ADMIN — CITALOPRAM HYDROBROMIDE 10 MG: 10 TABLET ORAL at 09:05

## 2024-05-30 RX ADMIN — TAMSULOSIN HYDROCHLORIDE 0.4 MG: 0.4 CAPSULE ORAL at 09:05

## 2024-05-30 RX ADMIN — METFORMIN HYDROCHLORIDE 1000 MG: 500 TABLET ORAL at 09:05

## 2024-05-30 RX ADMIN — INSULIN ASPART 2 UNITS: 100 INJECTION, SOLUTION INTRAVENOUS; SUBCUTANEOUS at 04:05

## 2024-05-30 RX ADMIN — MIRTAZAPINE 15 MG: 7.5 TABLET, FILM COATED ORAL at 08:05

## 2024-05-30 RX ADMIN — ENOXAPARIN SODIUM 40 MG: 40 INJECTION SUBCUTANEOUS at 04:05

## 2024-05-30 RX ADMIN — EMPAGLIFLOZIN 25 MG: 25 TABLET, FILM COATED ORAL at 09:05

## 2024-05-30 NOTE — PLAN OF CARE
Problem: Adult Inpatient Plan of Care  Goal: Plan of Care Review  Outcome: Progressing  Goal: Patient-Specific Goal (Individualized)  Outcome: Progressing  Goal: Absence of Hospital-Acquired Illness or Injury  Outcome: Progressing  Goal: Optimal Comfort and Wellbeing  Outcome: Progressing  Goal: Readiness for Transition of Care  Outcome: Progressing     Problem: Adult Inpatient Plan of Care  Goal: Plan of Care Review  Outcome: Progressing  Goal: Patient-Specific Goal (Individualized)  Outcome: Progressing  Goal: Absence of Hospital-Acquired Illness or Injury  Outcome: Progressing  Goal: Optimal Comfort and Wellbeing  Outcome: Progressing  Goal: Readiness for Transition of Care  Outcome: Progressing

## 2024-05-30 NOTE — PT/OT/SLP PROGRESS
Physical Therapy Treatment Note           Name: Josep Angulo    : 1950 (73 y.o.)  MRN: [id number]           TREATMENT SUMMARY AND RECOMMENDATIONS:    PT Received On: 24  PT Start Time: 1300     PT Stop Time: 1315  PT Total Time (min): 15 min     Subjective Assessment:  x No complaints  Lethargic    Awake, alert, cooperative  Uncooperative    Agitated  c/o pain    Appropriate  c/o fatigue    Confused  Treated at bedside     Emotionally labile  Treated in gym/dept.    Impulsive  Other:    Flat affect       Therapy Precautions:    Cognitive deficits  Spinal precautions    Collar - hard  Sternal precautions    Collar - soft   TLSO    Fall risk  LSO    Hip precautions - posterior  Knee immobilizer    Hip precautions - anterior  WBAT    Impaired communication  Partial weightbearing    Oxygen  TTWB    PEG tube  NWB    Visual deficits  Other:    Hearing deficits          Treatment Objectives:     Mobility Training:   Assist level Comments    Bed mobility     Transfer     Gait CGA Amb on firm surface with RW 30 ftx  2    Sit to stand transitions CGA Sit-stand with B UE use   Sitting balance     Standing balance      Wheelchair mobility     Car transfer     Other:          Therapeutic Exercise:   Exercise Sets Reps Comments                               Additional Comments:  No issues noted     Assessment: Patient tolerated session well    PT Plan: awaiting NH placement  Revisions made to plan of care: No    GOALS:   Multidisciplinary Problems       Physical Therapy Goals          Problem: Physical Therapy    Goal Priority Disciplines Outcome Goal Variances Interventions   Physical Therapy Goal     PT, PT/OT Progressing     Description: Description: Goals to be met by: Discharge       Patient will increase functional independence with mobility by performin. Sit to stand transfer with Supervision  2. Bed to chair transfer with Supervision using Rolling Walker  3. Wheelchair propulsion x 300 feet  with Modified [place] using bilateral uppper extremities.   4. Gait x 50 feet at SPV levels using RW with or without prosthesis (currently poor fitting and needs  follow op)                         Skilled PT Minutes Provided: 15   Communication with Treatment Team:     Equipment recommendations:       At end of treatment, patient remained:  x Up in chair     Up in wheelchair in room    In bed   x With alarm activated    Bed rails up   x Call bell in reach     Family/friends present    Restraints secured properly    In bathroom with CNA/RN notified    Nurse aware    In gym with therapist/tech    Other:

## 2024-05-30 NOTE — PROGRESS NOTES
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 Daughter reports patient was unable to follow commands and falling asleep easily. Upon arrival to the ER, vitals revealed elevated temperature reading and mildly elevated BP. Labs were significant for leukocytosis, hypoglycemia with a glucose of 28, elevated CRP. Patient was given a D10 infusion with improvement of glucose to 126. CXR revealed prominent interstitial markings with no focal opacification, no acute cardiopulmonary process appreciated. CT head revealed no acute intracranial abnormality identified with chronic microvascular ischemic disease. Daughter at bedside brought patient's home medication bottles. Patient had 4 bottles of Metformin 1000mg BID which daughter reports patient only takes once daily and 3 bottles of Glimepiride 2mg BID. Daughter reports she gets help from a nurse who comes to her home to organize the patient's medication therefore she does not believe he could have taken too much medication. On exam, CBG registered as <20. He did receive a one time dose of Octreotide 50mcg and D5NS at 100cc/hr. Patient is being admitted to hospital medicine service.      His glucose improved after the dose of octreotide.  D5 NS was able to be discontinued.  He is now on metformin a 1000 mg b.i.d. and tolerating well.  Blood pressure was noted to be elevated and home lisinopril has been increased to 20 mg daily starting today.  Patient is  being transitioned to our swing unit for continued therapy while awaiting placement at nursing home.      Subjective:     Principal Problem:Hypoglycemia associated with diabetes    Interval History:   Doing well this am. No new complaints     Review of Systems   All other systems reviewed and are negative.    Objective:     Vital Signs (Most Recent):  Temp: 97.7 °F (36.5 °C) (05/29/24 1940)  Pulse: 82 (05/29/24 1940)  Resp: 16 (05/30/24 0103)  BP: (!) 144/75 (05/29/24 1940)  SpO2: 96 % (05/29/24 1942) Vital Signs (24h Range):  Temp:  [97.7 °F (36.5 °C)-98 °F (36.7 °C)] 97.7 °F (36.5 °C)  Pulse:  [82-87] 82  Resp:  [16] 16  SpO2:  [96 %-100 %] 96 %  BP: (144-146)/(75-84) 144/75     Weight: 75.2 kg (165 lb 12.6 oz)  Body mass index is 25.21 kg/m².    Intake/Output Summary (Last 24 hours) at 5/30/2024 0717  Last data filed at 5/30/2024 0555  Gross per 24 hour   Intake 360 ml   Output 2100 ml   Net -1740 ml      Physical Exam  Vitals reviewed. Exam conducted with a chaperone present.   Constitutional:       Appearance: Normal appearance. He is normal weight.   HENT:      Head: Normocephalic and atraumatic.      Nose: Nose normal.      Mouth/Throat:      Mouth: Mucous membranes are moist.      Pharynx: Oropharynx is clear.   Eyes:      Extraocular Movements: Extraocular movements intact.      Conjunctiva/sclera: Conjunctivae normal.      Pupils: Pupils are equal, round, and reactive to light.   Cardiovascular:      Rate and Rhythm: Normal rate and regular rhythm.      Pulses: Normal pulses.      Heart sounds: Normal heart sounds.   Pulmonary:      Effort: Pulmonary effort is normal.      Breath sounds: Normal breath sounds.   Abdominal:      General: Abdomen is flat. Bowel sounds are normal.      Palpations: Abdomen is soft.   Musculoskeletal:         General: Normal range of motion.      Cervical back: Normal range of motion.   Skin:     General: Skin is warm.   Neurological:      General: No focal deficit present.       Mental Status: He is alert. Mental status is at baseline.         Significant Labs: All pertinent labs within the past 24 hours have been reviewed.    Significant Imaging: I have reviewed all pertinent imaging results/findings within the past 24 hours.    Assessment/Plan:      Active Diagnoses:    Diagnosis Date Noted POA    PRINCIPAL PROBLEM:  Hypoglycemia associated with diabetes [E11.649] 05/13/2024 Yes      Problems Resolved During this Admission:     VTE Risk Mitigation (From admission, onward)           Ordered     enoxaparin injection 40 mg  Daily         05/21/24 1239     IP VTE HIGH RISK PATIENT  Once         05/21/24 1239     Place sequential compression device  Until discontinued         05/21/24 1239                  Sulfonylurea induced hypoglycemia   Uncontrolled DM2   Hold home Glimepiride   Received D10 in ER, Octreotide 50mcg  A1c 6%   Continue Metformin to 1000mg BID and Jardiance 25mg QD      Acute metabolic encephalopathy - resolved   2/2 hypoglycemia and receiving sedating medications in the ER   Limit sedating medications   CT head revealed no acute intracranial abnormality identified with chronic microvascular ischemic disease     Leukocytosis - resolved   UA without UTI   CXR without acute findings     Covid, flu, RSV negative      HTN  Continue Lisinopril to 20mg on 05/21/2024     BPH  Continue home Flomax     Iron deficiency   Continue home iron supplementation     Bipolar disorder, insomnia, dementia  Continue home Mirtazapine   Daughter requesting NH placement at [facility] -  consulted      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   PT/OT - needs appointment with [facility] due to poorly fitted prosthetic      Hyperkalemia - resolved  Required 1 dose of Kayexalate     DVT prophylaxis:  Lovenox      Services provided via two way audio/visual telecommunication  Provider location: [place], [place]  Patient location: [place]     Agapito Yates MD  Department of Hospital Medicine   [facility]  [place] [facility]

## 2024-05-30 NOTE — PLAN OF CARE
Problem: Adult Inpatient Plan of Care  Goal: Plan of Care Review  Outcome: Progressing  Flowsheets (Taken 5/29/2024 2002)  Plan of Care Reviewed With: patient  Goal: Patient-Specific Goal (Individualized)  Outcome: Progressing  Flowsheets (Taken 5/29/2024 2002)  Individualized Care Needs: safety and bl glucose checks ongoing  Anxieties, Fears or Concerns: none expressed     Problem: Diabetes Comorbidity  Goal: Blood Glucose Level Within Targeted Range  Outcome: Progressing  Intervention: Monitor and Manage Glycemia  Flowsheets (Taken 5/29/2024 2002)  Glycemic Management: blood glucose monitored     Problem: Fall Injury Risk  Goal: Absence of Fall and Fall-Related Injury  Outcome: Progressing  Intervention: Identify and Manage Contributors  Flowsheets (Taken 5/29/2024 2002)  Medication Review/Management: medications reviewed

## 2024-05-30 NOTE — NURSING
Nurses Note -- 4 Eyes      5/29/24   7:45 pm      Skin assessed during: Daily Assessment      [] No Altered Skin Integrity Present    []Prevention Measures Documented      [x] Yes- Altered Skin Integrity Present or Discovered   [] LDA Added if Not in Epic (Describe Wound)   [x] New Altered Skin Integrity was Present on Admit and Documented in LDA   [] Wound Image Taken    Wound Care Consulted? No    Attending Nurse:  [email or web address]/sudhakar    Second RN/Staff Member:  lorie/shana

## 2024-05-30 NOTE — PT/OT/SLP PROGRESS
Occupational Therapy  Treatment    Name: Josep Angulo    : 1950 (73 y.o.)  MRN: [id number]           TREATMENT SUMMARY AND RECOMMENDATIONS:      OT Date of Treatment: 24  OT Start Time: 1050  OT Stop Time: 1109  OT Total Time (min): 19 min      Subjective Assessment:   No complaints  Lethargic   x Awake, alert, cooperative  Impulsive    Uncooperative   Flat affect    Agitated  c/o pain    Appropriate  c/o fatigue    Confused  Treated at bedside     Emotionally labile x Treated in gym/dept.      Other:        Therapy Precautions:    Cognitive deficits  Spinal precautions    Collar - hard  Sternal precautions    Collar - soft   TLSO   x Fall risk  LSO    Hip precautions - posterior  Knee immobilizer    Hip precautions - anterior  WBAT    Impaired communication  Partial weightbearing    Oxygen  TTWB    PEG tube  NWB    Visual deficits      Hearing deficits   Other:        Treatment Objectives:     Mobility Training:    Mobility task Assist level Comments    Bed mobility     Transfer SBA Modified squat/pivot t/f BSChair<>w/c   Sit to stands transitions SBA 2x5 reps SBA from w/c level    Functional mobility     Sitting balance     Standing balance      Other:          Therapeutic Exercise:   Exercise Sets Reps Comments   3# dowel 1 20 Shoulder press, chest press, straight arm raises, bicep curls, forward rows, backwards rows   Red flexbar 1 20 Wrist flex/ext, forearm pro/sup                   Additional Comments:  Pt still awaiting placement to NH; issue with financials.     Assessment: Patient tolerated session well. Pt had positive response to today's treatment. Pt continues to make good progress towards goals. Pt would continue to benefit from skilled OT services to improve pt's safety and independence with daily occupations, decrease caregiver burden, and reduce pt's risk of falls.     GOALS:   Multidisciplinary Problems       Occupational Therapy Goals          Problem: Occupational Therapy    Goal  Priority Disciplines Outcome Interventions   Occupational Therapy Goal     OT, PT/OT Progressing    Description: Goals to be met by: 6/4/24     Patient will increase functional independence with ADLs by performing:    LE Dressing with Contact Guard Assistance.  Grooming while standing at sink with Modified [place].  Toileting from toilet with Contact Guard Assistance for hygiene and clothing management.   Bathing from  shower chair/bench with Contact Guard Assistance.  Toilet transfer to toilet with Stand-by Assistance.                         Recommendations:     Discharge Equipment Recommendations:  none     Plan:     Patient to be seen 6 x/week to address the above listed problems via self-care/home management, therapeutic activities, therapeutic exercises, wheelchair management/training  Plan of Care Expires: 06/04/24  Plan of Care Reviewed with: patient  Revisions made to plan of care: No      Skilled OT Minutes Provided: 19  Communication with Treatment Team:     Equipment recommendations:       At end of treatment, patient remained:  x Up in chair     Up in wheelchair in room    In bed   x With alarm activated    Bed rails up   x Call bell in reach     Family/friends present    Restraints secured properly    In bathroom with CNA/RN notified    In gym with PT/PTA/tech    Nurse aware    Other:

## 2024-05-31 LAB
ANION GAP SERPL CALC-SCNC: 11 MEQ/L
ANION GAP SERPL CALC-SCNC: 7 MEQ/L
BASOPHILS # BLD AUTO: 0.05 X10(3)/MCL
BASOPHILS NFR BLD AUTO: 0.7 %
BUN SERPL-MCNC: 26.3 MG/DL (ref 8.4–25.7)
BUN SERPL-MCNC: 26.6 MG/DL (ref 8.4–25.7)
CALCIUM SERPL-MCNC: 9.3 MG/DL (ref 8.8–10)
CALCIUM SERPL-MCNC: 9.3 MG/DL (ref 8.8–10)
CHLORIDE SERPL-SCNC: 101 MMOL/L (ref 98–107)
CHLORIDE SERPL-SCNC: 103 MMOL/L (ref 98–107)
CO2 SERPL-SCNC: 23 MMOL/L (ref 23–31)
CO2 SERPL-SCNC: 25 MMOL/L (ref 23–31)
CREAT SERPL-MCNC: 1.17 MG/DL (ref 0.73–1.18)
CREAT SERPL-MCNC: 1.19 MG/DL (ref 0.73–1.18)
CREAT/UREA NIT SERPL: 22
CREAT/UREA NIT SERPL: 23
EOSINOPHIL # BLD AUTO: 0.35 X10(3)/MCL (ref 0–0.9)
EOSINOPHIL NFR BLD AUTO: 5.1 %
ERYTHROCYTE [DISTWIDTH] IN BLOOD BY AUTOMATED COUNT: 14 % (ref 11.5–17)
GFR SERPLBLD CREATININE-BSD FMLA CKD-EPI: >60 ML/MIN/1.73/M2
GFR SERPLBLD CREATININE-BSD FMLA CKD-EPI: >60 ML/MIN/1.73/M2
GLUCOSE SERPL-MCNC: 109 MG/DL (ref 82–115)
GLUCOSE SERPL-MCNC: 111 MG/DL (ref 82–115)
GLUCOSE SERPL-MCNC: 113 MG/DL (ref 70–110)
GLUCOSE SERPL-MCNC: 127 MG/DL (ref 70–110)
HCT VFR BLD AUTO: 35.1 % (ref 42–52)
HGB BLD-MCNC: 11.5 G/DL (ref 14–18)
IMM GRANULOCYTES # BLD AUTO: 0.01 X10(3)/MCL (ref 0–0.04)
IMM GRANULOCYTES NFR BLD AUTO: 0.1 %
LYMPHOCYTES # BLD AUTO: 2.45 X10(3)/MCL (ref 0.6–4.6)
LYMPHOCYTES NFR BLD AUTO: 35.7 %
MCH RBC QN AUTO: 27.8 PG (ref 27–31)
MCHC RBC AUTO-ENTMCNC: 32.8 G/DL (ref 33–36)
MCV RBC AUTO: 85 FL (ref 80–94)
MONOCYTES # BLD AUTO: 0.5 X10(3)/MCL (ref 0.1–1.3)
MONOCYTES NFR BLD AUTO: 7.3 %
NEUTROPHILS # BLD AUTO: 3.51 X10(3)/MCL (ref 2.1–9.2)
NEUTROPHILS NFR BLD AUTO: 51.1 %
PLATELET # BLD AUTO: 388 X10(3)/MCL (ref 130–400)
PMV BLD AUTO: 9.8 FL (ref 7.4–10.4)
POCT GLUCOSE: 113 MG/DL (ref 70–110)
POCT GLUCOSE: 127 MG/DL (ref 70–110)
POCT GLUCOSE: 134 MG/DL (ref 70–110)
POCT GLUCOSE: 160 MG/DL (ref 70–110)
POTASSIUM SERPL-SCNC: 4.6 MMOL/L (ref 3.5–5.1)
POTASSIUM SERPL-SCNC: 5.5 MMOL/L (ref 3.5–5.1)
RBC # BLD AUTO: 4.13 X10(6)/MCL (ref 4.7–6.1)
SODIUM SERPL-SCNC: 135 MMOL/L (ref 136–145)
SODIUM SERPL-SCNC: 135 MMOL/L (ref 136–145)
WBC # SPEC AUTO: 6.87 X10(3)/MCL (ref 4.5–11.5)

## 2024-05-31 PROCEDURE — 97110 THERAPEUTIC EXERCISES: CPT

## 2024-05-31 PROCEDURE — 97530 THERAPEUTIC ACTIVITIES: CPT

## 2024-05-31 PROCEDURE — 80048 BASIC METABOLIC PNL TOTAL CA: CPT | Performed by: INTERNAL MEDICINE

## 2024-05-31 PROCEDURE — 36415 COLL VENOUS BLD VENIPUNCTURE: CPT | Performed by: INTERNAL MEDICINE

## 2024-05-31 PROCEDURE — 80048 BASIC METABOLIC PNL TOTAL CA: CPT | Performed by: PHYSICIAN ASSISTANT

## 2024-05-31 PROCEDURE — 97116 GAIT TRAINING THERAPY: CPT

## 2024-05-31 PROCEDURE — 25000003 PHARM REV CODE 250: Performed by: PHYSICIAN ASSISTANT

## 2024-05-31 PROCEDURE — 25000003 PHARM REV CODE 250: Performed by: INTERNAL MEDICINE

## 2024-05-31 PROCEDURE — 11000004 HC SNF PRIVATE

## 2024-05-31 PROCEDURE — 63600175 PHARM REV CODE 636 W HCPCS: Performed by: PHYSICIAN ASSISTANT

## 2024-05-31 PROCEDURE — 25000242 PHARM REV CODE 250 ALT 637 W/ HCPCS: Performed by: PHYSICIAN ASSISTANT

## 2024-05-31 PROCEDURE — 94760 N-INVAS EAR/PLS OXIMETRY 1: CPT

## 2024-05-31 PROCEDURE — 99900035 HC TECH TIME PER 15 MIN (STAT)

## 2024-05-31 PROCEDURE — 85025 COMPLETE CBC W/AUTO DIFF WBC: CPT | Performed by: PHYSICIAN ASSISTANT

## 2024-05-31 PROCEDURE — 36415 COLL VENOUS BLD VENIPUNCTURE: CPT | Performed by: PHYSICIAN ASSISTANT

## 2024-05-31 RX ADMIN — ENOXAPARIN SODIUM 40 MG: 40 INJECTION SUBCUTANEOUS at 05:05

## 2024-05-31 RX ADMIN — LISINOPRIL 20 MG: 20 TABLET ORAL at 08:05

## 2024-05-31 RX ADMIN — CITALOPRAM HYDROBROMIDE 10 MG: 10 TABLET ORAL at 08:05

## 2024-05-31 RX ADMIN — EMPAGLIFLOZIN 25 MG: 25 TABLET, FILM COATED ORAL at 08:05

## 2024-05-31 RX ADMIN — FERROUS SULFATE TAB 325 MG (65 MG ELEMENTAL FE) 1 EACH: 325 (65 FE) TAB at 08:05

## 2024-05-31 RX ADMIN — METFORMIN HYDROCHLORIDE 1000 MG: 500 TABLET ORAL at 08:05

## 2024-05-31 RX ADMIN — TAMSULOSIN HYDROCHLORIDE 0.4 MG: 0.4 CAPSULE ORAL at 08:05

## 2024-05-31 RX ADMIN — SODIUM ZIRCONIUM CYCLOSILICATE 10 G: 10 POWDER, FOR SUSPENSION ORAL at 08:05

## 2024-05-31 RX ADMIN — ASPIRIN 81 MG: 81 TABLET, COATED ORAL at 08:05

## 2024-05-31 RX ADMIN — METFORMIN HYDROCHLORIDE 1000 MG: 500 TABLET ORAL at 09:05

## 2024-05-31 RX ADMIN — MIRTAZAPINE 15 MG: 7.5 TABLET, FILM COATED ORAL at 09:05

## 2024-05-31 RX ADMIN — INSULIN ASPART 2 UNITS: 100 INJECTION, SOLUTION INTRAVENOUS; SUBCUTANEOUS at 05:05

## 2024-05-31 NOTE — PLAN OF CARE
Weekly Staffing Report      Date Admitted: 5/21/2024 :   Staffing Date: 05/29/2024    Patient Active Problem List   Diagnosis    Hypoglycemia associated with diabetes          Team Members Present:       Nursing Present     Yes [x]    No []    Physical Therapy Present    Yes [x]    No []    Occupational Therapy Present     Yes [x]    No []    Speech Therapy Present    Yes []    No []    NA [x]    Dietary Present    Yes [x]    No []        Physician Present     [] Thairy Reyes    [] Vani Agosto    [] ALICE Morley    [x] Dr. Yates      Family Present    [x] Adult Children-Nellie on phone    [] Spouse    [] POA    [] Friend/ Caregiver    [] Other       Interdisciplinary Meeting Notes:  See PT/OT notes for progress. Notified daughter of acceptance to [facility]. Waiting on financials prior to being able to discharge. No acute issues. Bank is mailing bank statements to patient's home and daughter will bring to hospital. NRD 05/30/2024. All questions answered at this time                Please see Documented PT/OT/ST, Dietary, Nursing, and Physician notes for detailed treatment information.

## 2024-05-31 NOTE — PROGRESS NOTES
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 Daughter reports patient was unable to follow commands and falling asleep easily. Upon arrival to the ER, vitals revealed elevated temperature reading and mildly elevated BP. Labs were significant for leukocytosis, hypoglycemia with a glucose of 28, elevated CRP. Patient was given a D10 infusion with improvement of glucose to 126. CXR revealed prominent interstitial markings with no focal opacification, no acute cardiopulmonary process appreciated. CT head revealed no acute intracranial abnormality identified with chronic microvascular ischemic disease. Daughter at bedside brought patient's home medication bottles. Patient had 4 bottles of Metformin 1000mg BID which daughter reports patient only takes once daily and 3 bottles of Glimepiride 2mg BID. Daughter reports she gets help from a nurse who comes to her home to organize the patient's medication therefore she does not believe he could have taken too much medication. On exam, CBG registered as <20. He did receive a one time dose of Octreotide 50mcg and D5NS at 100cc/hr. Patient is being admitted to hospital medicine service.      His glucose improved after the dose of octreotide.  D5 NS was able to be discontinued.  He is now on metformin a 1000 mg b.i.d. and tolerating well.  Blood pressure was noted to be elevated and home lisinopril has been increased to 20 mg daily starting today.  Patient is being transitioned to  our swing unit for continued therapy while awaiting placement at nursing home.      Subjective:     Principal Problem:Hypoglycemia associated with diabetes    Interval History:   Doing well this am. No new complaints     Review of Systems   All other systems reviewed and are negative.    Objective:     Vital Signs (Most Recent):  Temp: 97.5 °F (36.4 °C) (05/31/24 0723)  Pulse: 87 (05/31/24 0906)  Resp: 18 (05/31/24 0906)  BP: (!) 146/83 (05/31/24 0723)  SpO2: 97 % (05/31/24 0906) Vital Signs (24h Range):  Temp:  [97.4 °F (36.3 °C)-97.5 °F (36.4 °C)] 97.5 °F (36.4 °C)  Pulse:  [83-87] 87  Resp:  [18] 18  SpO2:  [97 %-100 %] 97 %  BP: ()/(61-83) 146/83     Weight: 75.2 kg (165 lb 12.6 oz)  Body mass index is 25.21 kg/m².    Intake/Output Summary (Last 24 hours) at 5/31/2024 0913  Last data filed at 5/31/2024 0632  Gross per 24 hour   Intake 480 ml   Output 975 ml   Net -495 ml      Physical Exam  Vitals reviewed. Exam conducted with a chaperone present.   Constitutional:       Appearance: Normal appearance. He is normal weight.   HENT:      Head: Normocephalic and atraumatic.      Nose: Nose normal.      Mouth/Throat:      Mouth: Mucous membranes are moist.      Pharynx: Oropharynx is clear.   Eyes:      Extraocular Movements: Extraocular movements intact.      Conjunctiva/sclera: Conjunctivae normal.      Pupils: Pupils are equal, round, and reactive to light.   Cardiovascular:      Rate and Rhythm: Normal rate and regular rhythm.      Pulses: Normal pulses.      Heart sounds: Normal heart sounds.   Pulmonary:      Effort: Pulmonary effort is normal.      Breath sounds: Normal breath sounds.   Abdominal:      General: Abdomen is flat. Bowel sounds are normal.      Palpations: Abdomen is soft.   Musculoskeletal:         General: Normal range of motion.      Cervical back: Normal range of motion.   Skin:     General: Skin is warm.   Neurological:      General: No focal deficit present.      Mental Status: He is  alert. Mental status is at baseline.         Significant Labs: All pertinent labs within the past 24 hours have been reviewed.    Significant Imaging: I have reviewed all pertinent imaging results/findings within the past 24 hours.    Assessment/Plan:      Active Diagnoses:    Diagnosis Date Noted POA    PRINCIPAL PROBLEM:  Hypoglycemia associated with diabetes [E11.649] 05/13/2024 Yes      Problems Resolved During this Admission:     VTE Risk Mitigation (From admission, onward)           Ordered     enoxaparin injection 40 mg  Daily         05/21/24 1239     IP VTE HIGH RISK PATIENT  Once         05/21/24 1239     Place sequential compression device  Until discontinued         05/21/24 1239                  Sulfonylurea induced hypoglycemia   Uncontrolled DM2   Hold home Glimepiride   Received D10 in ER, Octreotide 50mcg  A1c 6%   Continue Metformin to 1000mg BID and Jardiance 25mg QD     Hyperkalemia  - K this am was 5.5, given one dose of lokelma  - Repeat BMP this evening      Acute metabolic encephalopathy - resolved   2/2 hypoglycemia and receiving sedating medications in the ER   Limit sedating medications   CT head revealed no acute intracranial abnormality identified with chronic microvascular ischemic disease     Leukocytosis - resolved   UA without UTI   CXR without acute findings     Covid, flu, RSV negative      HTN  Continue Lisinopril to 20mg on 05/21/2024     BPH  Continue home Flomax     Iron deficiency   Continue home iron supplementation     Bipolar disorder, insomnia, dementia  Continue home Mirtazapine   Daughter requesting NH placement at [facility] -  consulted      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   PT/OT - needs appointment with  due to poorly fitted prosthetic      Hyperkalemia - resolved  Required 1 dose of Kayexalate     DVT prophylaxis:  Lovenox      Services provided via two way audio/visual telecommunication  Provider location: [place], [place]  Patient location: [facility]  Zuhair Yates MD  Department of Hospital Medicine   [facility]

## 2024-05-31 NOTE — PT/OT/SLP PROGRESS
Physical Therapy Treatment Note           Name: Josep Angulo    : 1950 (73 y.o.)  MRN: [id number]           TREATMENT SUMMARY AND RECOMMENDATIONS:    PT Received On: 24  PT Start Time: 1349     PT Stop Time: 1412  PT Total Time (min): 23 min     Subjective Assessment:   No complaints  Lethargic   x Awake, alert, cooperative  Uncooperative    Agitated  c/o pain    Appropriate  c/o fatigue    Confused  Treated at bedside     Emotionally labile x Treated in gym/dept.    Impulsive  Other:    Flat affect       Therapy Precautions:    Cognitive deficits  Spinal precautions    Collar - hard  Sternal precautions    Collar - soft   TLSO   x Fall risk  LSO    Hip precautions - posterior  Knee immobilizer    Hip precautions - anterior  WBAT    Impaired communication  Partial weightbearing    Oxygen  TTWB    PEG tube  NWB    Visual deficits x Other: L BKA (poor fitting prosthesis in room)    Hearing deficits          Treatment Objectives:     Mobility Training:   Assist level Comments    Bed mobility     Transfer     Gait CGA Amb on firm surface with RW  3 x 25 ft 3 point gait patterns due to L BKA   Sit to stand transitions  SBA X 5 reps with B UE use   Sitting balance     Standing balance      Wheelchair mobility SBA 2 X 200 feet, improving I with turns, but extra time still needed.  Ran into wall x 0   Car transfer     Other:          Therapeutic Exercise:   Exercise Sets Reps Comments                               Additional Comments:  Pt without any issues.     Assessment: Patient tolerated session well. Awaiting NH placement     PT Plan: continue. Pt to be seen 1-2 times per day, 5-6 days per week.   Revisions made to plan of care: reviewed- progressing     GOALS:   Multidisciplinary Problems       Physical Therapy Goals          Problem: Physical Therapy    Goal Priority Disciplines Outcome Goal Variances Interventions   Physical Therapy Goal     PT, PT/OT Progressing     Description: Description:  Goals to be met by: Discharge       Patient will increase functional independence with mobility by performin. Sit to stand transfer with Supervision  2. Bed to chair transfer with Supervision using Rolling Walker  3. Wheelchair propulsion x 300 feet with Modified [place] using bilateral uppper extremities.   4. Gait x 50 feet at SPV levels using RW with or without prosthesis (currently poor fitting and needs  follow op)                         Skilled PT Minutes Provided: 25   Communication with Treatment Team:     Equipment recommendations:       At end of treatment, patient remained:  x Up in chair     Up in wheelchair in room    In bed   x With alarm activated    Bed rails up   x Call bell in reach     Family/friends present    Restraints secured properly    In bathroom with CNA/RN notified    Nurse aware    In gym with therapist/tech    Other:

## 2024-05-31 NOTE — PLAN OF CARE
Problem: Adult Inpatient Plan of Care  Goal: Plan of Care Review  Outcome: Progressing  Flowsheets (Taken 5/30/2024 2000)  Plan of Care Reviewed With: patient  Goal: Patient-Specific Goal (Individualized)  Outcome: Progressing  Flowsheets (Taken 5/30/2024 2000)  Individualized Care Needs: CBG, frequent rounding, saftey  Anxieties, Fears or Concerns: none expressed at this time  Patient/Family-Specific Goals (Include Timeframe): NH at d/c  Goal: Absence of Hospital-Acquired Illness or Injury  Outcome: Progressing  Intervention: Prevent Skin Injury  Flowsheets (Taken 5/30/2024 2000)  Body Position: position maintained  Skin Protection:   skin sealant/moisture barrier applied   transparent dressing maintained  Goal: Optimal Comfort and Wellbeing  Outcome: Progressing  Goal: Readiness for Transition of Care  Outcome: Progressing     Problem: Diabetes Comorbidity  Goal: Blood Glucose Level Within Targeted Range  Outcome: Progressing     Problem: Fall Injury Risk  Goal: Absence of Fall and Fall-Related Injury  Outcome: Progressing  Intervention: Identify and Manage Contributors  Flowsheets (Taken 5/30/2024 2000)  Self-Care Promotion:   independence encouraged   BADL personal objects within reach   BADL personal routines maintained   adaptive equipment use encouraged  Medication Review/Management: medications reviewed     Problem: Wound  Goal: Optimal Coping  Outcome: Progressing  Intervention: Support Patient and Family Response  Flowsheets (Taken 5/30/2024 2000)  Supportive Measures: active listening utilized  Goal: Optimal Functional Ability  Outcome: Progressing  Goal: Absence of Infection Signs and Symptoms  Outcome: Progressing  Goal: Improved Oral Intake  Outcome: Progressing  Goal: Optimal Pain Control and Function  Outcome: Progressing  Intervention: Prevent or Manage Pain  Flowsheets (Taken 5/30/2024 2000)  Sleep/Rest Enhancement: awakenings minimized  Goal: Skin Health and Integrity  Outcome:  Progressing  Intervention: Optimize Skin Protection  Flowsheets (Taken 5/30/2024 2000)  Skin Protection:   skin sealant/moisture barrier applied   transparent dressing maintained  Goal: Optimal Wound Healing  Outcome: Progressing  Intervention: Promote Wound Healing  Flowsheets (Taken 5/30/2024 2000)  Sleep/Rest Enhancement: awakenings minimized     Problem: Infection  Goal: Absence of Infection Signs and Symptoms  Outcome: Progressing     Problem: Skin Injury Risk Increased  Goal: Skin Health and Integrity  Outcome: Progressing  Intervention: Optimize Skin Protection  Flowsheets (Taken 5/30/2024 2000)  Skin Protection:   skin sealant/moisture barrier applied   transparent dressing maintained

## 2024-05-31 NOTE — PT/OT/SLP PROGRESS
"         Physical Therapy Treatment Note           Name: Josep Angulo    : 1950 (73 y.o.)  MRN: [id number]           TREATMENT SUMMARY AND RECOMMENDATIONS:    PT Received On: 24  PT Start Time: 918     PT Stop Time: 945  PT Total Time (min): 27 min     Subjective Assessment:   No complaints  Lethargic   x Awake, alert, cooperative  Uncooperative    Agitated  c/o pain    Appropriate  c/o fatigue    Confused  Treated at bedside     Emotionally labile x Treated in gym/dept.    Impulsive  Other:    Flat affect       Therapy Precautions:    Cognitive deficits  Spinal precautions    Collar - hard  Sternal precautions    Collar - soft   TLSO   x Fall risk  LSO    Hip precautions - posterior  Knee immobilizer    Hip precautions - anterior  WBAT    Impaired communication  Partial weightbearing    Oxygen  TTWB    PEG tube  NWB    Visual deficits x Other: L BKA (poor fitting prosthesis in room)    Hearing deficits          Treatment Objectives:     Mobility Training:   Assist level Comments    Bed mobility     Transfer SBA Stand pivot recliner >WC using RW   Gait CGA Amb on firm surface with RW  3 x 25 ft 3 point gait patterns due to L BKA   Sit to stand transitions  SBA X 12 reps with B UE use   Sitting balance     Standing balance  CGA Pulling up pants with 1 UE use and RW use   Wheelchair mobility SBA 2 X 175 feet, improving I with turns, but extra time still needed.  Ran into wall x 0   Car transfer     Other:          Therapeutic Exercise:   Exercise Sets Reps Comments   Seated B LE EX 2 15 3# on RLE; hip flexion, knee ext, hip abd/add and R ankle PF/DF                         Additional Comments:  Pt reported "I don't feel like talking today". Overall no issues with exercises completed. Mild fatigue. Pt brought outside in attempts to improve mood.     Assessment: Patient tolerated session well. Awaiting NH placement     PT Plan: continue. Pt to be seen 1-2 times per day, 5-6 days per week.   Revisions " made to plan of care: reviewed- progressing     GOALS:   Multidisciplinary Problems       Physical Therapy Goals          Problem: Physical Therapy    Goal Priority Disciplines Outcome Goal Variances Interventions   Physical Therapy Goal     PT, PT/OT Progressing     Description: Description: Goals to be met by: Discharge       Patient will increase functional independence with mobility by performin. Sit to stand transfer with Supervision  2. Bed to chair transfer with Supervision using Rolling Walker  3. Wheelchair propulsion x 300 feet with Modified [place] using bilateral uppper extremities.   4. Gait x 50 feet at SPV levels using RW with or without prosthesis (currently poor fitting and needs  follow op)                         Skilled PT Minutes Provided: 25   Communication with Treatment Team:     Equipment recommendations:       At end of treatment, patient remained:  x Up in chair     Up in wheelchair in room    In bed   x With alarm activated    Bed rails up   x Call bell in reach     Family/friends present    Restraints secured properly    In bathroom with CNA/RN notified    Nurse aware    In gym with therapist/tech    Other:

## 2024-05-31 NOTE — PT/OT/SLP PROGRESS
Occupational Therapy  Treatment    Name: Josep Angulo    : 1950 (73 y.o.)  MRN: [id number]           TREATMENT SUMMARY AND RECOMMENDATIONS:      OT Date of Treatment: 24  OT Start Time: 948  OT Stop Time: 100  OT Total Time (min): 16 min      Subjective Assessment:   No complaints  Lethargic   x Awake, alert, cooperative  Impulsive    Uncooperative   Flat affect    Agitated  c/o pain    Appropriate  c/o fatigue    Confused  Treated at bedside     Emotionally labile x Treated in gym/dept.      Other:        Therapy Precautions:    Cognitive deficits  Spinal precautions    Collar - hard  Sternal precautions    Collar - soft   TLSO   x Fall risk  LSO    Hip precautions - posterior  Knee immobilizer    Hip precautions - anterior  WBAT    Impaired communication  Partial weightbearing    Oxygen  TTWB    PEG tube  NWB    Visual deficits      Hearing deficits   Other:        Treatment Objectives:     Mobility Training:    Mobility task Assist level Comments    Bed mobility     Transfer SBA Modified squat/pivot t/f w/c>BSchair   Sit to stands transitions SBA From w/c level   Functional mobility     Sitting balance     Standing balance  CGA Pt stood with RW anterior; participated in balloon volleyball hitting back<>forth with RUE. No LOB noted.    Other:          Therapeutic Exercise:   Exercise Sets Reps Comments   3# dowel 1 20 Shoulder press, chest press, straight arm raises, bicep curls, forward rows, backwards rows   Red flexbar 1 30  Wrist flex/ext, forearm pro/sup                   Assessment: Patient tolerated session well. Pt had positive response to today's treatment. Pt continues to make good progress towards goals. Pt would continue to benefit from skilled OT services to improve pt's safety and independence with daily occupations, decrease caregiver burden, and reduce pt's risk of falls.     GOALS:   Multidisciplinary Problems       Occupational Therapy Goals          Problem: Occupational Therapy     Goal Priority Disciplines Outcome Interventions   Occupational Therapy Goal     OT, PT/OT Progressing    Description: Goals to be met by: 6/4/24     Patient will increase functional independence with ADLs by performing:    LE Dressing with Contact Guard Assistance.  Grooming while standing at sink with Modified [place].  Toileting from toilet with Contact Guard Assistance for hygiene and clothing management.   Bathing from  shower chair/bench with Contact Guard Assistance.  Toilet transfer to toilet with Stand-by Assistance.                         Recommendations:     Discharge Equipment Recommendations:  none     Plan:     Patient to be seen 6 x/week to address the above listed problems via self-care/home management, therapeutic activities, therapeutic exercises, wheelchair management/training  Plan of Care Expires: 06/04/24  Plan of Care Reviewed with: patient  Revisions made to plan of care: No      Skilled OT Minutes Provided: 16  Communication with Treatment Team:     Equipment recommendations:       At end of treatment, patient remained:  x Up in chair     Up in wheelchair in room    In bed   x With alarm activated    Bed rails up   x Call bell in reach     Family/friends present    Restraints secured properly    In bathroom with CNA/RN notified    In gym with PT/PTA/tech    Nurse aware    Other:

## 2024-06-01 LAB
GLUCOSE SERPL-MCNC: 120 MG/DL (ref 70–110)
GLUCOSE SERPL-MCNC: 128 MG/DL (ref 70–110)
POCT GLUCOSE: 108 MG/DL (ref 70–110)
POCT GLUCOSE: 120 MG/DL (ref 70–110)
POCT GLUCOSE: 128 MG/DL (ref 70–110)
POCT GLUCOSE: 134 MG/DL (ref 70–110)

## 2024-06-01 PROCEDURE — 99900035 HC TECH TIME PER 15 MIN (STAT)

## 2024-06-01 PROCEDURE — 63600175 PHARM REV CODE 636 W HCPCS: Performed by: PHYSICIAN ASSISTANT

## 2024-06-01 PROCEDURE — 94760 N-INVAS EAR/PLS OXIMETRY 1: CPT

## 2024-06-01 PROCEDURE — 97110 THERAPEUTIC EXERCISES: CPT

## 2024-06-01 PROCEDURE — 25000003 PHARM REV CODE 250: Performed by: PHYSICIAN ASSISTANT

## 2024-06-01 PROCEDURE — 25000242 PHARM REV CODE 250 ALT 637 W/ HCPCS: Performed by: PHYSICIAN ASSISTANT

## 2024-06-01 PROCEDURE — 11000004 HC SNF PRIVATE

## 2024-06-01 RX ADMIN — LISINOPRIL 20 MG: 20 TABLET ORAL at 09:06

## 2024-06-01 RX ADMIN — TAMSULOSIN HYDROCHLORIDE 0.4 MG: 0.4 CAPSULE ORAL at 09:06

## 2024-06-01 RX ADMIN — METFORMIN HYDROCHLORIDE 1000 MG: 500 TABLET ORAL at 08:06

## 2024-06-01 RX ADMIN — CITALOPRAM HYDROBROMIDE 10 MG: 10 TABLET ORAL at 09:06

## 2024-06-01 RX ADMIN — METFORMIN HYDROCHLORIDE 1000 MG: 500 TABLET ORAL at 09:06

## 2024-06-01 RX ADMIN — EMPAGLIFLOZIN 25 MG: 25 TABLET, FILM COATED ORAL at 09:06

## 2024-06-01 RX ADMIN — MIRTAZAPINE 15 MG: 7.5 TABLET, FILM COATED ORAL at 08:06

## 2024-06-01 RX ADMIN — ASPIRIN 81 MG: 81 TABLET, COATED ORAL at 09:06

## 2024-06-01 RX ADMIN — ENOXAPARIN SODIUM 40 MG: 40 INJECTION SUBCUTANEOUS at 05:06

## 2024-06-01 RX ADMIN — FERROUS SULFATE TAB 325 MG (65 MG ELEMENTAL FE) 1 EACH: 325 (65 FE) TAB at 09:06

## 2024-06-01 NOTE — PT/OT/SLP PROGRESS
Occupational Therapy  Treatment    Name: Josep Angulo    : 1950 (73 y.o.)  MRN: [id number]           TREATMENT SUMMARY AND RECOMMENDATIONS:      OT Date of Treatment: 24  OT Start Time: 1153  OT Stop Time: 1216  OT Total Time (min): 23 min      Subjective Assessment:  x No complaints  Lethargic   x Awake, alert, cooperative  Impulsive    Uncooperative   Flat affect    Agitated  c/o pain    Appropriate  c/o fatigue    Confused  Treated at bedside     Emotionally labile x Treated in gym/dept.      Other:        Therapy Precautions:    Cognitive deficits  Spinal precautions    Collar - hard  Sternal precautions    Collar - soft   TLSO   x Fall risk  LSO    Hip precautions - posterior  Knee immobilizer    Hip precautions - anterior  WBAT    Impaired communication  Partial weightbearing    Oxygen  TTWB    PEG tube  NWB    Visual deficits      Hearing deficits   Other:        Treatment Objectives:     Mobility Training:    Mobility task Assist level Comments    Bed mobility     Transfer SBA Modified squat/pivot t/f BSChair<>w/c   Sit to stands transitions     Functional mobility     Sitting balance     Standing balance      Other:          Therapeutic Exercise:   Exercise Sets Reps Comments   3# dowel 1 20 Shoulder press, chest press, straight arm raises, bicep curls, forward rows, backwards rows   BLE exercises 1 20 SLR, ankle pumps, hip flex/ext, hip abd/add                   Assessment: Patient tolerated session well.    GOALS:   Multidisciplinary Problems       Occupational Therapy Goals          Problem: Occupational Therapy    Goal Priority Disciplines Outcome Interventions   Occupational Therapy Goal     OT, PT/OT Progressing    Description: Goals to be met by: 24     Patient will increase functional independence with ADLs by performing:    LE Dressing with Contact Guard Assistance.  Grooming while standing at sink with Modified [place].  Toileting from toilet with Contact Guard Assistance  for hygiene and clothing management.   Bathing from  shower chair/bench with Contact Guard Assistance.  Toilet transfer to toilet with Stand-by Assistance.                         Recommendations:     Discharge Equipment Recommendations:  none     Plan:     Patient to be seen 6 x/week to address the above listed problems via self-care/home management, therapeutic activities, therapeutic exercises, wheelchair management/training  Plan of Care Expires: 06/04/24  Plan of Care Reviewed with: patient  Revisions made to plan of care: No      Skilled OT Minutes Provided: 23  Communication with Treatment Team:     Equipment recommendations:       At end of treatment, patient remained:  x Up in chair     Up in wheelchair in room    In bed   x With alarm activated    Bed rails up   x Call bell in reach     Family/friends present    Restraints secured properly    In bathroom with CNA/RN notified    In gym with PT/PTA/tech    Nurse aware    Other:

## 2024-06-01 NOTE — PLAN OF CARE
Problem: Adult Inpatient Plan of Care  Goal: Absence of Hospital-Acquired Illness or Injury  Outcome: Progressing  Intervention: Identify and Manage Fall Risk  Flowsheets (Taken 5/31/2024 2020)  Safety Promotion/Fall Prevention:   assistive device/personal item within reach   bed alarm set   lighting adjusted   medications reviewed   side rails raised x 3   instructed to call staff for mobility  Intervention: Prevent Skin Injury  Flowsheets (Taken 5/31/2024 2020)  Body Position: position changed independently  Skin Protection: incontinence pads utilized  Device Skin Pressure Protection: absorbent pad utilized/changed  Intervention: Prevent and Manage VTE (Venous Thromboembolism) Risk  Flowsheets (Taken 5/31/2024 2020)  VTE Prevention/Management: fluids promoted  Intervention: Prevent Infection  Flowsheets (Taken 5/31/2024 2020)  Infection Prevention: rest/sleep promoted     Problem: Diabetes Comorbidity  Goal: Blood Glucose Level Within Targeted Range  Outcome: Progressing  Intervention: Monitor and Manage Glycemia  Flowsheets (Taken 5/31/2024 2020)  Glycemic Management: blood glucose monitored     Problem: Fall Injury Risk  Goal: Absence of Fall and Fall-Related Injury  Outcome: Progressing  Intervention: Identify and Manage Contributors  Flowsheets (Taken 5/31/2024 2020)  Self-Care Promotion:   independence encouraged   BADL personal objects within reach   BADL personal routines maintained  Medication Review/Management: medications reviewed  Intervention: Promote Injury-Free Environment  Flowsheets (Taken 5/31/2024 2020)  Safety Promotion/Fall Prevention:   assistive device/personal item within reach   bed alarm set   lighting adjusted   medications reviewed   side rails raised x 3   instructed to call staff for mobility

## 2024-06-01 NOTE — PROGRESS NOTES
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 Daughter reports patient was unable to follow commands and falling asleep easily. Upon arrival to the ER, vitals revealed elevated temperature reading and mildly elevated BP. Labs were significant for leukocytosis, hypoglycemia with a glucose of 28, elevated CRP. Patient was given a D10 infusion with improvement of glucose to 126. CXR revealed prominent interstitial markings with no focal opacification, no acute cardiopulmonary process appreciated. CT head revealed no acute intracranial abnormality identified with chronic microvascular ischemic disease. Daughter at bedside brought patient's home medication bottles. Patient had 4 bottles of Metformin 1000mg BID which daughter reports patient only takes once daily and 3 bottles of Glimepiride 2mg BID. Daughter reports she gets help from a nurse who comes to her home to organize the patient's medication therefore she does not believe he could have taken too much medication. On exam, CBG registered as <20. He did receive a one time dose of Octreotide 50mcg and D5NS at 100cc/hr. Patient is being admitted to hospital medicine service.      His glucose improved after the dose of octreotide.  D5 NS was able to be discontinued.  He is now on metformin a 1000 mg b.i.d. and tolerating well.  Blood pressure was noted to be elevated and home lisinopril has been increased to 20 mg daily starting today.  Patient is being transitioned to  our swing unit for continued therapy while awaiting placement at nursing home.      Subjective:     Principal Problem:Hypoglycemia associated with diabetes    Interval History:   No acute events overnight.       Review of Systems   All other systems reviewed and are negative.    Objective:     Vital Signs (Most Recent):  Temp: 97.8 °F (36.6 °C) (06/01/24 0739)  Pulse: 89 (06/01/24 0739)  Resp: 18 (06/01/24 0721)  BP: 112/70 (06/01/24 0739)  SpO2: 98 % (06/01/24 0739) Vital Signs (24h Range):  Temp:  [97.7 °F (36.5 °C)-97.8 °F (36.6 °C)] 97.8 °F (36.6 °C)  Pulse:  [75-89] 89  Resp:  [12-18] 18  SpO2:  [96 %-100 %] 98 %  BP: (111-112)/(68-70) 112/70     Weight: 75.2 kg (165 lb 12.6 oz)  Body mass index is 25.21 kg/m².    Intake/Output Summary (Last 24 hours) at 6/1/2024 0912  Last data filed at 6/1/2024 0706  Gross per 24 hour   Intake 720 ml   Output 1125 ml   Net -405 ml      Physical Exam  Vitals reviewed. Exam conducted with a chaperone present.   Constitutional:       Appearance: Normal appearance. He is normal weight.   HENT:      Head: Normocephalic and atraumatic.      Nose: Nose normal.      Mouth/Throat:      Mouth: Mucous membranes are moist.      Pharynx: Oropharynx is clear.   Eyes:      Extraocular Movements: Extraocular movements intact.      Conjunctiva/sclera: Conjunctivae normal.      Pupils: Pupils are equal, round, and reactive to light.   Cardiovascular:      Rate and Rhythm: Normal rate and regular rhythm.      Pulses: Normal pulses.      Heart sounds: Normal heart sounds.   Pulmonary:      Effort: Pulmonary effort is normal.      Breath sounds: Normal breath sounds.   Abdominal:      General: Abdomen is flat. Bowel sounds are normal.      Palpations: Abdomen is soft.   Musculoskeletal:         General: Normal range of motion.      Cervical back: Normal range of motion.   Skin:     General: Skin is warm.   Neurological:      General: No focal deficit present.      Mental Status: He is alert.  Mental status is at baseline.         Significant Labs: All pertinent labs within the past 24 hours have been reviewed.    Significant Imaging: I have reviewed all pertinent imaging results/findings within the past 24 hours.    Assessment/Plan:      Active Diagnoses:    Diagnosis Date Noted POA    PRINCIPAL PROBLEM:  Hypoglycemia associated with diabetes [E11.649] 05/13/2024 Yes      Problems Resolved During this Admission:     VTE Risk Mitigation (From admission, onward)           Ordered     enoxaparin injection 40 mg  Daily         05/21/24 1239     IP VTE HIGH RISK PATIENT  Once         05/21/24 1239     Place sequential compression device  Until discontinued         05/21/24 1239                  Sulfonylurea induced hypoglycemia   Uncontrolled DM2   Hold home Glimepiride   Received D10 in ER, Octreotide 50mcg  A1c 6%   Continue Metformin to 1000mg BID and Jardiance 25mg QD     Hyperkalemia  - K this am was 5.5, given one dose of lokelma  - Repeat BMP this evening      Acute metabolic encephalopathy - resolved   2/2 hypoglycemia and receiving sedating medications in the ER   Limit sedating medications   CT head revealed no acute intracranial abnormality identified with chronic microvascular ischemic disease     Leukocytosis - resolved   UA without UTI   CXR without acute findings     Covid, flu, RSV negative      HTN  Continue Lisinopril to 20mg on 05/21/2024     BPH  Continue home Flomax     Iron deficiency   Continue home iron supplementation     Bipolar disorder, insomnia, dementia  Continue home Mirtazapine   Daughter requesting NH placement at [facility] -  consulted      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   PT/OT - needs appointment with [facility] due to poorly fitted prosthetic      Hyperkalemia - resolved  Required 1 dose of Kayexalate     DVT prophylaxis:  Marcie Agosto MD  Department of Hospital Medicine   [facility] - [facility]

## 2024-06-02 LAB
GLUCOSE SERPL-MCNC: 107 MG/DL (ref 70–110)
GLUCOSE SERPL-MCNC: 169 MG/DL (ref 70–110)
POCT GLUCOSE: 107 MG/DL (ref 70–110)
POCT GLUCOSE: 108 MG/DL (ref 70–110)
POCT GLUCOSE: 169 MG/DL (ref 70–110)
POCT GLUCOSE: 99 MG/DL (ref 70–110)

## 2024-06-02 PROCEDURE — 99900035 HC TECH TIME PER 15 MIN (STAT)

## 2024-06-02 PROCEDURE — 25000242 PHARM REV CODE 250 ALT 637 W/ HCPCS: Performed by: PHYSICIAN ASSISTANT

## 2024-06-02 PROCEDURE — 11000004 HC SNF PRIVATE

## 2024-06-02 PROCEDURE — 94760 N-INVAS EAR/PLS OXIMETRY 1: CPT

## 2024-06-02 PROCEDURE — 25000003 PHARM REV CODE 250: Performed by: PHYSICIAN ASSISTANT

## 2024-06-02 PROCEDURE — 63600175 PHARM REV CODE 636 W HCPCS: Performed by: PHYSICIAN ASSISTANT

## 2024-06-02 RX ADMIN — EMPAGLIFLOZIN 25 MG: 25 TABLET, FILM COATED ORAL at 08:06

## 2024-06-02 RX ADMIN — LISINOPRIL 20 MG: 20 TABLET ORAL at 08:06

## 2024-06-02 RX ADMIN — FERROUS SULFATE TAB 325 MG (65 MG ELEMENTAL FE) 1 EACH: 325 (65 FE) TAB at 08:06

## 2024-06-02 RX ADMIN — ASPIRIN 81 MG: 81 TABLET, COATED ORAL at 08:06

## 2024-06-02 RX ADMIN — MIRTAZAPINE 15 MG: 7.5 TABLET, FILM COATED ORAL at 08:06

## 2024-06-02 RX ADMIN — METFORMIN HYDROCHLORIDE 1000 MG: 500 TABLET ORAL at 08:06

## 2024-06-02 RX ADMIN — TAMSULOSIN HYDROCHLORIDE 0.4 MG: 0.4 CAPSULE ORAL at 08:06

## 2024-06-02 RX ADMIN — CITALOPRAM HYDROBROMIDE 10 MG: 10 TABLET ORAL at 08:06

## 2024-06-02 RX ADMIN — ENOXAPARIN SODIUM 40 MG: 40 INJECTION SUBCUTANEOUS at 04:06

## 2024-06-02 NOTE — PLAN OF CARE
Problem: Adult Inpatient Plan of Care  Goal: Plan of Care Review  Outcome: Progressing  Flowsheets (Taken 6/2/2024 0800)  Plan of Care Reviewed With: patient  Goal: Patient-Specific Goal (Individualized)  Outcome: Progressing  Flowsheets (Taken 6/2/2024 0800)  Individualized Care Needs: Monitor safety, CBG monitoring, and mobility  Anxieties, Fears or Concerns: None at this time  Patient/Family-Specific Goals (Include Timeframe): Nursing home discharge pending  Goal: Absence of Hospital-Acquired Illness or Injury  Outcome: Progressing  Intervention: Prevent Skin Injury  Flowsheets (Taken 6/2/2024 0800)  Device Skin Pressure Protection: absorbent pad utilized/changed  Goal: Optimal Comfort and Wellbeing  Outcome: Progressing  Intervention: Monitor Pain and Promote Comfort  Flowsheets (Taken 6/2/2024 0800)  Pain Management Interventions:   care clustered   quiet environment facilitated  Goal: Readiness for Transition of Care  Outcome: Progressing     Problem: Diabetes Comorbidity  Goal: Blood Glucose Level Within Targeted Range  Outcome: Progressing     Problem: Fall Injury Risk  Goal: Absence of Fall and Fall-Related Injury  Outcome: Progressing  Intervention: Identify and Manage Contributors  Flowsheets (Taken 6/2/2024 0800)  Self-Care Promotion: independence encouraged     Problem: Wound  Goal: Optimal Coping  Outcome: Progressing  Intervention: Support Patient and Family Response  Flowsheets (Taken 6/2/2024 0800)  Supportive Measures:   active listening utilized   self-care encouraged  Goal: Optimal Functional Ability  Outcome: Progressing  Intervention: Optimize Functional Ability  Flowsheets (Taken 6/2/2024 0800)  Assistive Device Utilized:   gait belt   walker  Goal: Optimal Pain Control and Function  Outcome: Progressing  Intervention: Prevent or Manage Pain  Flowsheets (Taken 6/2/2024 0800)  Sleep/Rest Enhancement: awakenings minimized  Pain Management Interventions:   care clustered   quiet environment  facilitated  Goal: Skin Health and Integrity  Outcome: Progressing  Intervention: Optimize Skin Protection  Flowsheets (Taken 6/2/2024 0800)  Pressure Reduction Techniques: heels elevated off bed  Goal: Optimal Wound Healing  Outcome: Progressing  Intervention: Promote Wound Healing  Flowsheets (Taken 6/2/2024 0800)  Sleep/Rest Enhancement: awakenings minimized     Problem: Skin Injury Risk Increased  Goal: Skin Health and Integrity  Outcome: Progressing  Intervention: Optimize Skin Protection  Flowsheets (Taken 6/2/2024 0800)  Pressure Reduction Techniques: heels elevated off bed

## 2024-06-02 NOTE — PROGRESS NOTES
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 Daughter reports patient was unable to follow commands and falling asleep easily. Upon arrival to the ER, vitals revealed elevated temperature reading and mildly elevated BP. Labs were significant for leukocytosis, hypoglycemia with a glucose of 28, elevated CRP. Patient was given a D10 infusion with improvement of glucose to 126. CXR revealed prominent interstitial markings with no focal opacification, no acute cardiopulmonary process appreciated. CT head revealed no acute intracranial abnormality identified with chronic microvascular ischemic disease. Daughter at bedside brought patient's home medication bottles. Patient had 4 bottles of Metformin 1000mg BID which daughter reports patient only takes once daily and 3 bottles of Glimepiride 2mg BID. Daughter reports she gets help from a nurse who comes to her home to organize the patient's medication therefore she does not believe he could have taken too much medication. On exam, CBG registered as <20. He did receive a one time dose of Octreotide 50mcg and D5NS at 100cc/hr. Patient is being admitted to hospital medicine service.      His glucose improved after the dose of octreotide.  D5 NS was able to be discontinued.  He is now on metformin a 1000 mg b.i.d. and tolerating well.  Blood pressure was noted to be elevated and home lisinopril has been increased to 20 mg daily starting today.  Patient is being transitioned to  our swing unit for continued therapy while awaiting placement at nursing home.      Subjective:     Principal Problem:Hypoglycemia associated with diabetes    Interval History:   No acute events overnight.       Review of Systems   All other systems reviewed and are negative.    Objective:     Vital Signs (Most Recent):  Temp: 98.2 °F (36.8 °C) (06/02/24 0734)  Pulse: 80 (06/02/24 0734)  Resp: 18 (06/02/24 0709)  BP: 133/76 (06/02/24 0844)  SpO2: 99 % (06/02/24 0734) Vital Signs (24h Range):  Temp:  [97.7 °F (36.5 °C)-98.2 °F (36.8 °C)] 98.2 °F (36.8 °C)  Pulse:  [79-80] 80  Resp:  [18] 18  SpO2:  [96 %-99 %] 99 %  BP: (124-133)/(73-76) 133/76     Weight: 75.2 kg (165 lb 12.6 oz)  Body mass index is 25.21 kg/m².    Intake/Output Summary (Last 24 hours) at 6/2/2024 0847  Last data filed at 6/2/2024 0100  Gross per 24 hour   Intake 600 ml   Output 925 ml   Net -325 ml      Physical Exam  Vitals reviewed. Exam conducted with a chaperone present.   Constitutional:       Appearance: Normal appearance. He is normal weight.   HENT:      Head: Normocephalic and atraumatic.      Nose: Nose normal.      Mouth/Throat:      Mouth: Mucous membranes are moist.      Pharynx: Oropharynx is clear.   Eyes:      Extraocular Movements: Extraocular movements intact.      Conjunctiva/sclera: Conjunctivae normal.      Pupils: Pupils are equal, round, and reactive to light.   Cardiovascular:      Rate and Rhythm: Normal rate and regular rhythm.      Pulses: Normal pulses.      Heart sounds: Normal heart sounds.   Pulmonary:      Effort: Pulmonary effort is normal.      Breath sounds: Normal breath sounds.   Abdominal:      General: Abdomen is flat. Bowel sounds are normal.      Palpations: Abdomen is soft.   Musculoskeletal:         General: Normal range of motion.      Cervical back: Normal range of motion.   Skin:     General: Skin is warm.   Neurological:      General: No focal deficit present.      Mental Status: He is alert. Mental  status is at baseline.         Significant Labs: All pertinent labs within the past 24 hours have been reviewed.    Significant Imaging: I have reviewed all pertinent imaging results/findings within the past 24 hours.    Assessment/Plan:      Active Diagnoses:    Diagnosis Date Noted POA    PRINCIPAL PROBLEM:  Hypoglycemia associated with diabetes [E11.649] 05/13/2024 Yes      Problems Resolved During this Admission:     VTE Risk Mitigation (From admission, onward)           Ordered     enoxaparin injection 40 mg  Daily         05/21/24 1239     IP VTE HIGH RISK PATIENT  Once         05/21/24 1239     Place sequential compression device  Until discontinued         05/21/24 1239                  Sulfonylurea induced hypoglycemia   Uncontrolled DM2   Hold home Glimepiride   Received D10 in ER, Octreotide 50mcg  A1c 6%   Continue Metformin to 1000mg BID and Jardiance 25mg QD     Hyperkalemia  - K this am was 5.5, given one dose of lokelma  - Repeat BMP this evening      Acute metabolic encephalopathy - resolved   2/2 hypoglycemia and receiving sedating medications in the ER   Limit sedating medications   CT head revealed no acute intracranial abnormality identified with chronic microvascular ischemic disease     Leukocytosis - resolved   UA without UTI   CXR without acute findings     Covid, flu, RSV negative      HTN  Continue Lisinopril to 20mg on 05/21/2024     BPH  Continue home Flomax     Iron deficiency   Continue home iron supplementation     Bipolar disorder, insomnia, dementia  Continue home Mirtazapine   Daughter requesting NH placement at [facility] -  consulted      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   PT/OT - needs appointment with [facility] due to poorly fitted prosthetic      Hyperkalemia - resolved  Required 1 dose of Kayexalate     DVT prophylaxis:  Marcie Agosto MD  Department of Hospital Medicine   [facility]

## 2024-06-03 LAB
GLUCOSE SERPL-MCNC: 116 MG/DL (ref 70–110)
GLUCOSE SERPL-MCNC: 166 MG/DL (ref 70–110)
POCT GLUCOSE: 116 MG/DL (ref 70–110)
POCT GLUCOSE: 166 MG/DL (ref 70–110)
POCT GLUCOSE: 179 MG/DL (ref 70–110)
POCT GLUCOSE: 98 MG/DL (ref 70–110)

## 2024-06-03 PROCEDURE — 25000242 PHARM REV CODE 250 ALT 637 W/ HCPCS: Performed by: PHYSICIAN ASSISTANT

## 2024-06-03 PROCEDURE — 11000004 HC SNF PRIVATE

## 2024-06-03 PROCEDURE — 97116 GAIT TRAINING THERAPY: CPT | Mod: CQ

## 2024-06-03 PROCEDURE — 99900035 HC TECH TIME PER 15 MIN (STAT)

## 2024-06-03 PROCEDURE — 97110 THERAPEUTIC EXERCISES: CPT | Mod: CQ

## 2024-06-03 PROCEDURE — 97110 THERAPEUTIC EXERCISES: CPT

## 2024-06-03 PROCEDURE — 25000003 PHARM REV CODE 250: Performed by: PHYSICIAN ASSISTANT

## 2024-06-03 PROCEDURE — 94760 N-INVAS EAR/PLS OXIMETRY 1: CPT

## 2024-06-03 PROCEDURE — 63600175 PHARM REV CODE 636 W HCPCS: Performed by: PHYSICIAN ASSISTANT

## 2024-06-03 PROCEDURE — 97530 THERAPEUTIC ACTIVITIES: CPT

## 2024-06-03 RX ADMIN — ASPIRIN 81 MG: 81 TABLET, COATED ORAL at 08:06

## 2024-06-03 RX ADMIN — TAMSULOSIN HYDROCHLORIDE 0.4 MG: 0.4 CAPSULE ORAL at 08:06

## 2024-06-03 RX ADMIN — ENOXAPARIN SODIUM 40 MG: 40 INJECTION SUBCUTANEOUS at 04:06

## 2024-06-03 RX ADMIN — LISINOPRIL 20 MG: 20 TABLET ORAL at 08:06

## 2024-06-03 RX ADMIN — MIRTAZAPINE 15 MG: 7.5 TABLET, FILM COATED ORAL at 08:06

## 2024-06-03 RX ADMIN — INSULIN ASPART 2 UNITS: 100 INJECTION, SOLUTION INTRAVENOUS; SUBCUTANEOUS at 12:06

## 2024-06-03 RX ADMIN — EMPAGLIFLOZIN 25 MG: 25 TABLET, FILM COATED ORAL at 09:06

## 2024-06-03 RX ADMIN — CITALOPRAM HYDROBROMIDE 10 MG: 10 TABLET ORAL at 08:06

## 2024-06-03 RX ADMIN — FERROUS SULFATE TAB 325 MG (65 MG ELEMENTAL FE) 1 EACH: 325 (65 FE) TAB at 08:06

## 2024-06-03 RX ADMIN — INSULIN ASPART 1 UNITS: 100 INJECTION, SOLUTION INTRAVENOUS; SUBCUTANEOUS at 08:06

## 2024-06-03 RX ADMIN — METFORMIN HYDROCHLORIDE 1000 MG: 500 TABLET ORAL at 08:06

## 2024-06-03 NOTE — PT/OT/SLP PROGRESS
Occupational Therapy  Treatment    Name: Josep Angulo    : 1950 (73 y.o.)  MRN: [id number]           TREATMENT SUMMARY AND RECOMMENDATIONS:      OT Date of Treatment: 24  OT Start Time: 905  OT Stop Time: 930  OT Total Time (min): 25 min      Subjective Assessment:   No complaints  Lethargic   x Awake, alert, cooperative  Impulsive    Uncooperative   Flat affect    Agitated  c/o pain    Appropriate  c/o fatigue    Confused x Treated at bedside     Emotionally labile x Treated in gym/dept.      Other:        Therapy Precautions:    Cognitive deficits  Spinal precautions    Collar - hard  Sternal precautions    Collar - soft   TLSO   x Fall risk  LSO    Hip precautions - posterior  Knee immobilizer    Hip precautions - anterior  WBAT    Impaired communication  Partial weightbearing    Oxygen  TTWB    PEG tube  NWB    Visual deficits      Hearing deficits   Other:        Treatment Objectives:     Mobility Training:    Mobility task Assist level Comments    Bed mobility SBA Supine>EOB   Transfer CGA Stand/pivot t/f with RW EOB>w/c   Sit to stands transitions     Functional mobility     Sitting balance     Standing balance  good Pt stood with RW anterior for several minutes during toileting task   Other:        ADL Training:    ADL Assist level Comments    Feeding     Grooming/hygiene     Bathing     Upper body dressing     Lower body dressing CGA To priya briefs; CGA for balance/safety    Toileting CGA Pt able to wipe self and manage clothing in standing   Toilet transfer     Adaptive equipment training     Other:           Therapeutic Exercise:   Exercise Sets Reps Comments   3# dowel 1 30 Shoulder press, chest press, straight arm raises, bicep curls, forward rows, backwards rows   Red flexbar 1 20 Wrist flex/ext, forearm pro/sup   Sit to stands 2 5 SBA from w/c level              Additional Comments:  Still waiting NH placement     Assessment: Patient tolerated session well. Pt had positive response to  today's treatment. Pt continues to make good progress towards goals. Pt would continue to benefit from skilled OT services to improve pt's safety and independence with daily occupations, decrease caregiver burden, and reduce pt's risk of falls.     GOALS:   Multidisciplinary Problems       Occupational Therapy Goals          Problem: Occupational Therapy    Goal Priority Disciplines Outcome Interventions   Occupational Therapy Goal     OT, PT/OT Progressing    Description: Goals to be met by: 6/4/24     Patient will increase functional independence with ADLs by performing:    LE Dressing with Contact Guard Assistance.  Grooming while standing at sink with Modified Roberts.  Toileting from toilet with Contact Guard Assistance for hygiene and clothing management.   Bathing from  shower chair/bench with Contact Guard Assistance.  Toilet transfer to toilet with Stand-by Assistance.                         Recommendations:     Discharge Equipment Recommendations:  none     Plan:     Patient to be seen 6 x/week to address the above listed problems via self-care/home management, therapeutic activities, therapeutic exercises, wheelchair management/training  Plan of Care Expires: 06/04/24  Plan of Care Reviewed with: patient  Revisions made to plan of care: No      Skilled OT Minutes Provided: 25  Communication with Treatment Team:     Equipment recommendations:       At end of treatment, patient remained:   Up in chair     Up in wheelchair in room    In bed    With alarm activated    Bed rails up    Call bell in reach     Family/friends present    Restraints secured properly    In bathroom with CNA/RN notified   x In gym with PT/PTA/tech    Nurse aware    Other:

## 2024-06-03 NOTE — PROGRESS NOTES
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 Daughter reports patient was unable to follow commands and falling asleep easily. Upon arrival to the ER, vitals revealed elevated temperature reading and mildly elevated BP. Labs were significant for leukocytosis, hypoglycemia with a glucose of 28, elevated CRP. Patient was given a D10 infusion with improvement of glucose to 126. CXR revealed prominent interstitial markings with no focal opacification, no acute cardiopulmonary process appreciated. CT head revealed no acute intracranial abnormality identified with chronic microvascular ischemic disease. Daughter at bedside brought patient's home medication bottles. Patient had 4 bottles of Metformin 1000mg BID which daughter reports patient only takes once daily and 3 bottles of Glimepiride 2mg BID. Daughter reports she gets help from a nurse who comes to her home to organize the patient's medication therefore she does not believe he could have taken too much medication. On exam, CBG registered as <20. He did receive a one time dose of Octreotide 50mcg and D5NS at 100cc/hr. Patient is being admitted to hospital medicine service.      His glucose improved after the dose of octreotide.  D5 NS was able to be discontinued.  He is now on metformin a 1000 mg b.i.d. and tolerating well.  Blood pressure was noted to be elevated and home lisinopril has been increased to 20 mg daily starting today.  Patient is being transitioned to our swing unit for continued therapy while awaiting placement at nursing home.    Today:  No reported complaints today.  Awaiting family to obtain financials for [facility].     OBJECTIVE/PHYSICAL EXAM     VITAL SIGNS (MOST RECENT):  Temp: 98.5 °F (36.9 °C) (06/03/24 0736)  Pulse: 83 (06/03/24 0736)  Resp: 18 (06/02/24 1913)  BP: (!) 140/82 (06/03/24 0829)  SpO2: 99 % (06/03/24 0736) VITAL SIGNS (24 HOUR RANGE):  Temp:  [97.8 °F (36.6 °C)-98.5 °F (36.9 °C)] 98.5 °F (36.9 °C)  Pulse:  [83-89] 83  Resp:  [18] 18  SpO2:  [97 %-99 %] 99 %  BP: (128-140)/(70-82) 140/82     GENERAL: In no acute distress, afebrile  HEENT: Atraumatic, normocephalic, moist mucous membranes   CHEST: Clear to auscultation bilaterally  HEART: S1, S2, no appreciable murmur  ABDOMEN: Soft, nontender, BS +  MSK: Warm, no lower extremity edema, left BKA   NEUROLOGIC: Alert and oriented, moving all extremities with good strength   INTEGUMENTARY: See media for right great toe   PSYCHIATRY: Appropriate mood and affect     ASSESSMENT/PLAN     Sulfonylurea induced hypoglycemia   Uncontrolled DM2   Hold home Glimepiride   Received D10 in ER, Octreotide 50mcg  A1c 6%   Continue Metformin to 1000mg BID and Jardiance 25mg QD      Acute metabolic encephalopathy - resolved   2/2 hypoglycemia and receiving sedating medications in the ER   Limit sedating medications   CT head revealed no acute intracranial abnormality identified with chronic microvascular ischemic disease     Leukocytosis - resolved   UA without UTI   CXR without acute findings     Covid, flu, RSV negative      HTN  Continue Lisinopril to 20mg on 05/21/2024     BPH  Continue home Flomax     Iron deficiency   Continue home iron supplementation     Bipolar disorder, insomnia, dementia  Continue home Mirtazapine   Daughter requesting NH placement at [facility] [place] - [facility] consulted      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   PT/OT - needs appointment with [facility] due to poorly fitted prosthetic     Hyperkalemia - resolved  Required 1 dose of Kayexalate     DVT prophylaxis:  Lovenox     Anticipated discharge and disposition:  "Awaiting placement  __________________________________________________________________________    LABS/MICRO/MEDS/DIAGNOSTICS     LABS  Recent Labs     05/31/24  1256   *   K 4.6   CO2 23   BUN 26.3*   CREATININE 1.19*   GLUCOSE 111   CALCIUM 9.3     No results for input(s): "WBC", "RBC", "HCT", "MCV", "PLT" in the last 72 hours.    Invalid input(s): "HG"    MICROBIOLOGY  Microbiology Results (last 7 days)       ** No results found for the last 168 hours. **             MEDICATIONS   aspirin  81 mg Oral Daily    citalopram  10 mg Oral Daily    empagliflozin  25 mg Oral Daily    enoxparin  40 mg Subcutaneous Daily    ferrous sulfate  1 tablet Oral Daily    lisinopriL  20 mg Oral Daily    metFORMIN  1,000 mg Oral BID    mirtazapine  15 mg Oral QHS    tamsulosin  0.4 mg Oral Daily         INFUSIONS       DIAGNOSTIC TESTS  No orders to display      No results found for: "EF"     NUTRITION STATUS  Patient meets ASPEN criteria for   malnutrition of   per RD assessment as evidenced by:                       A minimum of two characteristics is recommended for diagnosis of either severe or non-severe malnutrition.     Case related differential diagnoses have been reviewed; assessment and plan has been documented. I have personally reviewed the labs and test results that are currently available; I have reviewed the patients medication list. I have reviewed the consulting providers recommendations. I have reviewed or attempted to review medical records based upon their availability.  All of the patient's and/or family's questions have been addressed and answered to the best of my ability.  I will continue to monitor closely and make adjustments to medical management as needed.  This document was created using M*Modal Fluency Direct.  Transcription errors may have been made.  Please contact me if any questions may rise regarding documentation to clarify transcription.      ALICE Hilton   Internal " Medicine  Department of Hospital Medicine [facility]

## 2024-06-03 NOTE — PLAN OF CARE
Problem: Adult Inpatient Plan of Care  Goal: Plan of Care Review  Outcome: Progressing  Flowsheets (Taken 6/2/2024 2015)  Plan of Care Reviewed With: patient  Goal: Patient-Specific Goal (Individualized)  Outcome: Progressing  Flowsheets (Taken 6/2/2024 2015)  Individualized Care Needs:   Safety with mobility to prevent injury   Monitor blood glucose levels   Monitor for s/s of infection.  Anxieties, Fears or Concerns: None offered at this time.  Patient/Family-Specific Goals (Include Timeframe): Improved condition by CIELO for discharge to healthcare facility.  Goal: Absence of Hospital-Acquired Illness or Injury  Outcome: Progressing  Intervention: Prevent Skin Injury  Flowsheets (Taken 6/2/2024 2015)  Skin Protection:   incontinence pads utilized   skin sealant/moisture barrier applied  Device Skin Pressure Protection:   absorbent pad utilized/changed   tubing/devices free from skin contact  Goal: Optimal Comfort and Wellbeing  Outcome: Progressing  Intervention: Monitor Pain and Promote Comfort  Flowsheets (Taken 6/2/2024 2015)  Pain Management Interventions:   care clustered   pain management plan reviewed with patient/caregiver   medication offered   quiet environment facilitated  Intervention: Provide Person-Centered Care  Flowsheets (Taken 6/2/2024 2015)  Trust Relationship/Rapport:   care explained   choices provided   emotional support provided   empathic listening provided   questions answered   questions encouraged   reassurance provided   thoughts/feelings acknowledged     Problem: Diabetes Comorbidity  Goal: Blood Glucose Level Within Targeted Range  Outcome: Progressing  Intervention: Monitor and Manage Glycemia  Flowsheets (Taken 6/2/2024 2015)  Glycemic Management:   blood glucose monitored   oral hydration promoted   supplemental insulin given     Problem: Fall Injury Risk  Goal: Absence of Fall and Fall-Related Injury  Outcome: Progressing  Intervention: Identify and Manage  Contributors  Flowsheets (Taken 6/2/2024 2015)  Self-Care Promotion:   independence encouraged   BADL personal objects within reach   BADL personal routines maintained  Medication Review/Management:   medications reviewed   high-risk medications identified     Problem: Skin Injury Risk Increased  Goal: Skin Health and Integrity  Outcome: Progressing  Intervention: Optimize Skin Protection  Flowsheets (Taken 6/2/2024 2015)  Pressure Reduction Techniques: frequent weight shift encouraged  Skin Protection:   incontinence pads utilized   skin sealant/moisture barrier applied  Activity Management: Up in chair - L3

## 2024-06-03 NOTE — PROGRESS NOTES
Inpatient Nutrition Assessment    Admit Date: 5/21/2024   Total duration of encounter: 13 days   Patient Age: 73 y.o.    Nutrition Recommendation/Prescription     Continue diabetic 2,000 kcal diet 2. Monitor intake, wt, labs, medications    Communication of Recommendations: reviewed with patient    Nutrition Assessment     Malnutrition Assessment/Nutrition-Focused Physical Exam                                                                A minimum of two characteristics is recommended for diagnosis of either severe or non-severe malnutrition.    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     Relevant Medical History: HTN, HLD, DM2, iron deficiency, bipolar disorder, insomnia, dementia, and 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     Scheduled Medications:  aspirin, 81 mg, Daily  citalopram, 10 mg, Daily  empagliflozin, 25 mg, Daily  enoxparin, 40 mg, Daily  ferrous sulfate, 1 tablet, Daily  lisinopriL, 20 mg, Daily  metFORMIN, 1,000 mg, BID  mirtazapine, 15 mg, QHS  tamsulosin, 0.4 mg, Daily    Continuous Infusions:   PRN Medications:  acetaminophen, 650 mg, Q4H PRN  albuterol-ipratropium, 3 mL, Q6H PRN  aluminum-magnesium hydroxide-simethicone, 30 mL, QID PRN  bisacodyL, 10 mg, Daily PRN  dextrose 10%, 12.5 g, PRN  dextrose 10%, 25 g, PRN  glucagon (human recombinant), 1 mg, PRN  glucose, 16 g, PRN  glucose, 24 g, PRN  guaiFENesin 100 mg/5 ml, 200 mg, Q4H PRN  hydrALAZINE, 5 mg, Q4H PRN  HYDROcodone-acetaminophen, 1  "tablet, Q6H PRN  insulin aspart U-100, 0-10 Units, QID (AC + HS) PRN  loperamide, 2 mg, Q2H PRN  melatonin, 9 mg, Nightly PRN  morphine, 2 mg, Q6H PRN  naloxone, 0.02 mg, PRN  octreotide, 50 mcg, Q6H PRN  ondansetron, 8 mg, Q8H PRN  ondansetron, 4 mg, Q8H PRN  polyethylene glycol, 17 g, TID PRN  simethicone, 1 tablet, QID PRN  sodium chloride 0.9%, 10 mL, PRN    Calorie Containing IV Medications: no significant kcals from medications at this time    Recent Labs   Lab 05/31/24  0442 05/31/24  1256   * 135*   K 5.5* 4.6   CALCIUM 9.3 9.3   CO2 25 23   BUN 26.6* 26.3*   CREATININE 1.17 1.19*   EGFRNORACEVR >60 >60   GLUCOSE 109 111   WBC 6.87  --    HGB 11.5*  --    HCT 35.1*  --      Nutrition Orders:  Diet diabetic 2000 Calorie      Appetite/Oral Intake: good/% of meals  Factors Affecting Nutritional Intake: chewing difficulty  Social Needs Impacting Access to Food: none identified  Food/Buddhist/Cultural Preferences: none reported  Food Allergies: none reported  Last Bowel Movement: 06/03/24  Wound(s):      Comments  05/17/24: Pt intake is good. Will continue to monitor.      05/16/24:Pt intake is good. Pt states doesn't have dentures with him, chopping meal per patient request. Discussed food preferences. Marked menus.     05/21/24: Pt reports intake is good. Pt have no c/o of. Will continue to monitor. Chopping meal in kitchen per patient request.     05/28/24: Pt  intake is good. Discussed food preferences. Marked menus. No c/o of at this time. Will monitor. Pt reports doesn't need meal chopped anymore, has dentures with him.     06/03/24: Pt appetite is good. 100% of lunch, observed pastor. No c/o of at this time. Will monitor.   Anthropometrics    Height: 5' 8" (172.7 cm), Height Method: Stated  Last Weight: 73.7 kg (162 lb 7.7 oz) (06/03/24 1508), Weight Method: Bed Scale  BMI (Calculated): 24.7  BMI Classification: overweight (BMI 25-29.9)        Ideal Body Weight (IBW), Male: 154 lb     % Ideal " Body Weight, Male (lb): 105.51 %   Adjust IBW for amputation left BKA: 148.1 lb  Amputee BMI (kgm2) 27.18                       Usual Weight Provided By: patient denies unintentional weight loss    Wt Readings from Last 5 Encounters:   06/03/24 73.7 kg (162 lb 7.7 oz)   05/20/24 75.2 kg (165 lb 12.6 oz)   04/18/24 80.7 kg (178 lb)     Weight Change(s) Since Admission: -1.5 kg wt loss  Wt Readings from Last 1 Encounters:   06/03/24 1508 73.7 kg (162 lb 7.7 oz)   06/03/24 0500 73.7 kg (162 lb 7.7 oz)   05/28/24 1520 75.2 kg (165 lb 12.6 oz)   05/27/24 0445 75.2 kg (165 lb 12.6 oz)   05/21/24 1437 75.2 kg (165 lb 12.6 oz)   05/21/24 1137 75.2 kg (165 lb 12.6 oz)   Admit Weight: 75.2 kg (165 lb 12.6 oz) (05/21/24 1137), Weight Method: Bed Scale    Estimated Needs    Weight Used For Calorie Calculations: 73.7 kg (162 lb 7.7 oz)  Energy Calorie Requirements (kcal): 1,842.5 kcal (25 kcal/kg/CBW)  Energy Need Method: Kcal/kg  Weight Used For Protein Calculations: 73.7 kg (162 lb 7.7 oz)  Protein Requirements: 73.85 gm (1.0 gm/kg/CBW)  Fluid Requirements (mL): 1,842.5 mL (1mL /kcal)  CHO Requirement: 207 gm CHO per day     Enteral Nutrition     Patient not receiving enteral nutrition at this time.    Parenteral Nutrition     Patient not receiving parenteral nutrition support at this time.    Evaluation of Received Nutrient Intake    Calories: meeting estimated needs  Protein: meeting estimated needs    Patient Education     Not applicable.    Nutrition Diagnosis     PES: No nutrition diagnosis at this time    Nutrition Interventions     Intervention(s): general/healthful diet    Goal: Meet greater than 80% of nutritional needs by follow-up. (goal met)      Nutrition Goals & Monitoring     Dietitian will monitor: food and beverage intake, weight, weight change, electrolyte/renal panel, glucose/endocrine profile, and gastrointestinal profile  Discharge planning: continue diabetic 2,000 calorie diet  Nutrition Risk/Follow-Up:  low (follow-up in 5-7 days)   Please consult if re-assessment needed sooner.

## 2024-06-03 NOTE — PT/OT/SLP PROGRESS
Physical Therapy Treatment Note           Name: Josep Angulo    : 1950 (73 y.o.)  MRN: [id number]           TREATMENT SUMMARY AND RECOMMENDATIONS:    PT Received On: 24  PT Start Time: 930     PT Stop Time: 955  PT Total Time (min): 25 min     Subjective Assessment:  x No complaints  Lethargic    Awake, alert, cooperative  Uncooperative    Agitated  c/o pain    Appropriate  c/o fatigue    Confused  Treated at bedside     Emotionally labile x Treated in gym/dept.    Impulsive  Other:    Flat affect       Therapy Precautions:    Cognitive deficits  Spinal precautions    Collar - hard  Sternal precautions    Collar - soft   TLSO    Fall risk  LSO    Hip precautions - posterior  Knee immobilizer    Hip precautions - anterior  WBAT    Impaired communication  Partial weightbearing    Oxygen  TTWB    PEG tube  NWB    Visual deficits  Other:    Hearing deficits          Treatment Objectives:     Mobility Training:   Assist level Comments    Bed mobility     Transfer     Gait CGA Amb on firm surface with RW 30 ft x 2    Sit to stand transitions SBA WC pushups with B UE use 10 reps    Sitting balance     Standing balance      Wheelchair mobility     Car transfer     Other:          Therapeutic Exercise:   Exercise Sets Reps Comments   B LE exer sitting  1 20 Ankle pumps, TKE, abd/add, knee lifts    L LE exer standing 1 20 B UE supported- abd/add, knee lifts                    Additional Comments:  No issues noted     Assessment: Patient tolerated session well.    PT Plan: awaiting DC  Revisions made to plan of care: No    GOALS:   Multidisciplinary Problems       Physical Therapy Goals          Problem: Physical Therapy    Goal Priority Disciplines Outcome Goal Variances Interventions   Physical Therapy Goal     PT, PT/OT Progressing     Description: Description: Goals to be met by: Discharge       Patient will increase functional independence with mobility by performin. Sit to stand transfer  with Supervision  2. Bed to chair transfer with Supervision using Rolling Walker  3. Wheelchair propulsion x 300 feet with Modified [place] using bilateral uppper extremities.   4. Gait x 50 feet at SPV levels using RW with or without prosthesis (currently poor fitting and needs  follow op)                         Skilled PT Minutes Provided: 25   Communication with Treatment Team:     Equipment recommendations:       At end of treatment, patient remained:  x Up in chair     Up in wheelchair in room    In bed   x With alarm activated    Bed rails up   x Call bell in reach     Family/friends present    Restraints secured properly    In bathroom with CNA/RN notified    Nurse aware    In gym with therapist/tech    Other:

## 2024-06-03 NOTE — PLAN OF CARE
[facility]  Discharge Reassessment    Primary Care Provider: Sami Rothman MD    Expected Discharge Date: 6/3/2024    Reassessment (most recent)       Discharge Reassessment - 06/03/24 1442          Discharge Reassessment    Assessment Type Discharge Planning Reassessment     Did the patient's condition or plan change since previous assessment? No     Discharge Plan discussed with: Adult children     Communicated CIELO with patient/caregiver Date not available/Unable to determine     Discharge Plan A New Nursing Home placement - alf care facility     DME Needed Upon Discharge  none     Transition of Care Barriers None     Why the patient remains in the hospital Requires continued medical care        Post-Acute Status    Post-Acute Authorization Placement     Post-Acute Placement Status Pending payor review/awaiting authorization (if required)     Hospital Resources/Appts/Education Provided Provided patient/caregiver with written discharge plan information     Patient choice form signed by patient/caregiver List with quality metrics by geographic area provided     Discharge Delays None known at this time

## 2024-06-04 LAB
ANION GAP SERPL CALC-SCNC: 6 MEQ/L
BASOPHILS # BLD AUTO: 0.05 X10(3)/MCL
BASOPHILS NFR BLD AUTO: 0.8 %
BUN SERPL-MCNC: 21.6 MG/DL (ref 8.4–25.7)
CALCIUM SERPL-MCNC: 9.3 MG/DL (ref 8.8–10)
CHLORIDE SERPL-SCNC: 101 MMOL/L (ref 98–107)
CO2 SERPL-SCNC: 26 MMOL/L (ref 23–31)
CREAT SERPL-MCNC: 1.12 MG/DL (ref 0.73–1.18)
CREAT/UREA NIT SERPL: 19
EOSINOPHIL # BLD AUTO: 0.37 X10(3)/MCL (ref 0–0.9)
EOSINOPHIL NFR BLD AUTO: 5.6 %
ERYTHROCYTE [DISTWIDTH] IN BLOOD BY AUTOMATED COUNT: 14.3 % (ref 11.5–17)
GFR SERPLBLD CREATININE-BSD FMLA CKD-EPI: >60 ML/MIN/1.73/M2
GLUCOSE SERPL-MCNC: 110 MG/DL (ref 82–115)
GLUCOSE SERPL-MCNC: 112 MG/DL (ref 70–110)
GLUCOSE SERPL-MCNC: 145 MG/DL (ref 70–110)
HCT VFR BLD AUTO: 37.4 % (ref 42–52)
HGB BLD-MCNC: 12.2 G/DL (ref 14–18)
IMM GRANULOCYTES # BLD AUTO: 0.01 X10(3)/MCL (ref 0–0.04)
IMM GRANULOCYTES NFR BLD AUTO: 0.2 %
LYMPHOCYTES # BLD AUTO: 2.29 X10(3)/MCL (ref 0.6–4.6)
LYMPHOCYTES NFR BLD AUTO: 34.8 %
MCH RBC QN AUTO: 27.6 PG (ref 27–31)
MCHC RBC AUTO-ENTMCNC: 32.6 G/DL (ref 33–36)
MCV RBC AUTO: 84.6 FL (ref 80–94)
MONOCYTES # BLD AUTO: 0.48 X10(3)/MCL (ref 0.1–1.3)
MONOCYTES NFR BLD AUTO: 7.3 %
NEUTROPHILS # BLD AUTO: 3.38 X10(3)/MCL (ref 2.1–9.2)
NEUTROPHILS NFR BLD AUTO: 51.3 %
PLATELET # BLD AUTO: 339 X10(3)/MCL (ref 130–400)
PMV BLD AUTO: 10.3 FL (ref 7.4–10.4)
POCT GLUCOSE: 112 MG/DL (ref 70–110)
POCT GLUCOSE: 124 MG/DL (ref 70–110)
POCT GLUCOSE: 145 MG/DL (ref 70–110)
POCT GLUCOSE: 154 MG/DL (ref 70–110)
POTASSIUM SERPL-SCNC: 4.9 MMOL/L (ref 3.5–5.1)
POTASSIUM SERPL-SCNC: 5.2 MMOL/L (ref 3.5–5.1)
RBC # BLD AUTO: 4.42 X10(6)/MCL (ref 4.7–6.1)
SODIUM SERPL-SCNC: 133 MMOL/L (ref 136–145)
WBC # SPEC AUTO: 6.58 X10(3)/MCL (ref 4.5–11.5)

## 2024-06-04 PROCEDURE — 25000242 PHARM REV CODE 250 ALT 637 W/ HCPCS: Performed by: PHYSICIAN ASSISTANT

## 2024-06-04 PROCEDURE — 36415 COLL VENOUS BLD VENIPUNCTURE: CPT | Performed by: PHYSICIAN ASSISTANT

## 2024-06-04 PROCEDURE — 25000003 PHARM REV CODE 250: Performed by: PHYSICIAN ASSISTANT

## 2024-06-04 PROCEDURE — 97110 THERAPEUTIC EXERCISES: CPT

## 2024-06-04 PROCEDURE — 97116 GAIT TRAINING THERAPY: CPT | Mod: CQ

## 2024-06-04 PROCEDURE — 84132 ASSAY OF SERUM POTASSIUM: CPT | Performed by: PHYSICIAN ASSISTANT

## 2024-06-04 PROCEDURE — 63600175 PHARM REV CODE 636 W HCPCS: Performed by: PHYSICIAN ASSISTANT

## 2024-06-04 PROCEDURE — 85025 COMPLETE CBC W/AUTO DIFF WBC: CPT | Performed by: PHYSICIAN ASSISTANT

## 2024-06-04 PROCEDURE — 99900035 HC TECH TIME PER 15 MIN (STAT)

## 2024-06-04 PROCEDURE — 94760 N-INVAS EAR/PLS OXIMETRY 1: CPT

## 2024-06-04 PROCEDURE — 80048 BASIC METABOLIC PNL TOTAL CA: CPT | Performed by: PHYSICIAN ASSISTANT

## 2024-06-04 PROCEDURE — 11000004 HC SNF PRIVATE

## 2024-06-04 RX ORDER — AMLODIPINE BESYLATE 5 MG/1
10 TABLET ORAL DAILY
Status: DISCONTINUED | OUTPATIENT
Start: 2024-06-04 | End: 2024-06-07 | Stop reason: HOSPADM

## 2024-06-04 RX ADMIN — METFORMIN HYDROCHLORIDE 1000 MG: 500 TABLET ORAL at 08:06

## 2024-06-04 RX ADMIN — CITALOPRAM HYDROBROMIDE 10 MG: 10 TABLET ORAL at 09:06

## 2024-06-04 RX ADMIN — EMPAGLIFLOZIN 25 MG: 25 TABLET, FILM COATED ORAL at 09:06

## 2024-06-04 RX ADMIN — TAMSULOSIN HYDROCHLORIDE 0.4 MG: 0.4 CAPSULE ORAL at 09:06

## 2024-06-04 RX ADMIN — ASPIRIN 81 MG: 81 TABLET, COATED ORAL at 09:06

## 2024-06-04 RX ADMIN — ENOXAPARIN SODIUM 40 MG: 40 INJECTION SUBCUTANEOUS at 06:06

## 2024-06-04 RX ADMIN — FERROUS SULFATE TAB 325 MG (65 MG ELEMENTAL FE) 1 EACH: 325 (65 FE) TAB at 09:06

## 2024-06-04 RX ADMIN — LISINOPRIL 20 MG: 20 TABLET ORAL at 09:06

## 2024-06-04 RX ADMIN — MIRTAZAPINE 15 MG: 7.5 TABLET, FILM COATED ORAL at 08:06

## 2024-06-04 RX ADMIN — METFORMIN HYDROCHLORIDE 1000 MG: 500 TABLET ORAL at 09:06

## 2024-06-04 NOTE — PLAN OF CARE
Called and left message for daughterNellie, concerning bank statements for the nursing home. Awaiting call back .

## 2024-06-04 NOTE — PLAN OF CARE
Problem: Adult Inpatient Plan of Care  Goal: Plan of Care Review  Outcome: Progressing  Flowsheets (Taken 6/3/2024 1931)  Plan of Care Reviewed With: patient  Goal: Patient-Specific Goal (Individualized)  Outcome: Progressing  Flowsheets (Taken 6/3/2024 1931)  Individualized Care Needs: safetyand glucose monitoring ongoing  Anxieties, Fears or Concerns: none at this time     Problem: Diabetes Comorbidity  Goal: Blood Glucose Level Within Targeted Range  Outcome: Progressing  Intervention: Monitor and Manage Glycemia  Flowsheets (Taken 6/3/2024 1931)  Glycemic Management: blood glucose monitored     Problem: Fall Injury Risk  Goal: Absence of Fall and Fall-Related Injury  Outcome: Progressing  Intervention: Identify and Manage Contributors  Flowsheets (Taken 6/3/2024 1931)  Medication Review/Management: medications reviewed

## 2024-06-04 NOTE — PROGRESS NOTES
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 Daughter reports patient was unable to follow commands and falling asleep easily. Upon arrival to the ER, vitals revealed elevated temperature reading and mildly elevated BP. Labs were significant for leukocytosis, hypoglycemia with a glucose of 28, elevated CRP. Patient was given a D10 infusion with improvement of glucose to 126. CXR revealed prominent interstitial markings with no focal opacification, no acute cardiopulmonary process appreciated. CT head revealed no acute intracranial abnormality identified with chronic microvascular ischemic disease. Daughter at bedside brought patient's home medication bottles. Patient had 4 bottles of Metformin 1000mg BID which daughter reports patient only takes once daily and 3 bottles of Glimepiride 2mg BID. Daughter reports she gets help from a nurse who comes to her home to organize the patient's medication therefore she does not believe he could have taken too much medication. On exam, CBG registered as <20. He did receive a one time dose of Octreotide 50mcg and D5NS at 100cc/hr. Patient is being admitted to hospital medicine service.      His glucose improved after the dose of octreotide.  D5 NS was able to be discontinued.  He is now on metformin a 1000 mg b.i.d. and tolerating well.  Blood pressure was noted to be elevated and home lisinopril has been increased to 20 mg daily starting today.  Patient is being transitioned to our swing unit for continued therapy while awaiting placement at nursing home.    Today:  No reported complaints today.  Potassium elevated again today. Changing Lisinopril to Norvasc.     OBJECTIVE/PHYSICAL EXAM     VITAL SIGNS (MOST RECENT):  Temp: 97.6 °F (36.4 °C) (06/04/24 0726)  Pulse: 79 (06/04/24 0726)  Resp: 18 (06/03/24 1908)  BP: 129/77 (06/04/24 0726)  SpO2: 100 % (06/04/24 0726) VITAL SIGNS (24 HOUR RANGE):  Temp:  [97.6 °F (36.4 °C)-97.9 °F (36.6 °C)] 97.6 °F (36.4 °C)  Pulse:  [79-86] 79  Resp:  [18] 18  SpO2:  [93 %-100 %] 100 %  BP: (116-129)/(71-77) 129/77     GENERAL: In no acute distress, afebrile  HEENT: Atraumatic, normocephalic, moist mucous membranes   CHEST: Clear to auscultation bilaterally  HEART: S1, S2, no appreciable murmur  ABDOMEN: Soft, nontender, BS +  MSK: Warm, no lower extremity edema, left BKA   NEUROLOGIC: Alert and oriented, moving all extremities with good strength   INTEGUMENTARY: See media for right great toe   PSYCHIATRY: Appropriate mood and affect     ASSESSMENT/PLAN     Sulfonylurea induced hypoglycemia   Uncontrolled DM2   Hold home Glimepiride   Received D10 in ER, Octreotide 50mcg  A1c 6%   Continue Metformin to 1000mg BID and Jardiance 25mg QD      Acute metabolic encephalopathy - resolved   2/2 hypoglycemia and receiving sedating medications in the ER   Limit sedating medications   CT head revealed no acute intracranial abnormality identified with chronic microvascular ischemic disease     Leukocytosis - resolved   UA without UTI   CXR without acute findings     Covid, flu, RSV negative      HTN  Change Lisinopril 20mg to Norvasc 10mg 06/04/2024 due to hyperkalemia      BPH  Continue home Flomax     Iron deficiency   Continue home iron supplementation     Bipolar disorder, insomnia, dementia  Continue home Mirtazapine   Daughter requesting NH placement at [facility] Anni Conklin CM consulted      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   PT/OT - needs appointment with [facility] due to poorly fitted prosthetic     Hyperkalemia   Required 1 dose of Kayexalate  Awaiting repeat labs      DVT  "prophylaxis:  Lovenox     Anticipated discharge and disposition: Awaiting placement  __________________________________________________________________________    LABS/MICRO/MEDS/DIAGNOSTICS     LABS  Recent Labs     06/04/24  0441   *   K 5.2*   CO2 26   BUN 21.6   CREATININE 1.12   GLUCOSE 110   CALCIUM 9.3     Recent Labs     06/04/24  0441   WBC 6.58   RBC 4.42*   HCT 37.4*   MCV 84.6        MICROBIOLOGY  Microbiology Results (last 7 days)       ** No results found for the last 168 hours. **             MEDICATIONS   amLODIPine  10 mg Oral Daily    aspirin  81 mg Oral Daily    citalopram  10 mg Oral Daily    empagliflozin  25 mg Oral Daily    enoxparin  40 mg Subcutaneous Daily    ferrous sulfate  1 tablet Oral Daily    metFORMIN  1,000 mg Oral BID    mirtazapine  15 mg Oral QHS    tamsulosin  0.4 mg Oral Daily         INFUSIONS       DIAGNOSTIC TESTS  No orders to display      No results found for: "EF"     NUTRITION STATUS  Patient meets ASPEN criteria for   malnutrition of   per RD assessment as evidenced by:                       A minimum of two characteristics is recommended for diagnosis of either severe or non-severe malnutrition.     Case related differential diagnoses have been reviewed; assessment and plan has been documented. I have personally reviewed the labs and test results that are currently available; I have reviewed the patients medication list. I have reviewed the consulting providers recommendations. I have reviewed or attempted to review medical records based upon their availability.  All of the patient's and/or family's questions have been addressed and answered to the best of my ability.  I will continue to monitor closely and make adjustments to medical management as needed.  This document was created using M*Modal Fluency Direct.  Transcription errors may have been made.  Please contact me if any questions may rise regarding documentation to clarify transcription.      Kendy" ALICE Calhoun   Internal Medicine  Department of Hospital Medicine [facility]

## 2024-06-04 NOTE — PLAN OF CARE
Problem: Adult Inpatient Plan of Care  Goal: Plan of Care Review  Outcome: Progressing  Flowsheets (Taken 6/4/2024 0800)  Plan of Care Reviewed With: patient  Goal: Patient-Specific Goal (Individualized)  Outcome: Progressing  Flowsheets (Taken 6/4/2024 0800)  Individualized Care Needs: Safety and glucose monitoring ongoing  Anxieties, Fears or Concerns: None at this time  Patient/Family-Specific Goals (Include Timeframe): Improved condition by CIELO for discharge to healthcare facility  Goal: Absence of Hospital-Acquired Illness or Injury  Outcome: Progressing  Goal: Optimal Comfort and Wellbeing  Outcome: Progressing  Goal: Readiness for Transition of Care  Outcome: Progressing     Problem: Diabetes Comorbidity  Goal: Blood Glucose Level Within Targeted Range  Outcome: Progressing     Problem: Fall Injury Risk  Goal: Absence of Fall and Fall-Related Injury  Outcome: Progressing     Problem: Wound  Goal: Optimal Coping  Outcome: Progressing  Goal: Optimal Functional Ability  Outcome: Progressing  Goal: Optimal Pain Control and Function  Outcome: Progressing  Goal: Skin Health and Integrity  Outcome: Progressing  Goal: Optimal Wound Healing  Outcome: Progressing     Problem: Skin Injury Risk Increased  Goal: Skin Health and Integrity  Outcome: Progressing

## 2024-06-04 NOTE — PT/OT/SLP PROGRESS
Occupational Therapy  Treatment    Name: Josep Angulo    : 1950 (73 y.o.)  MRN: [id number]           TREATMENT SUMMARY AND RECOMMENDATIONS:      OT Date of Treatment: 24  OT Start Time: 900  OT Stop Time: 925  OT Total Time (min): 25 min      Subjective Assessment:   No complaints  Lethargic   x Awake, alert, cooperative  Impulsive    Uncooperative   Flat affect    Agitated  c/o pain    Appropriate  c/o fatigue    Confused x Treated at bedside     Emotionally labile x Treated in gym/dept.      Other:        Therapy Precautions:    Cognitive deficits  Spinal precautions    Collar - hard  Sternal precautions    Collar - soft   TLSO   x Fall risk  LSO    Hip precautions - posterior  Knee immobilizer    Hip precautions - anterior  WBAT    Impaired communication  Partial weightbearing    Oxygen  TTWB    PEG tube  NWB    Visual deficits      Hearing deficits   Other:        Treatment Objectives:     Mobility Training:    Mobility task Assist level Comments    Bed mobility Mod (I)  Supine>EOB   Transfer SBA Modified squat/pivot t/f EOB>w/c   Sit to stands transitions     Functional mobility     Sitting balance     Standing balance  good Pt stood with RW for toileting task with SBA/CGA with no LOB    Other:        ADL Training:    ADL Assist level Comments    Feeding     Grooming/hygiene     Bathing     Upper body dressing     Lower body dressing CGA Pt able to priya briefs   Toileting CGA Pt able to perform clothing management and britney hygiene in standing    Toilet transfer     Adaptive equipment training     Other:           Therapeutic Exercise:   Exercise Sets Reps Comments   3# dowel 1 20 Shoulder press, chest press, straight arm raises, bicep curls, forward rows, backwards rows   Red flexbar 1 20 Wrist flex/ext, forearm pro/sup   Sit to stands 2 5 SBA from w/c level with RW anterior              Additional Comments:  Awaiting daughter to submit financials for NH placement.     Assessment: Patient  tolerated session well. Pt had positive response to today's treatment. Pt continues to make good progress towards goals. Pt would continue to benefit from skilled OT services to improve pt's safety and independence with daily occupations, decrease caregiver burden, and reduce pt's risk of falls.     GOALS:   Multidisciplinary Problems       Occupational Therapy Goals          Problem: Occupational Therapy    Goal Priority Disciplines Outcome Interventions   Occupational Therapy Goal     OT, PT/OT Progressing    Description: Goals to be met by: 6/4/24     Patient will increase functional independence with ADLs by performing:    LE Dressing with Contact Guard Assistance.  Grooming while standing at sink with Modified [place].  Toileting from toilet with Contact Guard Assistance for hygiene and clothing management.   Bathing from  shower chair/bench with Contact Guard Assistance.  Toilet transfer to toilet with Stand-by Assistance.                         Recommendations:     Discharge Equipment Recommendations:  none     Plan:     Patient to be seen 6 x/week to address the above listed problems via self-care/home management, therapeutic activities, therapeutic exercises, wheelchair management/training  Plan of Care Expires: 06/04/24  Plan of Care Reviewed with: patient  Revisions made to plan of care: No      Skilled OT Minutes Provided: 25  Communication with Treatment Team:     Equipment recommendations:       At end of treatment, patient remained:  x Up in chair     Up in wheelchair in room    In bed   x With alarm activated    Bed rails up   x Call bell in reach     Family/friends present    Restraints secured properly    In bathroom with CNA/RN notified    In gym with PT/PTA/tech    Nurse aware    Other:

## 2024-06-04 NOTE — PT/OT/SLP PROGRESS
Physical Therapy Treatment Note           Name: Josep Angulo    : 1950 (73 y.o.)  MRN: [id number]           TREATMENT SUMMARY AND RECOMMENDATIONS:    PT Received On: 24  PT Start Time: 1400     PT Stop Time: 1415  PT Total Time (min): 15 min     Subjective Assessment:  x No complaints  Lethargic    Awake, alert, cooperative  Uncooperative    Agitated  c/o pain    Appropriate  c/o fatigue    Confused  Treated at bedside     Emotionally labile  Treated in gym/dept.    Impulsive  Other:    Flat affect       Therapy Precautions:    Cognitive deficits  Spinal precautions    Collar - hard  Sternal precautions    Collar - soft   TLSO    Fall risk  LSO    Hip precautions - posterior  Knee immobilizer    Hip precautions - anterior  WBAT    Impaired communication  Partial weightbearing    Oxygen  TTWB    PEG tube  NWB    Visual deficits  Other:    Hearing deficits          Treatment Objectives:     Mobility Training:   Assist level Comments    Bed mobility     Transfer     Gait CGA Amb on firm surface with RW 40 ft x 3   Sit to stand transitions     Sitting balance     Standing balance      Wheelchair mobility SBA WC mobility with B UE use 150 ft    Car transfer     Other:          Therapeutic Exercise:   Exercise Sets Reps Comments                               Additional Comments:  No issues noted     Assessment: Patient tolerated session well    PT Plan: ready for DC  Revisions made to plan of care: No    GOALS:   Multidisciplinary Problems       Physical Therapy Goals          Problem: Physical Therapy    Goal Priority Disciplines Outcome Goal Variances Interventions   Physical Therapy Goal     PT, PT/OT Progressing     Description: Description: Goals to be met by: Discharge       Patient will increase functional independence with mobility by performin. Sit to stand transfer with Supervision  2. Bed to chair transfer with Supervision using Rolling Walker  3. Wheelchair propulsion x 300 feet  with Modified [place] using bilateral uppper extremities.   4. Gait x 50 feet at SPV levels using RW with or without prosthesis (currently poor fitting and needs  follow op)                         Skilled PT Minutes Provided: 15   Communication with Treatment Team:     Equipment recommendations:       At end of treatment, patient remained:  x Up in chair     Up in wheelchair in room    In bed   x With alarm activated    Bed rails up   x Call bell in reach     Family/friends present    Restraints secured properly    In bathroom with CNA/RN notified    Nurse aware    In gym with therapist/tech    Other:

## 2024-06-05 LAB
GLUCOSE SERPL-MCNC: 132 MG/DL (ref 70–110)
GLUCOSE SERPL-MCNC: 149 MG/DL (ref 70–110)
POCT GLUCOSE: 129 MG/DL (ref 70–110)
POCT GLUCOSE: 132 MG/DL (ref 70–110)
POCT GLUCOSE: 149 MG/DL (ref 70–110)

## 2024-06-05 PROCEDURE — 94760 N-INVAS EAR/PLS OXIMETRY 1: CPT

## 2024-06-05 PROCEDURE — 63600175 PHARM REV CODE 636 W HCPCS: Performed by: PHYSICIAN ASSISTANT

## 2024-06-05 PROCEDURE — 97116 GAIT TRAINING THERAPY: CPT | Mod: CQ

## 2024-06-05 PROCEDURE — 25000003 PHARM REV CODE 250: Performed by: PHYSICIAN ASSISTANT

## 2024-06-05 PROCEDURE — 99900035 HC TECH TIME PER 15 MIN (STAT)

## 2024-06-05 PROCEDURE — 97535 SELF CARE MNGMENT TRAINING: CPT

## 2024-06-05 PROCEDURE — 11000004 HC SNF PRIVATE

## 2024-06-05 PROCEDURE — 25000242 PHARM REV CODE 250 ALT 637 W/ HCPCS: Performed by: PHYSICIAN ASSISTANT

## 2024-06-05 PROCEDURE — 97110 THERAPEUTIC EXERCISES: CPT | Mod: CQ

## 2024-06-05 RX ADMIN — EMPAGLIFLOZIN 25 MG: 25 TABLET, FILM COATED ORAL at 08:06

## 2024-06-05 RX ADMIN — FERROUS SULFATE TAB 325 MG (65 MG ELEMENTAL FE) 1 EACH: 325 (65 FE) TAB at 08:06

## 2024-06-05 RX ADMIN — ENOXAPARIN SODIUM 40 MG: 40 INJECTION SUBCUTANEOUS at 05:06

## 2024-06-05 RX ADMIN — MIRTAZAPINE 15 MG: 7.5 TABLET, FILM COATED ORAL at 08:06

## 2024-06-05 RX ADMIN — METFORMIN HYDROCHLORIDE 1000 MG: 500 TABLET ORAL at 08:06

## 2024-06-05 RX ADMIN — TAMSULOSIN HYDROCHLORIDE 0.4 MG: 0.4 CAPSULE ORAL at 08:06

## 2024-06-05 RX ADMIN — AMLODIPINE BESYLATE 10 MG: 5 TABLET ORAL at 08:06

## 2024-06-05 RX ADMIN — ASPIRIN 81 MG: 81 TABLET, COATED ORAL at 08:06

## 2024-06-05 RX ADMIN — CITALOPRAM HYDROBROMIDE 10 MG: 10 TABLET ORAL at 08:06

## 2024-06-05 NOTE — PATIENT CARE CONFERENCE
Name: Josep Angulo    : 1950 (73 y.o.)  MRN: [id number]            Interdisciplinary Team Conference     Case conference held with patient/family and care team to discuss progress, plan of care, barriers to be addressed for safe return home, equipment recommendations, and discharge planning. Communicated therapy progress with MD, RN, therapy clinicians and case management. All questions/concerns answered.

## 2024-06-05 NOTE — PLAN OF CARE
Problem: Adult Inpatient Plan of Care  Goal: Plan of Care Review  Outcome: Progressing  Goal: Patient-Specific Goal (Individualized)  Outcome: Progressing  Flowsheets (Taken 6/5/2024 1714)  Individualized Care Needs: safety with mobility, glucose monitoring  Anxieties, Fears or Concerns: none expressed  Goal: Absence of Hospital-Acquired Illness or Injury  Outcome: Progressing  Intervention: Identify and Manage Fall Risk  Flowsheets (Taken 6/5/2024 1714)  Safety Promotion/Fall Prevention:   assistive device/personal item within reach   bed alarm set   nonskid shoes/socks when out of bed   side rails raised x 2  Goal: Optimal Comfort and Wellbeing  Outcome: Progressing  Goal: Readiness for Transition of Care  Outcome: Progressing     Problem: Diabetes Comorbidity  Goal: Blood Glucose Level Within Targeted Range  Outcome: Progressing  Intervention: Monitor and Manage Glycemia  Flowsheets (Taken 6/5/2024 1714)  Glycemic Management: blood glucose monitored     Problem: Fall Injury Risk  Goal: Absence of Fall and Fall-Related Injury  Outcome: Progressing  Intervention: Identify and Manage Contributors  Flowsheets (Taken 6/5/2024 1714)  Self-Care Promotion:   adaptive equipment use encouraged   independence encouraged  Medication Review/Management: medications reviewed     Problem: Wound  Goal: Optimal Coping  Outcome: Progressing  Goal: Optimal Functional Ability  Outcome: Progressing  Goal: Optimal Pain Control and Function  Outcome: Progressing  Goal: Skin Health and Integrity  Outcome: Progressing  Goal: Optimal Wound Healing  Outcome: Progressing     Problem: Skin Injury Risk Increased  Goal: Skin Health and Integrity  Outcome: Progressing  Intervention: Optimize Skin Protection  Flowsheets (Taken 6/5/2024 1714)  Pressure Reduction Techniques: frequent weight shift encouraged  Skin Protection: incontinence pads utilized  Activity Management: Walk with assistive devise and /or staff member - L3  Head of Bed (HOB)  Positioning: [facility] at 30 degrees

## 2024-06-05 NOTE — PATIENT CARE CONFERENCE
Name: Josep Angulo    : 1950 (73 y.o.)  MRN: [id number]            Interdisciplinary Team Conference     Case conference held with patient/family and care team to discuss progress, plan of care, barriers to be addressed for safe return home, equipment recommendations, and discharge planning. Communicated therapy progress with MD, RN, therapy clinicians and case management. All questions/concerns answered.

## 2024-06-05 NOTE — PLAN OF CARE
Problem: Adult Inpatient Plan of Care  Goal: Plan of Care Review  Outcome: Progressing  Flowsheets (Taken 6/4/2024 2003)  Plan of Care Reviewed With: patient  Goal: Patient-Specific Goal (Individualized)  Outcome: Progressing  Flowsheets (Taken 6/4/2024 2003)  Individualized Care Needs: safety and glucose monitoring ongoing  Anxieties, Fears or Concerns: none at this time     Problem: Diabetes Comorbidity  Goal: Blood Glucose Level Within Targeted Range  Outcome: Progressing  Intervention: Monitor and Manage Glycemia  Flowsheets (Taken 6/4/2024 2003)  Glycemic Management: blood glucose monitored     Problem: Fall Injury Risk  Goal: Absence of Fall and Fall-Related Injury  Outcome: Progressing  Intervention: Identify and Manage Contributors  Flowsheets (Taken 6/4/2024 2003)  Medication Review/Management: medications reviewed

## 2024-06-05 NOTE — PT/OT/SLP PROGRESS
Physical Therapy Treatment Note           Name: Josep Angulo    : 1950 (73 y.o.)  MRN: [id number]           TREATMENT SUMMARY AND RECOMMENDATIONS:    PT Received On: 24  PT Start Time: 930     PT Stop Time: 955  PT Total Time (min): 25 min     Subjective Assessment:  x No complaints  Lethargic    Awake, alert, cooperative  Uncooperative    Agitated  c/o pain    Appropriate  c/o fatigue    Confused  Treated at bedside     Emotionally labile x Treated in gym/dept.    Impulsive  Other:    Flat affect       Therapy Precautions:    Cognitive deficits  Spinal precautions    Collar - hard  Sternal precautions    Collar - soft   TLSO   x Fall risk  LSO    Hip precautions - posterior  Knee immobilizer    Hip precautions - anterior  WBAT    Impaired communication  Partial weightbearing    Oxygen  TTWB    PEG tube  NWB    Visual deficits  Other:    Hearing deficits          Treatment Objectives:     Mobility Training:   Assist level Comments    Bed mobility [place] Supine-sit   Transfer     Gait CGA Amb on firm surface with RW 40 ft x 2    Sit to stand transitions CGA Sit-stand 10 reps with B UE use   Sitting balance     Standing balance      Wheelchair mobility [place] WC mobility with B UE use 150 ft    Car transfer     Other:          Therapeutic Exercise:   Exercise Sets Reps Comments   B LE exer sitting  1 20 2# B ankle wts- ankle pumps, TKE, abd/add, knee lifts    L LE exer standing  1 20 B UE supported- abd/add, knee lifts                    Additional Comments:  No issues noted     Assessment: Patient tolerated session pt awaiting NH placement.    PT Plan: continue  Revisions made to plan of care: No    GOALS:   Multidisciplinary Problems       Physical Therapy Goals          Problem: Physical Therapy    Goal Priority Disciplines Outcome Goal Variances Interventions   Physical Therapy Goal     PT, PT/OT Progressing     Description: Description: Goals to be met by: Discharge       Patient will  increase functional independence with mobility by performin. Sit to stand transfer with Supervision  2. Bed to chair transfer with Supervision using Rolling Walker  3. Wheelchair propulsion x 300 feet with Modified [place] using bilateral uppper extremities.   4. Gait x 50 feet at SPV levels using RW with or without prosthesis (currently poor fitting and needs  follow op)                         Skilled PT Minutes Provided: 25   Communication with Treatment Team:     Equipment recommendations:       At end of treatment, patient remained:   Up in chair     Up in wheelchair in room    In bed    With alarm activated    Bed rails up    Call bell in reach     Family/friends present    Restraints secured properly    In bathroom with CNA/RN notified    Nurse aware   x In gym with therapist/tech    Other:

## 2024-06-05 NOTE — PT/OT/SLP PROGRESS
Occupational Therapy  Treatment    Name: Josep Angulo    : 1950 (73 y.o.)  MRN: [id number]           TREATMENT SUMMARY AND RECOMMENDATIONS:      OT Date of Treatment: 24  OT Start Time: 1000  OT Stop Time: 1015  OT Total Time (min): 15 min      Subjective Assessment:  x No complaints  Lethargic   x Awake, alert, cooperative  Impulsive    Uncooperative   Flat affect    Agitated  c/o pain    Appropriate  c/o fatigue    Confused x Treated at bedside     Emotionally labile  Treated in gym/dept.      Other:        Therapy Precautions:    Cognitive deficits  Spinal precautions    Collar - hard  Sternal precautions    Collar - soft   TLSO   x Fall risk  LSO    Hip precautions - posterior  Knee immobilizer    Hip precautions - anterior  WBAT    Impaired communication  Partial weightbearing    Oxygen  TTWB    PEG tube  NWB    Visual deficits      Hearing deficits   Other:        Treatment Objectives:     Mobility Training:    Mobility task Assist level Comments    Bed mobility     Transfer SBA Modified squat/pivot t/f w/c>BSchair   Sit to stands transitions     Functional mobility     Sitting balance     Standing balance      Other:        ADL Training:    ADL Assist level Comments    Feeding     Grooming/hygiene Mod (I)  Pt performed hand hygiene while seated in w/c at sink    Bathing     Upper body dressing     Lower body dressing     Toileting SBA/CGA (+) void (+) BM   Toilet transfer SBA/CGA Stand/pivot t/f w/c<>toilet with GB use   Adaptive equipment training     Other:           Therapeutic Exercise:   Exercise Sets Reps Comments   Arm bike 1 1 min Level 1; forwards  Exercise terminated d/t Pt needing bathroom                         Assessment: Patient tolerated session well. Pt had positive response to today's treatment. Pt continues to make good progress towards goals. Pt would continue to benefit from skilled OT services to improve pt's safety and independence with daily occupations, decrease  caregiver burden, and reduce pt's risk of falls.     GOALS:   Multidisciplinary Problems       Occupational Therapy Goals          Problem: Occupational Therapy    Goal Priority Disciplines Outcome Interventions   Occupational Therapy Goal     OT, PT/OT Progressing    Description: Goals to be met by: 6/4/24     Patient will increase functional independence with ADLs by performing:    LE Dressing with Contact Guard Assistance.  Grooming while standing at sink with Modified [place].  Toileting from toilet with Contact Guard Assistance for hygiene and clothing management.   Bathing from  shower chair/bench with Contact Guard Assistance.  Toilet transfer to toilet with Stand-by Assistance.                         Recommendations:     Discharge Equipment Recommendations:  none     Plan:     Patient to be seen 6 x/week to address the above listed problems via self-care/home management, therapeutic activities, therapeutic exercises, wheelchair management/training  Plan of Care Expires: 06/04/24  Plan of Care Reviewed with: patient  Revisions made to plan of care: No      Skilled OT Minutes Provided: 15  Communication with Treatment Team:     Equipment recommendations:       At end of treatment, patient remained:  x Up in chair     Up in wheelchair in room    In bed   x With alarm activated    Bed rails up   x Call bell in reach     Family/friends present    Restraints secured properly    In bathroom with CNA/RN notified    In gym with PT/PTA/tech    Nurse aware    Other:

## 2024-06-05 NOTE — PROGRESS NOTES
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 Daughter reports patient was unable to follow commands and falling asleep easily. Upon arrival to the ER, vitals revealed elevated temperature reading and mildly elevated BP. Labs were significant for leukocytosis, hypoglycemia with a glucose of 28, elevated CRP. Patient was given a D10 infusion with improvement of glucose to 126. CXR revealed prominent interstitial markings with no focal opacification, no acute cardiopulmonary process appreciated. CT head revealed no acute intracranial abnormality identified with chronic microvascular ischemic disease. Daughter at bedside brought patient's home medication bottles. Patient had 4 bottles of Metformin 1000mg BID which daughter reports patient only takes once daily and 3 bottles of Glimepiride 2mg BID. Daughter reports she gets help from a nurse who comes to her home to organize the patient's medication therefore she does not believe he could have taken too much medication. On exam, CBG registered as <20. He did receive a one time dose of Octreotide 50mcg and D5NS at 100cc/hr. Patient is being admitted to hospital medicine service.      His glucose improved after the dose of octreotide.  D5 NS was able to be discontinued.  He is now on metformin a 1000 mg b.i.d. and tolerating well.  Blood pressure was noted to be elevated and home lisinopril has been increased to 20 mg daily starting today.  Patient is being transitioned to our swing unit for continued therapy while awaiting placement at nursing home.    Today:  No reported complaints today.      OBJECTIVE/PHYSICAL EXAM     VITAL SIGNS (MOST RECENT):  Temp: 97.7 °F (36.5 °C) (06/05/24 0705)  Pulse: 73 (06/05/24 0810)  Resp: 18 (06/05/24 0810)  BP: (!) 143/84 (06/05/24 0705)  SpO2: 99 % (06/05/24 0810) VITAL SIGNS (24 HOUR RANGE):  Temp:  [97.6 °F (36.4 °C)-97.7 °F (36.5 °C)] 97.7 °F (36.5 °C)  Pulse:  [73-87] 73  Resp:  [18] 18  SpO2:  [97 %-99 %] 99 %  BP: (113-143)/(71-84) 143/84     GENERAL: In no acute distress, afebrile  HEENT: Atraumatic, normocephalic, moist mucous membranes   CHEST: Clear to auscultation bilaterally  HEART: S1, S2, no appreciable murmur  ABDOMEN: Soft, nontender, BS +  MSK: Warm, no lower extremity edema, left BKA   NEUROLOGIC: Alert and oriented, moving all extremities with good strength   INTEGUMENTARY: See media for right great toe   PSYCHIATRY: Appropriate mood and affect     ASSESSMENT/PLAN     Sulfonylurea induced hypoglycemia   Uncontrolled DM2   Hold home Glimepiride   Received D10 in ER, Octreotide 50mcg  A1c 6%   Continue Metformin to 1000mg BID and Jardiance 25mg QD      Acute metabolic encephalopathy - resolved   2/2 hypoglycemia and receiving sedating medications in the ER   Limit sedating medications   CT head revealed no acute intracranial abnormality identified with chronic microvascular ischemic disease     Leukocytosis - resolved   UA without UTI   CXR without acute findings     Covid, flu, RSV negative      HTN  Change Lisinopril 20mg to Norvasc 10mg 06/04/2024 due to hyperkalemia      BPH  Continue home Flomax     Iron deficiency   Continue home iron supplementation     Bipolar disorder, insomnia, dementia  Continue home Mirtazapine   Daughter requesting NH placement at [facility] -  consulted      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   PT/OT - needs appointment with [facility] due to poorly fitted prosthetic     Hyperkalemia   Required 1 dose of Kayexalate     DVT prophylaxis:  Lovenox     Anticipated discharge and disposition: Awaiting  "placement  __________________________________________________________________________    LABS/MICRO/MEDS/DIAGNOSTICS     LABS  Recent Labs     06/04/24  0441 06/04/24  1212   *  --    K 5.2* 4.9   CO2 26  --    BUN 21.6  --    CREATININE 1.12  --    GLUCOSE 110  --    CALCIUM 9.3  --      Recent Labs     06/04/24  0441   WBC 6.58   RBC 4.42*   HCT 37.4*   MCV 84.6        MICROBIOLOGY  Microbiology Results (last 7 days)       ** No results found for the last 168 hours. **             MEDICATIONS   amLODIPine  10 mg Oral Daily    aspirin  81 mg Oral Daily    citalopram  10 mg Oral Daily    empagliflozin  25 mg Oral Daily    enoxparin  40 mg Subcutaneous Daily    ferrous sulfate  1 tablet Oral Daily    metFORMIN  1,000 mg Oral BID    mirtazapine  15 mg Oral QHS    tamsulosin  0.4 mg Oral Daily         INFUSIONS       DIAGNOSTIC TESTS  No orders to display      No results found for: "EF"     NUTRITION STATUS  Patient meets ASPEN criteria for   malnutrition of   per RD assessment as evidenced by:                       A minimum of two characteristics is recommended for diagnosis of either severe or non-severe malnutrition.     Case related differential diagnoses have been reviewed; assessment and plan has been documented. I have personally reviewed the labs and test results that are currently available; I have reviewed the patients medication list. I have reviewed the consulting providers recommendations. I have reviewed or attempted to review medical records based upon their availability.  All of the patient's and/or family's questions have been addressed and answered to the best of my ability.  I will continue to monitor closely and make adjustments to medical management as needed.  This document was created using M*Modal Fluency Direct.  Transcription errors may have been made.  Please contact me if any questions may rise regarding documentation to clarify transcription.      ALICE Hilton   Internal " Medicine  Department of Hospital Medicine [facility] - Medical Surgical Unit

## 2024-06-06 LAB
GLUCOSE SERPL-MCNC: 120 MG/DL (ref 70–110)
POCT GLUCOSE: 120 MG/DL (ref 70–110)
POCT GLUCOSE: 126 MG/DL (ref 70–110)
POCT GLUCOSE: 135 MG/DL (ref 70–110)

## 2024-06-06 PROCEDURE — 97110 THERAPEUTIC EXERCISES: CPT

## 2024-06-06 PROCEDURE — 25000003 PHARM REV CODE 250: Performed by: PHYSICIAN ASSISTANT

## 2024-06-06 PROCEDURE — 99900035 HC TECH TIME PER 15 MIN (STAT)

## 2024-06-06 PROCEDURE — 97110 THERAPEUTIC EXERCISES: CPT | Mod: CQ

## 2024-06-06 PROCEDURE — 25000242 PHARM REV CODE 250 ALT 637 W/ HCPCS: Performed by: PHYSICIAN ASSISTANT

## 2024-06-06 PROCEDURE — 11000004 HC SNF PRIVATE

## 2024-06-06 PROCEDURE — 97116 GAIT TRAINING THERAPY: CPT | Mod: CQ

## 2024-06-06 PROCEDURE — 97530 THERAPEUTIC ACTIVITIES: CPT

## 2024-06-06 PROCEDURE — 63600175 PHARM REV CODE 636 W HCPCS: Performed by: PHYSICIAN ASSISTANT

## 2024-06-06 PROCEDURE — 94760 N-INVAS EAR/PLS OXIMETRY 1: CPT

## 2024-06-06 RX ADMIN — ENOXAPARIN SODIUM 40 MG: 40 INJECTION SUBCUTANEOUS at 04:06

## 2024-06-06 RX ADMIN — MIRTAZAPINE 15 MG: 7.5 TABLET, FILM COATED ORAL at 08:06

## 2024-06-06 RX ADMIN — METFORMIN HYDROCHLORIDE 1000 MG: 500 TABLET ORAL at 08:06

## 2024-06-06 RX ADMIN — AMLODIPINE BESYLATE 10 MG: 5 TABLET ORAL at 09:06

## 2024-06-06 RX ADMIN — ASPIRIN 81 MG: 81 TABLET, COATED ORAL at 09:06

## 2024-06-06 RX ADMIN — INSULIN ASPART 1 UNITS: 100 INJECTION, SOLUTION INTRAVENOUS; SUBCUTANEOUS at 08:06

## 2024-06-06 RX ADMIN — METFORMIN HYDROCHLORIDE 1000 MG: 500 TABLET ORAL at 09:06

## 2024-06-06 RX ADMIN — CITALOPRAM HYDROBROMIDE 10 MG: 10 TABLET ORAL at 09:06

## 2024-06-06 RX ADMIN — FERROUS SULFATE TAB 325 MG (65 MG ELEMENTAL FE) 1 EACH: 325 (65 FE) TAB at 09:06

## 2024-06-06 RX ADMIN — TAMSULOSIN HYDROCHLORIDE 0.4 MG: 0.4 CAPSULE ORAL at 09:06

## 2024-06-06 RX ADMIN — EMPAGLIFLOZIN 25 MG: 25 TABLET, FILM COATED ORAL at 09:06

## 2024-06-06 NOTE — PT/OT/SLP PROGRESS
Occupational Therapy  Treatment    Name: oJsep Angulo    : 1950 (73 y.o.)  MRN: [id number]           TREATMENT SUMMARY AND RECOMMENDATIONS:      OT Date of Treatment: 24  OT Start Time: 900  OT Stop Time: 930  OT Total Time (min): 30 min      Subjective Assessment:  x No complaints  Lethargic   x Awake, alert, cooperative  Impulsive    Uncooperative   Flat affect    Agitated  c/o pain    Appropriate  c/o fatigue    Confused  Treated at bedside     Emotionally labile x Treated in gym/dept.      Other:        Therapy Precautions:    Cognitive deficits  Spinal precautions    Collar - hard  Sternal precautions    Collar - soft   TLSO   x Fall risk  LSO    Hip precautions - posterior  Knee immobilizer    Hip precautions - anterior  WBAT    Impaired communication  Partial weightbearing    Oxygen  TTWB    PEG tube  NWB    Visual deficits      Hearing deficits   Other:        Treatment Objectives:     Mobility Training:    Mobility task Assist level Comments    Bed mobility Mod (I)  Supine>EOB   Transfer SBA Modified squat/pivot t/f EOB>w/c   Sit to stands transitions SBA From w/c level with RW anterior    Functional mobility     Sitting balance     Standing balance  CGA Pt stood with RW anterior; performed mini squat to low stool to grasp bean bags and then toss into bucket anterior. Performed with RUE f/b LUE with seated rest break between sets. No LOB noted.    Other:          Therapeutic Exercise:   Exercise Sets Reps Comments   3# dowel 1 30 Shoulder press, chest press, straight arm raises, bicep curls, forward rows, backwards rows   Red flexbar 1 20 Wrist flex/ext, forearm pro/sup                    Assessment: Patient tolerated session well. Pt had positive response to today's treatment. Pt continues to make good progress towards goals. Pt would continue to benefit from skilled OT services to improve pt's safety and independence with daily occupations, decrease caregiver burden, and reduce pt's risk  of falls.     GOALS:   Multidisciplinary Problems       Occupational Therapy Goals          Problem: Occupational Therapy    Goal Priority Disciplines Outcome Interventions   Occupational Therapy Goal     OT, PT/OT Progressing    Description: Goals to be met by: 6/7/24     Patient will increase functional independence with ADLs by performing:    LE Dressing with Contact Guard Assistance.  Grooming while standing at sink with Modified [place].  Toileting from toilet with Contact Guard Assistance for hygiene and clothing management.   Bathing from  shower chair/bench with Contact Guard Assistance.  Toilet transfer to toilet with Stand-by Assistance.                         Recommendations:     Discharge Equipment Recommendations:  none     Plan:     Patient to be seen 6 x/week to address the above listed problems via self-care/home management, therapeutic activities, therapeutic exercises, wheelchair management/training  Plan of Care Expires: 06/07/24  Plan of Care Reviewed with: patient  Revisions made to plan of care: Yes      Skilled OT Minutes Provided: 30  Communication with Treatment Team:     Equipment recommendations:       At end of treatment, patient remained:   Up in chair     Up in wheelchair in room    In bed    With alarm activated    Bed rails up    Call bell in reach     Family/friends present    Restraints secured properly    In bathroom with CNA/RN notified   x In gym with PT/PTA/tech    Nurse aware    Other:

## 2024-06-06 NOTE — PLAN OF CARE
Talked with daughter twice today she stated she still hs not gotten Bank statement for placement Informed daughter insurance requesting DC Daughter stated will take him home till they can  set up placement

## 2024-06-06 NOTE — PLAN OF CARE
Problem: Adult Inpatient Plan of Care  Goal: Plan of Care Review  Outcome: Progressing  Goal: Patient-Specific Goal (Individualized)  Outcome: Progressing  Flowsheets (Taken 6/6/2024 1131)  Individualized Care Needs: safety with mobility, glucose montoring  Anxieties, Fears or Concerns: none expressed  Goal: Absence of Hospital-Acquired Illness or Injury  Outcome: Progressing  Intervention: Identify and Manage Fall Risk  Flowsheets (Taken 6/6/2024 1131)  Safety Promotion/Fall Prevention:   assistive device/personal item within reach   bed alarm set   nonskid shoes/socks when out of bed   side rails raised x 2  Goal: Optimal Comfort and Wellbeing  Outcome: Progressing  Goal: Readiness for Transition of Care  Outcome: Progressing     Problem: Diabetes Comorbidity  Goal: Blood Glucose Level Within Targeted Range  Outcome: Progressing  Intervention: Monitor and Manage Glycemia  Flowsheets (Taken 6/6/2024 1131)  Glycemic Management: blood glucose monitored     Problem: Fall Injury Risk  Goal: Absence of Fall and Fall-Related Injury  Outcome: Progressing  Intervention: Identify and Manage Contributors  Flowsheets (Taken 6/6/2024 1131)  Self-Care Promotion:   independence encouraged   adaptive equipment use encouraged  Medication Review/Management: medications reviewed     Problem: Wound  Goal: Optimal Coping  Outcome: Progressing  Goal: Optimal Functional Ability  Outcome: Progressing  Goal: Optimal Pain Control and Function  Outcome: Progressing  Goal: Skin Health and Integrity  Outcome: Progressing  Goal: Optimal Wound Healing  Outcome: Progressing     Problem: Skin Injury Risk Increased  Goal: Skin Health and Integrity  Outcome: Progressing  Intervention: Optimize Skin Protection  Flowsheets (Taken 6/6/2024 1131)  Pressure Reduction Techniques: frequent weight shift encouraged  Activity Management: Rolling - L1  Head of Bed (HOB) Positioning: HOB at 30 degrees

## 2024-06-06 NOTE — PLAN OF CARE
Problem: Occupational Therapy  Goal: Occupational Therapy Goal  Description: Goals to be met by: 6/7/24     Patient will increase functional independence with ADLs by performing:    LE Dressing with Contact Guard Assistance.  Grooming while standing at sink with Modified [place].  Toileting from toilet with Contact Guard Assistance for hygiene and clothing management.   Bathing from  shower chair/bench with Contact Guard Assistance.  Toilet transfer to toilet with Stand-by Assistance.    Outcome: Progressing

## 2024-06-06 NOTE — PT/OT/SLP PROGRESS
Physical Therapy Treatment Note           Name: Josep Angulo    : 1950 (73 y.o.)  MRN: [id number]           TREATMENT SUMMARY AND RECOMMENDATIONS:    PT Received On: 24  PT Start Time: 1430     PT Stop Time: 1445  PT Total Time (min): 15 min     Subjective Assessment:   No complaints  Lethargic   x Awake, alert, cooperative  Uncooperative    Agitated  c/o pain    Appropriate  c/o fatigue    Confused x Treated at bedside     Emotionally labile  Treated in gym/dept.    Impulsive  Other:    Flat affect       Therapy Precautions:    Cognitive deficits  Spinal precautions    Collar - hard  Sternal precautions    Collar - soft   TLSO   x Fall risk  LSO    Hip precautions - posterior  Knee immobilizer    Hip precautions - anterior  WBAT    Impaired communication  Partial weightbearing    Oxygen  TTWB    PEG tube  NWB    Visual deficits x Other: L BKA (poor fitting prosthesis in room)    Hearing deficits          Treatment Objectives:     Mobility Training:   Assist level Comments    Bed mobility     Transfer SBA WC>Recliner and Toilet>WC without AD   Gait CGA Amb on firm surface with RW  4 x 50 ft 3 point gait patterns due to L BKA   Sit to stand transitions SBA X 5 reps with B UE use   Sitting balance     Standing balance      Wheelchair mobility     Car transfer     Other:          Therapeutic Exercise:   Exercise Sets Reps Comments                               Additional Comments:  Pt without any issues. Found on toilet upon entering room where pt was found to have BM. [place]e ambulation completed with good tolerance. Sitting rest breaks needed due to B UE fatigue.     Assessment: Patient tolerated session well. Awaiting NH placement     PT Plan: continue. Pt to be seen 1-2 times per day, 5-6 days per week.   Revisions made to plan of care: reviewed- progressing     GOALS:   Multidisciplinary Problems       Physical Therapy Goals          Problem: Physical Therapy    Goal Priority Disciplines  Outcome Goal Variances Interventions   Physical Therapy Goal     PT, PT/OT Progressing     Description: Description: Goals to be met by: Discharge       Patient will increase functional independence with mobility by performin. Sit to stand transfer with Supervision  2. Bed to chair transfer with Supervision using Rolling Walker  3. Wheelchair propulsion x 300 feet with Modified [place] using bilateral uppper extremities.   4. Gait x 50 feet at SPV levels using RW with or without prosthesis (currently poor fitting and needs  follow op)                         Skilled PT Minutes Provided: 15   Communication with Treatment Team:     Equipment recommendations:       At end of treatment, patient remained:  x Up in chair     Up in wheelchair in room    In bed   x With alarm activated    Bed rails up   x Call bell in reach     Family/friends present    Restraints secured properly    In bathroom with CNA/RN notified    Nurse aware    In gym with therapist/tech    Other:        no

## 2024-06-06 NOTE — PLAN OF CARE
Problem: Adult Inpatient Plan of Care  Goal: Plan of Care Review  Outcome: Progressing  Flowsheets (Taken 6/5/2024 1944)  Plan of Care Reviewed With: patient  Goal: Patient-Specific Goal (Individualized)  Outcome: Progressing  Flowsheets (Taken 6/5/2024 1944)  Individualized Care Needs: safety and glucose monitoring ongoing  Anxieties, Fears or Concerns: none expressed     Problem: Diabetes Comorbidity  Goal: Blood Glucose Level Within Targeted Range  Outcome: Progressing  Intervention: Monitor and Manage Glycemia  Flowsheets (Taken 6/5/2024 1944)  Glycemic Management: blood glucose monitored     Problem: Fall Injury Risk  Goal: Absence of Fall and Fall-Related Injury  Outcome: Progressing  Intervention: Identify and Manage Contributors  Flowsheets (Taken 6/5/2024 1944)  Medication Review/Management: medications reviewed

## 2024-06-06 NOTE — PROGRESS NOTES
Name: Josep Angulo    : 1950 (73 y.o.)  MRN: [id number]      [facility] Recommendations:    Wheelchair:  Patient would benefit from a 18 inch manual wheelchair with elevating leg rests and removable arm rests d/t patient having a mobility limitation that significantly impairs his/her ability to participate in one or more mobility related activities of daily living such as toileting, feeding, dressing, grooming, and bathing. The patient's mobility limitation can not be sufficiently resolved by the use of a cane or walker. The use of a manual wheelchair will significantly improve the patient's ability to participate in ADLs/mobility tasks and the patient will use it on a regular basis in the home environment. The patient is able to self propel wheelchair.     Patient weight: 162 lbs  Hip to hip measurements: 20 inches

## 2024-06-06 NOTE — PT/OT/SLP PROGRESS
Physical Therapy Treatment Note           Name: Josep Angulo    : 1950 (73 y.o.)  MRN: [id number]           TREATMENT SUMMARY AND RECOMMENDATIONS:    PT Received On: 24  PT Start Time: 930     PT Stop Time: 955  PT Total Time (min): 25 min     Subjective Assessment:  x No complaints  Lethargic    Awake, alert, cooperative  Uncooperative    Agitated  c/o pain    Appropriate  c/o fatigue    Confused  Treated at bedside     Emotionally labile  Treated in gym/dept.    Impulsive  Other:    Flat affect       Therapy Precautions:    Cognitive deficits  Spinal precautions    Collar - hard  Sternal precautions    Collar - soft   TLSO    Fall risk  LSO    Hip precautions - posterior  Knee immobilizer    Hip precautions - anterior  WBAT    Impaired communication  Partial weightbearing    Oxygen  TTWB    PEG tube  NWB    Visual deficits  Other:    Hearing deficits          Treatment Objectives:     Mobility Training:   Assist level Comments    Bed mobility     Transfer CGA WC-commode with bathroom handrail   Gait CGA Amb on firm surface with RW 30 ft    Sit to stand transitions CGA Sit-stand 10 reps with B UE use   Sitting balance     Standing balance      Wheelchair mobility     Car transfer     Other:          Therapeutic Exercise:   Exercise Sets Reps Comments   B LE exer sitting  1 20 2# ankle  wts - in all planes to promote muscle strength and ROM    L LE exer standing  2 20 B UE supported- abd/add, knee lifts                    Additional Comments:  No issues noted    Assessment: Patient tolerated session well.    PT Plan: continue- awaiting NH vs Home placement   Revisions made to plan of care: No    GOALS:   Multidisciplinary Problems       Physical Therapy Goals          Problem: Physical Therapy    Goal Priority Disciplines Outcome Goal Variances Interventions   Physical Therapy Goal     PT, PT/OT Progressing     Description: Description: Goals to be met by: Discharge       Patient will  increase functional independence with mobility by performin. Sit to stand transfer with Supervision  2. Bed to chair transfer with Supervision using Rolling Walker  3. Wheelchair propulsion x 300 feet with Modified [place] using bilateral uppper extremities.   4. Gait x 50 feet at SPV levels using RW with or without prosthesis (currently poor fitting and needs  follow op)                         Skilled PT Minutes Provided: 25   Communication with Treatment Team:     Equipment recommendations:       At end of treatment, patient remained:   Up in chair     Up in wheelchair in room    In bed    With alarm activated    Bed rails up   x Call bell in reach     Family/friends present    Restraints secured properly   x In bathroom with CNA/RN notified    Nurse aware    In gym with therapist/tech    Other:

## 2024-06-06 NOTE — PROGRESS NOTES
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 Daughter reports patient was unable to follow commands and falling asleep easily. Upon arrival to the ER, vitals revealed elevated temperature reading and mildly elevated BP. Labs were significant for leukocytosis, hypoglycemia with a glucose of 28, elevated CRP. Patient was given a D10 infusion with improvement of glucose to 126. CXR revealed prominent interstitial markings with no focal opacification, no acute cardiopulmonary process appreciated. CT head revealed no acute intracranial abnormality identified with chronic microvascular ischemic disease. Daughter at bedside brought patient's home medication bottles. Patient had 4 bottles of Metformin 1000mg BID which daughter reports patient only takes once daily and 3 bottles of Glimepiride 2mg BID. Daughter reports she gets help from a nurse who comes to her home to organize the patient's medication therefore she does not believe he could have taken too much medication. On exam, CBG registered as <20. He did receive a one time dose of Octreotide 50mcg and D5NS at 100cc/hr. Patient is being admitted to hospital medicine service.      His glucose improved after the dose of octreotide.  D5 NS was able to be discontinued.  He is now on metformin a 1000 mg b.i.d. and tolerating well.  Blood pressure was noted to be elevated and home lisinopril has been increased to 20 mg daily starting today.  Patient is being transitioned to our swing unit for continued therapy while awaiting placement at nursing home.    Today:  No reported complaints today. TATI  awaiting financials in order for patient to be discharged to [facility].    OBJECTIVE/PHYSICAL EXAM     VITAL SIGNS (MOST RECENT):  Temp: 97.7 °F (36.5 °C) (06/06/24 0757)  Pulse: 94 (06/06/24 0757)  Resp: 18 (06/06/24 0755)  BP: 126/76 (06/06/24 0757)  SpO2: 100 % (06/06/24 0757) VITAL SIGNS (24 HOUR RANGE):  Temp:  [97.7 °F (36.5 °C)-97.8 °F (36.6 °C)] 97.7 °F (36.5 °C)  Pulse:  [78-94] 94  Resp:  [18] 18  SpO2:  [98 %-100 %] 100 %  BP: (119-126)/(70-76) 126/76     GENERAL: In no acute distress, afebrile  HEENT: Atraumatic, normocephalic, moist mucous membranes   CHEST: Clear to auscultation bilaterally  HEART: S1, S2, no appreciable murmur  ABDOMEN: Soft, nontender, BS +  MSK: Warm, no lower extremity edema, left BKA   NEUROLOGIC: Alert and oriented, moving all extremities with good strength   INTEGUMENTARY: See media for right great toe   PSYCHIATRY: Appropriate mood and affect     ASSESSMENT/PLAN     Sulfonylurea induced hypoglycemia   Uncontrolled DM2   Hold home Glimepiride   Received D10 in ER, Octreotide 50mcg  A1c 6%   Continue Metformin to 1000mg BID and Jardiance 25mg QD      Acute metabolic encephalopathy - resolved   2/2 hypoglycemia and receiving sedating medications in the ER   Limit sedating medications   CT head revealed no acute intracranial abnormality identified with chronic microvascular ischemic disease     Leukocytosis - resolved   UA without UTI   CXR without acute findings     Covid, flu, RSV negative      HTN  Change Lisinopril 20mg to Norvasc 10mg 06/04/2024 due to hyperkalemia      BPH  Continue home Flomax     Iron deficiency   Continue home iron supplementation     Bipolar disorder, insomnia, dementia  Continue home Mirtazapine   Daughter requesting NH placement at [facility]  consulted      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   PT/OT - needs appointment with [facility] due to poorly fitted prosthetic     Hyperkalemia   Required 1 dose of Kayexalate     DVT prophylaxis:  Lovenox  "    Anticipated discharge and disposition: Awaiting placement at [facility]  __________________________________________________________________________    LABS/MICRO/MEDS/DIAGNOSTICS     LABS  Recent Labs     06/04/24  0441 06/04/24  1212   *  --    K 5.2* 4.9   CO2 26  --    BUN 21.6  --    CREATININE 1.12  --    GLUCOSE 110  --    CALCIUM 9.3  --      Recent Labs     06/04/24  0441   WBC 6.58   RBC 4.42*   HCT 37.4*   MCV 84.6        MICROBIOLOGY  Microbiology Results (last 7 days)       ** No results found for the last 168 hours. **             MEDICATIONS   amLODIPine  10 mg Oral Daily    aspirin  81 mg Oral Daily    citalopram  10 mg Oral Daily    empagliflozin  25 mg Oral Daily    enoxparin  40 mg Subcutaneous Daily    ferrous sulfate  1 tablet Oral Daily    metFORMIN  1,000 mg Oral BID    mirtazapine  15 mg Oral QHS    tamsulosin  0.4 mg Oral Daily         INFUSIONS       DIAGNOSTIC TESTS  No orders to display      No results found for: "EF"     NUTRITION STATUS  Patient meets ASPEN criteria for   malnutrition of   per RD assessment as evidenced by:                       A minimum of two characteristics is recommended for diagnosis of either severe or non-severe malnutrition.     Case related differential diagnoses have been reviewed; assessment and plan has been documented. I have personally reviewed the labs and test results that are currently available; I have reviewed the patients medication list. I have reviewed the consulting providers recommendations. I have reviewed or attempted to review medical records based upon their availability.  All of the patient's and/or family's questions have been addressed and answered to the best of my ability.  I will continue to monitor closely and make adjustments to medical management as needed.  This document was created using M*Modal Fluency Direct.  Transcription errors may have been made.  Please contact me if any questions may rise regarding " documentation to clarify transcription.      ALICE Hilton   Internal Medicine  Department of Hospital Medicine [facility] - [facility] Surgical Unit

## 2024-06-07 VITALS
HEART RATE: 83 BPM | HEIGHT: 68 IN | TEMPERATURE: 98 F | WEIGHT: 162.5 LBS | BODY MASS INDEX: 24.63 KG/M2 | DIASTOLIC BLOOD PRESSURE: 74 MMHG | RESPIRATION RATE: 18 BRPM | SYSTOLIC BLOOD PRESSURE: 133 MMHG | OXYGEN SATURATION: 98 %

## 2024-06-07 LAB
ANION GAP SERPL CALC-SCNC: 10 MEQ/L
BUN SERPL-MCNC: 22.3 MG/DL (ref 8.4–25.7)
CALCIUM SERPL-MCNC: 9.3 MG/DL (ref 8.8–10)
CHLORIDE SERPL-SCNC: 103 MMOL/L (ref 98–107)
CO2 SERPL-SCNC: 24 MMOL/L (ref 23–31)
CREAT SERPL-MCNC: 1.11 MG/DL (ref 0.73–1.18)
CREAT/UREA NIT SERPL: 20
GFR SERPLBLD CREATININE-BSD FMLA CKD-EPI: >60 ML/MIN/1.73/M2
GLUCOSE SERPL-MCNC: 85 MG/DL (ref 82–115)
POCT GLUCOSE: 122 MG/DL (ref 70–110)
POCT GLUCOSE: 123 MG/DL (ref 70–110)
POCT GLUCOSE: 158 MG/DL (ref 70–110)
POCT GLUCOSE: 192 MG/DL (ref 70–110)
POCT GLUCOSE: 99 MG/DL (ref 70–110)
POTASSIUM SERPL-SCNC: 5 MMOL/L (ref 3.5–5.1)
SODIUM SERPL-SCNC: 137 MMOL/L (ref 136–145)

## 2024-06-07 PROCEDURE — 80048 BASIC METABOLIC PNL TOTAL CA: CPT | Performed by: PHYSICIAN ASSISTANT

## 2024-06-07 PROCEDURE — 97110 THERAPEUTIC EXERCISES: CPT

## 2024-06-07 PROCEDURE — 25000003 PHARM REV CODE 250: Performed by: PHYSICIAN ASSISTANT

## 2024-06-07 PROCEDURE — 63600175 PHARM REV CODE 636 W HCPCS: Performed by: PHYSICIAN ASSISTANT

## 2024-06-07 PROCEDURE — 36415 COLL VENOUS BLD VENIPUNCTURE: CPT | Performed by: PHYSICIAN ASSISTANT

## 2024-06-07 PROCEDURE — 97116 GAIT TRAINING THERAPY: CPT | Mod: CQ

## 2024-06-07 PROCEDURE — 97530 THERAPEUTIC ACTIVITIES: CPT

## 2024-06-07 PROCEDURE — 97110 THERAPEUTIC EXERCISES: CPT | Mod: CQ

## 2024-06-07 PROCEDURE — 99900035 HC TECH TIME PER 15 MIN (STAT)

## 2024-06-07 PROCEDURE — 25000242 PHARM REV CODE 250 ALT 637 W/ HCPCS: Performed by: PHYSICIAN ASSISTANT

## 2024-06-07 RX ORDER — INSULIN PUMP SYRINGE, 3 ML
EACH MISCELLANEOUS
Qty: 1 EACH | Refills: 0 | Status: SHIPPED | OUTPATIENT
Start: 2024-06-07 | End: 2025-06-07

## 2024-06-07 RX ORDER — LANCETS
EACH MISCELLANEOUS
Qty: 100 EACH | Refills: 0 | Status: SHIPPED | OUTPATIENT
Start: 2024-06-07

## 2024-06-07 RX ORDER — AMLODIPINE BESYLATE 10 MG/1
10 TABLET ORAL DAILY
Qty: 30 TABLET | Refills: 0 | Status: SHIPPED | OUTPATIENT
Start: 2024-06-08 | End: 2024-07-08

## 2024-06-07 RX ORDER — CITALOPRAM 10 MG/1
10 TABLET ORAL DAILY
Qty: 30 TABLET | Refills: 0 | Status: SHIPPED | OUTPATIENT
Start: 2024-06-08 | End: 2024-07-08

## 2024-06-07 RX ADMIN — METFORMIN HYDROCHLORIDE 1000 MG: 500 TABLET ORAL at 08:06

## 2024-06-07 RX ADMIN — CITALOPRAM HYDROBROMIDE 10 MG: 10 TABLET ORAL at 08:06

## 2024-06-07 RX ADMIN — ASPIRIN 81 MG: 81 TABLET, COATED ORAL at 08:06

## 2024-06-07 RX ADMIN — AMLODIPINE BESYLATE 10 MG: 5 TABLET ORAL at 08:06

## 2024-06-07 RX ADMIN — FERROUS SULFATE TAB 325 MG (65 MG ELEMENTAL FE) 1 EACH: 325 (65 FE) TAB at 08:06

## 2024-06-07 RX ADMIN — TAMSULOSIN HYDROCHLORIDE 0.4 MG: 0.4 CAPSULE ORAL at 08:06

## 2024-06-07 RX ADMIN — ENOXAPARIN SODIUM 40 MG: 40 INJECTION SUBCUTANEOUS at 04:06

## 2024-06-07 RX ADMIN — EMPAGLIFLOZIN 25 MG: 25 TABLET, FILM COATED ORAL at 08:06

## 2024-06-07 NOTE — PROGRESS NOTES
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 Daughter reports patient was unable to follow commands and falling asleep easily. Upon arrival to the ER, vitals revealed elevated temperature reading and mildly elevated BP. Labs were significant for leukocytosis, hypoglycemia with a glucose of 28, elevated CRP. Patient was given a D10 infusion with improvement of glucose to 126. CXR revealed prominent interstitial markings with no focal opacification, no acute cardiopulmonary process appreciated. CT head revealed no acute intracranial abnormality identified with chronic microvascular ischemic disease. Daughter at bedside brought patient's home medication bottles. Patient had 4 bottles of Metformin 1000mg BID which daughter reports patient only takes once daily and 3 bottles of Glimepiride 2mg BID. Daughter reports she gets help from a nurse who comes to her home to organize the patient's medication therefore she does not believe he could have taken too much medication. On exam, CBG registered as <20. He did receive a one time dose of Octreotide 50mcg and D5NS at 100cc/hr. Patient is being admitted to hospital medicine service.      His glucose improved after the dose of octreotide.  D5 NS was able to be discontinued.  He is now on metformin a 1000 mg b.i.d. and tolerating well.  Blood pressure was noted to be elevated and home lisinopril has been increased to 20 mg daily starting today.  Patient is being transitioned to our swing unit for continued therapy while awaiting placement at nursing home.    Today:  No reported complaints today.      OBJECTIVE/PHYSICAL EXAM     VITAL SIGNS (MOST RECENT):  Temp: 97.5 °F (36.4 °C) (06/07/24 0659)  Pulse: 83 (06/07/24 0659)  Resp: 18 (06/06/24 2114)  BP: 133/74 (06/07/24 0835)  SpO2: 99 % (06/07/24 0659) VITAL SIGNS (24 HOUR RANGE):  Temp:  [97.5 °F (36.4 °C)-97.8 °F (36.6 °C)] 97.5 °F (36.4 °C)  Pulse:  [80-83] 83  Resp:  [18] 18  SpO2:  [96 %-100 %] 99 %  BP: (122-133)/(72-74) 133/74     GENERAL: In no acute distress, afebrile  HEENT: Atraumatic, normocephalic, moist mucous membranes   CHEST: Clear to auscultation bilaterally  HEART: S1, S2, no appreciable murmur  ABDOMEN: Soft, nontender, BS +  MSK: Warm, no lower extremity edema, left BKA   NEUROLOGIC: Alert and oriented, moving all extremities with good strength   INTEGUMENTARY: See media for right great toe   PSYCHIATRY: Appropriate mood and affect     ASSESSMENT/PLAN     Sulfonylurea induced hypoglycemia   Uncontrolled DM2   Hold home Glimepiride   Received D10 in ER, Octreotide 50mcg  A1c 6%   Continue Metformin to 1000mg BID and Jardiance 25mg QD      Acute metabolic encephalopathy - resolved   2/2 hypoglycemia and receiving sedating medications in the ER   Limit sedating medications   CT head revealed no acute intracranial abnormality identified with chronic microvascular ischemic disease     Leukocytosis - resolved   UA without UTI   CXR without acute findings     Covid, flu, RSV negative      HTN  Change Lisinopril 20mg to Norvasc 10mg 06/04/2024 due to intermittent hyperkalemia      BPH  Continue home Flomax     Iron deficiency   Continue home iron supplementation     Bipolar disorder, insomnia, dementia  Continue home Mirtazapine   Daughter requesting NH placement at [facility] consulted      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   PT/OT - needs appointment with  due to poorly fitted prosthetic   Needs WC upon discharge until      Hyperkalemia   Required 1 dose of Kayexalate     DVT prophylaxis:  Lovenox     Anticipated discharge  "and disposition: Awaiting placement at [facility] vs discharge home pending insurance   __________________________________________________________________________    LABS/MICRO/MEDS/DIAGNOSTICS     LABS  Recent Labs     06/07/24  0545      K 5.0   CO2 24   BUN 22.3   CREATININE 1.11   GLUCOSE 85   CALCIUM 9.3     No results for input(s): "WBC", "RBC", "HCT", "MCV", "PLT" in the last 72 hours.    Invalid input(s): "HG"    MICROBIOLOGY  Microbiology Results (last 7 days)       ** No results found for the last 168 hours. **             MEDICATIONS   amLODIPine  10 mg Oral Daily    aspirin  81 mg Oral Daily    citalopram  10 mg Oral Daily    empagliflozin  25 mg Oral Daily    enoxparin  40 mg Subcutaneous Daily    ferrous sulfate  1 tablet Oral Daily    metFORMIN  1,000 mg Oral BID    mirtazapine  15 mg Oral QHS    tamsulosin  0.4 mg Oral Daily         INFUSIONS       DIAGNOSTIC TESTS  No orders to display      No results found for: "EF"     NUTRITION STATUS  Patient meets ASPEN criteria for   malnutrition of   per RD assessment as evidenced by:                       A minimum of two characteristics is recommended for diagnosis of either severe or non-severe malnutrition.     Case related differential diagnoses have been reviewed; assessment and plan has been documented. I have personally reviewed the labs and test results that are currently available; I have reviewed the patients medication list. I have reviewed the consulting providers recommendations. I have reviewed or attempted to review medical records based upon their availability.  All of the patient's and/or family's questions have been addressed and answered to the best of my ability.  I will continue to monitor closely and make adjustments to medical management as needed.  This document was created using M*Modal Fluency Direct.  Transcription errors may have been made.  Please contact me if any questions may rise regarding documentation to clarify " transcription.      ALICE Hilton   Internal Medicine  Department of Hospital Medicine [facility] - [facility]

## 2024-06-07 NOTE — PLAN OF CARE
Problem: Adult Inpatient Plan of Care  Goal: Plan of Care Review  Outcome: Progressing  Flowsheets (Taken 6/7/2024 1035)  Plan of Care Reviewed With: patient  Goal: Patient-Specific Goal (Individualized)  Outcome: Progressing  Flowsheets (Taken 6/7/2024 1035)  Individualized Care Needs: safety with mobility, glucose monitoring  Anxieties, Fears or Concerns: none expressed at this time  Goal: Absence of Hospital-Acquired Illness or Injury  Outcome: Progressing  Intervention: Identify and Manage Fall Risk  Flowsheets (Taken 6/7/2024 1035)  Safety Promotion/Fall Prevention:   assistive device/personal item within reach   chair alarm set   in recliner, wheels locked   nonskid shoes/socks when out of bed  Intervention: Prevent Skin Injury  Flowsheets (Taken 6/7/2024 1035)  Body Position: position changed independently  Skin Protection: incontinence pads utilized  Device Skin Pressure Protection:   absorbent pad utilized/changed   tubing/devices free from skin contact  Intervention: Prevent and Manage VTE (Venous Thromboembolism) Risk  Flowsheets (Taken 6/7/2024 1035)  VTE Prevention/Management:   ambulation promoted   bleeding precations maintained   bleeding risk assessed   fluids promoted  Intervention: Prevent Infection  Flowsheets (Taken 6/7/2024 1035)  Infection Prevention:   cohorting utilized   environmental surveillance performed   equipment surfaces disinfected   hand hygiene promoted   single patient room provided   rest/sleep promoted   personal protective equipment utilized  Goal: Optimal Comfort and Wellbeing  Outcome: Progressing  Intervention: Monitor Pain and Promote Comfort  Flowsheets (Taken 6/7/2024 1035)  Pain Management Interventions:   care clustered   position adjusted   relaxation techniques promoted   quiet environment facilitated   pillow support provided  Intervention: Provide Person-Centered Care  Flowsheets (Taken 6/7/2024 1035)  Trust Relationship/Rapport:   care explained   questions  encouraged   choices provided   reassurance provided   emotional support provided   thoughts/feelings acknowledged   empathic listening provided   questions answered  Goal: Readiness for Transition of Care  Outcome: Progressing  Intervention: Mutually Develop Transition Plan  Flowsheets (Taken 6/7/2024 1035)  Communicated CIELO with patient/caregiver: Date not available/Unable to determine     Problem: Diabetes Comorbidity  Goal: Blood Glucose Level Within Targeted Range  Outcome: Progressing  Intervention: Monitor and Manage Glycemia  Flowsheets (Taken 6/7/2024 1035)  Glycemic Management: blood glucose monitored     Problem: Fall Injury Risk  Goal: Absence of Fall and Fall-Related Injury  Outcome: Progressing  Intervention: Identify and Manage Contributors  Flowsheets (Taken 6/7/2024 1035)  Self-Care Promotion:   independence encouraged   BADL personal objects within reach   BADL personal routines maintained   adaptive equipment use encouraged   meal set-up provided  Medication Review/Management:   medications reviewed   high-risk medications identified  Intervention: Promote Injury-Free Environment  Flowsheets (Taken 6/7/2024 1035)  Safety Promotion/Fall Prevention:   assistive device/personal item within reach   chair alarm set   in recliner, wheels locked   nonskid shoes/socks when out of bed     Problem: Wound  Goal: Optimal Coping  Outcome: Progressing  Intervention: Support Patient and Family Response  Flowsheets (Taken 6/7/2024 1035)  Supportive Measures:   active listening utilized   positive reinforcement provided  Family/Support System Care: self-care encouraged  Goal: Optimal Functional Ability  Outcome: Progressing  Intervention: Optimize Functional Ability  Flowsheets (Taken 6/7/2024 1035)  Assistive Device Utilized:   gait belt   walker  Activity Management: Up in chair - L3  Activity Assistance Provided: assistance, 1 person  Goal: Optimal Pain Control and Function  Outcome: Progressing  Intervention:  Prevent or Manage Pain  Flowsheets (Taken 6/7/2024 1035)  Sleep/Rest Enhancement:   awakenings minimized   consistent schedule promoted   regular sleep/rest pattern promoted  Pain Management Interventions:   care clustered   position adjusted   relaxation techniques promoted   quiet environment facilitated   pillow support provided  Goal: Skin Health and Integrity  Outcome: Progressing  Intervention: Optimize Skin Protection  Flowsheets (Taken 6/7/2024 1035)  Pressure Reduction Techniques: frequent weight shift encouraged  Pressure Reduction Devices: positioning supports utilized  Skin Protection: incontinence pads utilized  Activity Management: Up in chair - L3  Head of Bed (HOB) Positioning: HOB at 30-45 degrees  Goal: Optimal Wound Healing  Outcome: Progressing  Intervention: Promote Wound Healing  Flowsheets (Taken 6/7/2024 1035)  Sleep/Rest Enhancement:   awakenings minimized   consistent schedule promoted   regular sleep/rest pattern promoted     Problem: Skin Injury Risk Increased  Goal: Skin Health and Integrity  Outcome: Progressing  Intervention: Optimize Skin Protection  Flowsheets (Taken 6/7/2024 1035)  Pressure Reduction Techniques: frequent weight shift encouraged  Pressure Reduction Devices: positioning supports utilized  Skin Protection: incontinence pads utilized  Activity Management: Up in chair - L3  Head of Bed (HOB) Positioning: HOB at 30-45 degrees  Intervention: Promote and Optimize Oral Intake  Flowsheets (Taken 6/7/2024 1035)  Oral Nutrition Promotion:   calorie-dense foods provided   calorie-dense liquids provided

## 2024-06-07 NOTE — DISCHARGE SUMMARY
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 Daughter reports patient was unable to follow commands and falling asleep easily. Upon arrival to the ER, vitals revealed elevated temperature reading and mildly elevated BP. Labs were significant for leukocytosis, hypoglycemia with a glucose of 28, elevated CRP. Patient was given a D10 infusion with improvement of glucose to 126. CXR revealed prominent interstitial markings with no focal opacification, no acute cardiopulmonary process appreciated. CT head revealed no acute intracranial abnormality identified with chronic microvascular ischemic disease. Daughter at bedside brought patient's home medication bottles. Patient had 4 bottles of Metformin 1000mg BID which daughter reports patient only takes once daily and 3 bottles of Glimepiride 2mg BID. Daughter reports she gets help from a nurse who comes to her home to organize the patient's medication therefore she does not believe he could have taken too much medication. On exam, CBG registered as <20. He did receive a one time dose of Octreotide 50mcg and D5NS at 100cc/hr. Patient is being admitted to hospital medicine service.      His glucose improved after the dose of octreotide.  D5 NS was able to be discontinued.  He is now on metformin a 1000 mg b.i.d. and jardiance, tolerating without difficulty. He is on amlodipine and his BP is well controlled. At this time pt will discharge to home and his  family will continue to pursue placement in NH outpatient.     PHYSICAL EXAM     Most Recent Vital Signs:  Temp: 97.5 °F (36.4 °C) (06/07/24 0701)  Pulse: 83 (06/07/24 0701)  Resp: 18 (06/07/24 0701)  BP: 133/74 (06/07/24 0835)  SpO2: 98 % (06/07/24 1200)   GENERAL: In no acute distress, afebrile  HEENT: Atraumatic, normocephalic, moist mucous membranes   CHEST: Clear to auscultation bilaterally  HEART: S1, S2, no appreciable murmur  ABDOMEN: Soft, nontender, BS +  MSK: Warm, no lower extremity edema, left BKA   NEUROLOGIC: Alert and oriented, moving all extremities with good strength   INTEGUMENTARY: See media for right great toe   PSYCHIATRY: Appropriate mood and affect     DISCHARGE DIAGNOSIS   Sulfonylurea induced hypoglycemia   Uncontrolled DM2   Hold home Glimepiride   Received D10 in ER, Octreotide 50mcg  A1c 6%   Continue Metformin to 1000mg BID and Jardiance 25mg QD      Acute metabolic encephalopathy - resolved   2/2 hypoglycemia and receiving sedating medications in the ER   Limit sedating medications   CT head revealed no acute intracranial abnormality identified with chronic microvascular ischemic disease     Leukocytosis - resolved   UA without UTI   CXR without acute findings     Covid, flu, RSV negative      HTN  Change Lisinopril 20mg to Norvasc 10mg 06/04/2024 due to intermittent hyperkalemia      BPH  Continue home Flomax     Iron deficiency   Continue home iron supplementation     Bipolar disorder, insomnia, dementia  Continue home Mirtazapine   Daughter requesting NH placement at [facility] Ann[facility] Conklin CM consulted      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   PT/OT - needs appointment with [facility] due to poorly fitted prosthetic   Needs WC upon discharge until       Hyperkalemia   Required 1 dose of Kayexalate      Patient's screen for food insecurity, housing instability, transportation needs, utility difficulties, and interpersonal safety. Social work consulted for placement  Social History      Socioeconomic History    Marital status:    Tobacco Use    Smoking status: Never    Smokeless tobacco: Never     Social Determinants of Health     Financial Resource Strain: Low Risk  (5/22/2024)    Overall Financial Resource Strain (CARDIA)     Difficulty of Paying Living Expenses: Not hard at all   Food Insecurity: No Food Insecurity (5/22/2024)    Hunger Vital Sign     Worried About Running Out of Food in the Last Year: Never true     Ran Out of Food in the Last Year: Never true   Transportation Needs: No Transportation Needs (5/22/2024)    TRANSPORTATION NEEDS     Transportation : No   Physical Activity: Inactive (5/22/2024)    Exercise Vital Sign     Days of Exercise per Week: 0 days     Minutes of Exercise per Session: 0 min   Stress: Stress Concern Present (5/22/2024)    [employer] [place] [employer] - Occupational Stress Questionnaire     Feeling of Stress : To some extent   Housing Stability: Low Risk  (5/22/2024)    Housing Stability Vital Sign     Unable to Pay for Housing in the Last Year: No     Homeless in the Last Year: No      _____________________________________________________________________________      DISCHARGE MED REC     Current Discharge Medication List        START taking these medications    Details   amLODIPine (NORVASC) 10 MG tablet Take 1 tablet (10 mg total) by mouth once daily.  Qty: 30 tablet, Refills: 0    Comments: .      blood sugar diagnostic Strp To check BG AC HS times daily, to use with insurance preferred meter  Qty: 100 each, Refills: 0      blood-glucose meter kit To check BG AC HS times daily, to use with insurance preferred meter  Qty: 1 each, Refills: 0      citalopram (CELEXA) 10 MG tablet Take 1 tablet (10 mg total) by mouth once daily.  Qty: 30 tablet, Refills: 0      lancets Misc To check BG AC HS times daily, to use with insurance preferred meter  Qty: 100 each, Refills: 0           CONTINUE these medications which have NOT CHANGED    Details    aspirin (ECOTRIN) 81 MG EC tablet Take 81 mg by mouth once daily.      ferrous gluconate (FERGON) 240 (27 FE) MG tablet Take 1 tablet by mouth 3 (three) times daily with meals.      JARDIANCE 25 mg tablet Take 25 mg by mouth once daily.      metFORMIN (GLUCOPHAGE) 1000 MG tablet Take 1,000 mg by mouth 2 (two) times daily.      mirtazapine (REMERON) 15 MG tablet Take 15 mg by mouth every evening.      tamsulosin (FLOMAX) 0.4 mg Cap Take 1 capsule by mouth once daily.           STOP taking these medications       glimepiride (AMARYL) 2 MG tablet Comments:   Reason for Stopping:         lisinopriL (PRINIVIL,ZESTRIL) 5 MG tablet Comments:   Reason for Stopping:                  CONSULTS     Consults (From admission, onward)          Status Ordering Provider     Inpatient consult to Social Work/Case Management  Once        Provider:  (Not yet assigned)    Acknowledged JAZMIN GUY              FOLLOW UP      Follow-up Information       [facility]. Go in 6 day(s).    Why: SPOKE TO AVERY, APPOINTMENT SET FOR 6/13/2024 AT 10:00AM  Contact information:  [address] [facility] [address]  [phone]             Sami Rothman MD Follow up.    Specialty: Family Medicine  Contact information:  [address]  [facility] [address] [phone]               [facility], [facility] Follow up.    Contact information:  [address]  [facility] [address]  [phone]             [facility] Follow up.    Specialties: Home Health Services, Home Therapy Services, Home Living Aide Services  Contact information:  [address] [facility] [address]  [phone]                               DISCHARGE INSTRUCTIONS     Explained in detail to the patient about the discharge plan, medications, and follow-up visits. Pt understands and agrees with the treatment plan.  Discharged Condition: stable  Diet as tolerated  Activities as tolerated  Discharge  to: Home-Health Care Svc    TIME SPENT ON DISCHARGE   35 minutes        Thairy G Reyes, DO  Internal Medicine  Department of Hospital Medicine [facility]      This document was created using electronic dictation services.  Please excuse any errors that may have been made.  Contact me if any questions regarding documentation to clarify verbiage.

## 2024-06-07 NOTE — PLAN OF CARE
Patient to discharge today due to insurance giving discharge date. Daughter Nellie notified she will pick patient up at 530pm. Desirae with [facility] Notified and stated that patient can be admitted from home. Referral sent to [facility] health.

## 2024-06-07 NOTE — PROGRESS NOTES
Discharge instructions explained to patient and daughter. All questions answered at this time. Iv removed, catheter intact. Home medication returned to patient. Patient wheeled to personal vehicle with all belongings.

## 2024-06-07 NOTE — PT/OT/SLP PROGRESS
Physical Therapy Treatment Note           Name: Josep Angulo    : 1950 (73 y.o.)  MRN: [id number]           TREATMENT SUMMARY AND RECOMMENDATIONS:    PT Received On: 24  PT Start Time: 1030     PT Stop Time: 1055  PT Total Time (min): 25 min     Subjective Assessment:  x No complaints  Lethargic    Awake, alert, cooperative  Uncooperative    Agitated  c/o pain    Appropriate  c/o fatigue    Confused  Treated at bedside     Emotionally labile x Treated in gym/dept.    Impulsive  Other:    Flat affect       Therapy Precautions:    Cognitive deficits  Spinal precautions    Collar - hard  Sternal precautions    Collar - soft   TLSO    Fall risk  LSO    Hip precautions - posterior  Knee immobilizer    Hip precautions - anterior  WBAT    Impaired communication  Partial weightbearing    Oxygen  TTWB    PEG tube  NWB    Visual deficits  Other:    Hearing deficits          Treatment Objectives:     Mobility Training:   Assist level Comments    Bed mobility     Transfer SBA WC-recliner    Gait SBA Amb on firm surface with RW 40 ft x 2    Sit to stand transitions     Sitting balance     Standing balance      Wheelchair mobility [place] WC mobility with B UE use 100 ft    Car transfer     Other:          Therapeutic Exercise:   Exercise Sets Reps Comments   UBE 10 min  UBE with B LE use   B LE exer sitting  1 20 2# B ankle wts- in all planes to promote muscle strength and ROM                    Additional Comments:  No issues noted     Assessment: Patient tolerated session well.    PT Plan: continue  Revisions made to plan of care: No    GOALS:   Multidisciplinary Problems       Physical Therapy Goals          Problem: Physical Therapy    Goal Priority Disciplines Outcome Goal Variances Interventions   Physical Therapy Goal     PT, PT/OT Progressing     Description: Description: Goals to be met by: Discharge       Patient will increase functional independence with mobility by performin. Sit to stand  transfer with Supervision  2. Bed to chair transfer with Supervision using Rolling Walker  3. Wheelchair propulsion x 300 feet with Modified [place] using bilateral uppper extremities.   4. Gait x 50 feet at SPV levels using RW with or without prosthesis (currently poor fitting and needs  follow op)                         Skilled PT Minutes Provided: 25   Communication with Treatment Team:     Equipment recommendations:       At end of treatment, patient remained:  x Up in chair     Up in wheelchair in room    In bed   x With alarm activated    Bed rails up   x Call bell in reach     Family/friends present    Restraints secured properly    In bathroom with CNA/RN notified    Nurse aware    In gym with therapist/tech    Other:

## 2024-06-07 NOTE — PLAN OF CARE
Problem: Adult Inpatient Plan of Care  Goal: Plan of Care Review  Outcome: Progressing  Flowsheets (Taken 6/6/2024 1944)  Plan of Care Reviewed With: patient  Goal: Patient-Specific Goal (Individualized)  Outcome: Progressing  Flowsheets (Taken 6/6/2024 1944)  Individualized Care Needs: safety and glucose monitoring ongoing  Anxieties, Fears or Concerns: none expressed     Problem: Diabetes Comorbidity  Goal: Blood Glucose Level Within Targeted Range  Outcome: Progressing  Intervention: Monitor and Manage Glycemia  Flowsheets (Taken 6/6/2024 1944)  Glycemic Management: blood glucose monitored     Problem: Fall Injury Risk  Goal: Absence of Fall and Fall-Related Injury  Outcome: Progressing  Intervention: Identify and Manage Contributors  Flowsheets (Taken 6/6/2024 1944)  Medication Review/Management: medications reviewed

## 2024-06-07 NOTE — PT/OT/SLP PROGRESS
Occupational Therapy  Treatment    Name: Josep Angulo    : 1950 (73 y.o.)  MRN: [id number]           TREATMENT SUMMARY AND RECOMMENDATIONS:      OT Date of Treatment: 24  OT Start Time: 1020  OT Stop Time: 1050  OT Total Time (min): 30 min      Subjective Assessment:   No complaints  Lethargic   x Awake, alert, cooperative  Impulsive    Uncooperative   Flat affect    Agitated  c/o pain   x Appropriate  c/o fatigue    Confused  Treated at bedside     Emotionally labile x Treated in gym/dept.      Other:        Therapy Precautions:    Cognitive deficits  Spinal precautions    Collar - hard  Sternal precautions    Collar - soft   TLSO   x Fall risk  LSO    Hip precautions - posterior  Knee immobilizer    Hip precautions - anterior  WBAT    Impaired communication  Partial weightbearing    Oxygen  TTWB    PEG tube  NWB    Visual deficits      Hearing deficits   Other:        Treatment Objectives:     Mobility Training:    Mobility task Assist level Comments    Bed mobility     Transfer CGA/SBA Stand pivot t/f to WC    Sit to stands transitions CGA/SBA BSChair<standing using a RW and a gait belt    Functional mobility     Sitting balance     Standing balance  CGA/Min A Pt engaged in tapping a balloon back and forth with a tech in standing; tapping with his right hand first then his left to challenge his dynamic standing balance; pt requires min A to maintain his balance when letting go of his RW with his left hand; use of a RW for support with one UE   Other:        ADL Training:    ADL Assist level Comments    Feeding     Grooming/hygiene     Bathing     Upper body dressing     Lower body dressing     Toileting     Toilet transfer     Adaptive equipment training     Other:           Therapeutic Exercise:   Exercise Sets Reps Comments   BUE strengthening exercises 3 10 3# weighted dowel performing bicep curls, chest press, shoulder press, and forward/backward rows    Red flex bar 3 10 Forearm  pronation/supination; wrist flx/ext                   Additional Comments:      Assessment: Patient had a positive response to his session. Pt is planning on DC to [facility] this afternoon.     GOALS:   Multidisciplinary Problems       Occupational Therapy Goals          Problem: Occupational Therapy    Goal Priority Disciplines Outcome Interventions   Occupational Therapy Goal     OT, PT/OT Progressing    Description: Goals to be met by: 6/7/24     Patient will increase functional independence with ADLs by performing:    LE Dressing with Contact Guard Assistance.  Grooming while standing at sink with Modified [place].  Toileting from toilet with Contact Guard Assistance for hygiene and clothing management.   Bathing from  shower chair/bench with Contact Guard Assistance.  Toilet transfer to toilet with Stand-by Assistance.                         Recommendations:     Discharge Equipment Recommendations:  none     Plan:     Patient to be seen 6 x/week to address the above listed problems via self-care/home management, therapeutic activities, therapeutic exercises, wheelchair management/training  Plan of Care Expires: 06/07/24  Plan of Care Reviewed with: patient  Revisions made to plan of care: No      Skilled OT Minutes Provided: 30  Communication with Treatment Team:     Equipment recommendations:       At end of treatment, patient remained:   Up in chair     Up in wheelchair in room    In bed    With alarm activated    Bed rails up    Call bell in reach     Family/friends present    Restraints secured properly    In bathroom with CNA/RN notified   x In gym with PT/PTA/tech    Nurse aware    Other:

## 2024-06-10 NOTE — PLAN OF CARE
[facility]  Discharge Final Note    Primary Care Provider: Sami Rothman MD    Expected Discharge Date: 6/7/2024    Final Discharge Note (most recent)       Final Note - 06/10/24 0707          Final Note    Assessment Type Final Discharge Note     Anticipated Discharge Disposition Home-Health Care [facility]     What phone number can be called within the next 1-3 days to see how you are doing after discharge? [phone]     Hospital Resources/Appts/Education Provided Provided patient/caregiver with written discharge plan information        Post-Acute Status    Post-Acute Authorization Home Health     Home Health Status Set-up Complete/Auth obtained     Discharge Delays None known at this time                     Important Message from Medicare             Contact Info       [facility]    [address]  [facility] [address]   Phone: [phone]       Next Steps: Go in 6 day(s)    Instructions: SPOKE TO AVERY, APPOINTMENT SET FOR 6/13/2024 AT 10:00AM    Sami Rothman MD   Specialty: Family Medicine   Relationship: PCP - General    [address]  [place] [address]   Phone: [phone]       Next Steps: Follow up    [facility]    [facility]  [facility] [address]   Phone: [phone]       Next Steps: Follow up    [facility]   Specialty: Home Health Services, Home Therapy Services, Home Living Aide Services    [address]  [facility] [address]   Phone: [phone]       Next Steps: Follow up

## 2024-06-11 ENCOUNTER — PATIENT OUTREACH (OUTPATIENT)
Dept: ADMINISTRATIVE | Facility: CLINIC | Age: 74
End: 2024-06-11
Payer: MEDICARE

## 2024-06-11 NOTE — PROGRESS NOTES
C3 nurse spoke with Josep Angulo 's daughter for a TCC post hospital discharge follow up call. The patient has a scheduled HOSFU appointment with [facility] on 6/13/2024 AT 10:00 AM

## 2024-06-18 NOTE — PLAN OF CARE
Problem: Physical Therapy  Goal: Physical Therapy Goal  Description: Description: Goals to be met by: Discharge       Patient will increase functional independence with mobility by performin. Sit to stand transfer with Supervision  2. Bed to chair transfer with Supervision using Rolling Walker  3. Wheelchair propulsion x 300 feet with Modified [place] using bilateral uppper extremities.   4. Gait x 50 feet at SPV levels using RW with or without prosthesis (currently poor fitting and needs  follow op)    Outcome: Adequate for Care Transition

## 2024-06-18 NOTE — PT/OT/SLP DISCHARGE
Physical Therapy Discharge Summary    Name: Josep Angulo  MRN: [id number]   Principal Problem: Hypoglycemia associated with diabetes     Patient Discharged from acute Physical Therapy on .  Please refer to prior PT noted date on  for functional status.     Assessment:     Patient appropriate for care in another setting.    Objective:     GOALS:   Multidisciplinary Problems       Physical Therapy Goals          Problem: Physical Therapy    Goal Priority Disciplines Outcome Goal Variances Interventions   Physical Therapy Goal     PT, PT/OT Adequate for Care Transition     Description: Description: Goals to be met by: Discharge       Patient will increase functional independence with mobility by performin. Sit to stand transfer with Supervision  2. Bed to chair transfer with Supervision using Rolling Walker  3. Wheelchair propulsion x 300 feet with Modified [place] using bilateral uppper extremities.   4. Gait x 50 feet at SPV levels using RW with or without prosthesis (currently poor fitting and needs  follow op)                         Reasons for Discontinuation of Therapy Services  Transfer to alternate level of care. and Satisfactory goal achievement.      Plan:     Patient Discharged to: Skilled Nursing Facility.      2024

## 2024-08-13 ENCOUNTER — HOSPITAL ENCOUNTER (OUTPATIENT)
Dept: CARDIOLOGY | Facility: HOSPITAL | Age: 74
Discharge: HOME | End: 2024-08-13
Attending: PSYCHIATRY & NEUROLOGY
Payer: MEDICARE

## 2024-08-13 DIAGNOSIS — G47.01 INSOMNIA DUE TO MEDICAL CONDITION: ICD-10-CM

## 2024-08-13 DIAGNOSIS — F03.911 AGITATION DUE TO DEMENTIA: Primary | ICD-10-CM

## 2024-08-13 DIAGNOSIS — F03.911 AGITATION DUE TO DEMENTIA: ICD-10-CM

## 2024-08-13 PROCEDURE — 93005 ELECTROCARDIOGRAM TRACING: CPT

## 2024-08-13 PROCEDURE — 93010 ELECTROCARDIOGRAM REPORT: CPT | Mod: ,,, | Performed by: INTERNAL MEDICINE

## 2024-08-14 LAB
OHS QRS DURATION: 128 MS
OHS QTC CALCULATION: 464 MS

## 2024-10-17 RX ORDER — MEMANTINE HYDROCHLORIDE 7 MG/1
7 CAPSULE, EXTENDED RELEASE ORAL DAILY
COMMUNITY

## 2024-10-21 ENCOUNTER — ANESTHESIA (OUTPATIENT)
Facility: HOSPITAL | Age: 74
End: 2024-10-21
Payer: MEDICARE

## 2024-10-21 ENCOUNTER — HOSPITAL ENCOUNTER (OUTPATIENT)
Facility: HOSPITAL | Age: 74
Discharge: HOME | End: 2024-10-21
Attending: OPHTHALMOLOGY | Admitting: OPHTHALMOLOGY
Payer: MEDICARE

## 2024-10-21 ENCOUNTER — ANESTHESIA EVENT (OUTPATIENT)
Facility: HOSPITAL | Age: 74
End: 2024-10-21
Payer: MEDICARE

## 2024-10-21 VITALS
HEART RATE: 66 BPM | OXYGEN SATURATION: 98 % | WEIGHT: 185 LBS | BODY MASS INDEX: 25.06 KG/M2 | RESPIRATION RATE: 14 BRPM | SYSTOLIC BLOOD PRESSURE: 130 MMHG | TEMPERATURE: 97 F | HEIGHT: 72 IN | DIASTOLIC BLOOD PRESSURE: 74 MMHG

## 2024-10-21 DIAGNOSIS — H43.811 POSTERIOR VITREOUS DETACHMENT, RIGHT EYE: ICD-10-CM

## 2024-10-21 LAB
POCT GLUCOSE: 236 MG/DL (ref 70–110)
POCT GLUCOSE: 263 MG/DL (ref 70–110)

## 2024-10-21 PROCEDURE — 36000707: Performed by: OPHTHALMOLOGY

## 2024-10-21 PROCEDURE — 71000033 HC RECOVERY, INTIAL HOUR: Performed by: OPHTHALMOLOGY

## 2024-10-21 PROCEDURE — 27201423 OPTIME MED/SURG SUP & DEVICES STERILE SUPPLY: Performed by: OPHTHALMOLOGY

## 2024-10-21 PROCEDURE — 25000003 PHARM REV CODE 250: Performed by: OPHTHALMOLOGY

## 2024-10-21 PROCEDURE — 63600175 PHARM REV CODE 636 W HCPCS: Performed by: NURSE ANESTHETIST, CERTIFIED REGISTERED

## 2024-10-21 PROCEDURE — 37000009 HC ANESTHESIA EA ADD 15 MINS: Performed by: OPHTHALMOLOGY

## 2024-10-21 PROCEDURE — D9220A PRA ANESTHESIA: Mod: CRNA,,, | Performed by: NURSE ANESTHETIST, CERTIFIED REGISTERED

## 2024-10-21 PROCEDURE — 71000015 HC POSTOP RECOV 1ST HR: Performed by: OPHTHALMOLOGY

## 2024-10-21 PROCEDURE — 37000008 HC ANESTHESIA 1ST 15 MINUTES: Performed by: OPHTHALMOLOGY

## 2024-10-21 PROCEDURE — 25000003 PHARM REV CODE 250

## 2024-10-21 PROCEDURE — 36000706: Performed by: OPHTHALMOLOGY

## 2024-10-21 PROCEDURE — 63600175 PHARM REV CODE 636 W HCPCS: Performed by: ANESTHESIOLOGY

## 2024-10-21 PROCEDURE — D9220A PRA ANESTHESIA: Mod: ANES,,, | Performed by: ANESTHESIOLOGY

## 2024-10-21 RX ORDER — DEXAMETHASONE SODIUM PHOSPHATE 4 MG/ML
INJECTION, SOLUTION INTRA-ARTICULAR; INTRALESIONAL; INTRAMUSCULAR; INTRAVENOUS; SOFT TISSUE
Status: DISCONTINUED | OUTPATIENT
Start: 2024-10-21 | End: 2024-10-21

## 2024-10-21 RX ORDER — ONDANSETRON HYDROCHLORIDE 2 MG/ML
INJECTION, SOLUTION INTRAMUSCULAR; INTRAVENOUS
Status: DISCONTINUED | OUTPATIENT
Start: 2024-10-21 | End: 2024-10-21

## 2024-10-21 RX ORDER — SODIUM CHLORIDE 9 MG/ML
INJECTION, SOLUTION INTRAVENOUS CONTINUOUS
Status: DISCONTINUED | OUTPATIENT
Start: 2024-10-21 | End: 2024-10-21 | Stop reason: HOSPADM

## 2024-10-21 RX ORDER — PHENYLEPHRINE HYDROCHLORIDE 25 MG/ML
1 SOLUTION/ DROPS OPHTHALMIC
Status: COMPLETED | OUTPATIENT
Start: 2024-10-21 | End: 2024-10-21

## 2024-10-21 RX ORDER — PROPOFOL 10 MG/ML
VIAL (ML) INTRAVENOUS
Status: DISCONTINUED | OUTPATIENT
Start: 2024-10-21 | End: 2024-10-21

## 2024-10-21 RX ORDER — SODIUM CHLORIDE, SODIUM LACTATE, POTASSIUM CHLORIDE, CALCIUM CHLORIDE 600; 310; 30; 20 MG/100ML; MG/100ML; MG/100ML; MG/100ML
INJECTION, SOLUTION INTRAVENOUS CONTINUOUS
Status: DISCONTINUED | OUTPATIENT
Start: 2024-10-21 | End: 2024-10-21 | Stop reason: HOSPADM

## 2024-10-21 RX ORDER — TROPICAMIDE 10 MG/ML
1 SOLUTION/ DROPS OPHTHALMIC
Status: COMPLETED | OUTPATIENT
Start: 2024-10-21 | End: 2024-10-21

## 2024-10-21 RX ORDER — POVIDONE-IODINE 5 %
SOLUTION, NON-ORAL OPHTHALMIC (EYE)
Status: DISCONTINUED | OUTPATIENT
Start: 2024-10-21 | End: 2024-10-21 | Stop reason: HOSPADM

## 2024-10-21 RX ORDER — SODIUM CHLORIDE, SODIUM GLUCONATE, SODIUM ACETATE, POTASSIUM CHLORIDE AND MAGNESIUM CHLORIDE 30; 37; 368; 526; 502 MG/100ML; MG/100ML; MG/100ML; MG/100ML; MG/100ML
INJECTION, SOLUTION INTRAVENOUS CONTINUOUS
Status: DISCONTINUED | OUTPATIENT
Start: 2024-10-21 | End: 2024-10-21 | Stop reason: HOSPADM

## 2024-10-21 RX ORDER — MIDAZOLAM HYDROCHLORIDE 2 MG/2ML
2 INJECTION, SOLUTION INTRAMUSCULAR; INTRAVENOUS ONCE AS NEEDED
Status: DISCONTINUED | OUTPATIENT
Start: 2024-10-21 | End: 2024-10-21 | Stop reason: HOSPADM

## 2024-10-21 RX ORDER — LIDOCAINE HYDROCHLORIDE 10 MG/ML
INJECTION, SOLUTION EPIDURAL; INFILTRATION; INTRACAUDAL; PERINEURAL
Status: DISCONTINUED | OUTPATIENT
Start: 2024-10-21 | End: 2024-10-21

## 2024-10-21 RX ORDER — HYDROMORPHONE HYDROCHLORIDE 2 MG/ML
0.2 INJECTION, SOLUTION INTRAMUSCULAR; INTRAVENOUS; SUBCUTANEOUS EVERY 5 MIN PRN
OUTPATIENT
Start: 2024-10-21

## 2024-10-21 RX ORDER — ONDANSETRON HYDROCHLORIDE 2 MG/ML
4 INJECTION, SOLUTION INTRAVENOUS DAILY PRN
OUTPATIENT
Start: 2024-10-21

## 2024-10-21 RX ORDER — TOBRAMYCIN AND DEXAMETHASONE 3; 1 MG/ML; MG/ML
SUSPENSION/ DROPS OPHTHALMIC
Status: DISCONTINUED | OUTPATIENT
Start: 2024-10-21 | End: 2024-10-21 | Stop reason: HOSPADM

## 2024-10-21 RX ORDER — ACETAMINOPHEN 500 MG
1000 TABLET ORAL ONCE
Status: COMPLETED | OUTPATIENT
Start: 2024-10-21 | End: 2024-10-21

## 2024-10-21 RX ORDER — PROCHLORPERAZINE EDISYLATE 5 MG/ML
5 INJECTION INTRAMUSCULAR; INTRAVENOUS EVERY 30 MIN PRN
OUTPATIENT
Start: 2024-10-21

## 2024-10-21 RX ORDER — MEPERIDINE HYDROCHLORIDE 25 MG/ML
12.5 INJECTION INTRAMUSCULAR; INTRAVENOUS; SUBCUTANEOUS ONCE AS NEEDED
OUTPATIENT
Start: 2024-10-21 | End: 2024-10-22

## 2024-10-21 RX ORDER — DIPHENHYDRAMINE HYDROCHLORIDE 50 MG/ML
25 INJECTION INTRAMUSCULAR; INTRAVENOUS ONCE
OUTPATIENT
Start: 2024-10-21 | End: 2024-10-21

## 2024-10-21 RX ORDER — CYCLOPENTOLATE HYDROCHLORIDE 10 MG/ML
1 SOLUTION/ DROPS OPHTHALMIC
Status: COMPLETED | OUTPATIENT
Start: 2024-10-21 | End: 2024-10-21

## 2024-10-21 RX ORDER — KETOROLAC TROMETHAMINE 30 MG/ML
INJECTION, SOLUTION INTRAMUSCULAR; INTRAVENOUS
Status: DISCONTINUED | OUTPATIENT
Start: 2024-10-21 | End: 2024-10-21

## 2024-10-21 RX ADMIN — CYCLOPENTOLATE HYDROCHLORIDE 1 DROP: 10 SOLUTION/ DROPS OPHTHALMIC at 08:10

## 2024-10-21 RX ADMIN — PHENYLEPHRINE HYDROCHLORIDE 1 DROP: 25 SOLUTION/ DROPS OPHTHALMIC at 08:10

## 2024-10-21 RX ADMIN — KETOROLAC TROMETHAMINE 15 MG: 30 INJECTION, SOLUTION INTRAMUSCULAR at 09:10

## 2024-10-21 RX ADMIN — PROPOFOL 150 MG: 10 INJECTION, EMULSION INTRAVENOUS at 09:10

## 2024-10-21 RX ADMIN — ONDANSETRON HYDROCHLORIDE 4 MG: 2 SOLUTION INTRAMUSCULAR; INTRAVENOUS at 10:10

## 2024-10-21 RX ADMIN — TROPICAMIDE 1 DROP: 10 SOLUTION/ DROPS OPHTHALMIC at 08:10

## 2024-10-21 RX ADMIN — DEXAMETHASONE SODIUM PHOSPHATE 4 MG: 4 INJECTION, SOLUTION INTRA-ARTICULAR; INTRALESIONAL; INTRAMUSCULAR; INTRAVENOUS; SOFT TISSUE at 09:10

## 2024-10-21 RX ADMIN — SODIUM CHLORIDE, POTASSIUM CHLORIDE, SODIUM LACTATE AND CALCIUM CHLORIDE: 600; 310; 30; 20 INJECTION, SOLUTION INTRAVENOUS at 09:10

## 2024-10-21 RX ADMIN — LIDOCAINE HYDROCHLORIDE 50 MG: 10 INJECTION, SOLUTION EPIDURAL; INFILTRATION; INTRACAUDAL; PERINEURAL at 09:10

## 2024-10-21 RX ADMIN — ACETAMINOPHEN 1000 MG: 500 TABLET ORAL at 08:10

## 2024-10-21 NOTE — TRANSFER OF CARE
Anesthesia Transfer of Care Note    Patient: Josep Angulo    Procedure(s) Performed: Procedure(s) (LRB):  VITRECTOMY, PARS PLANA APPROACH  ////right eye; PPV w/PCO removal (Right)    Patient location: PACU    Anesthesia Type: general    Transport from OR: Transported from OR on room air with adequate spontaneous ventilation    Post pain: adequate analgesia    Post assessment: no apparent anesthetic complications    Post vital signs: stable    Level of consciousness: responds to stimulation    Nausea/Vomiting: no nausea/vomiting    Complications: none    Transfer of care protocol was followed      Last vitals: Visit Vitals  BP (!) 146/75   Pulse 77   Temp 36.6 °C (97.8 °F) (Oral)   Ht 6' (1.829 m)   Wt 83.9 kg (185 lb)   SpO2 98%   BMI 25.09 kg/m²

## 2024-10-21 NOTE — DISCHARGE INSTRUCTIONS
Retinal Surgery  Care After    Refer to this sheet in the next few weeks. These instructions provide you with information on caring for yourself after your procedure. Your caregiver may also give you more specific instructions. Your treatment has been planned according to current medical practices, but problems sometimes occur. Call your caregiver if you have any problems or questions after your procedure.    Home Care Instructions  If you are instructed to stay in a certain position for a certain amount of time, it is important to do so. Positions may include sitting up or lying on either side. The reasons for certain positioning depends on the reason you had the surgery and the type of vitrectomy performed. Ask your caregiver if you have to remain in a certain position for a certain amount of time.    Wear your eye patch for the first day. Dr. Sears will remove the patch the next morning after surgery.   Keep the area around your eye clean and dry.  Put any eyedrops in your eyes as directed by your caregiver after seeing Dr. Sears the day after surgery: TOBRADEX- one drop to affected eye 4 times a day, ex: with breakfast, lunch, dinner, and before bed.    Wear the plastic shield over your eye when you sleep for the first day. This is to protect the eye from being accidentally bumped or jabbed, or having too much pressure put on it.  Avoid any strenuous physical activity for as long as directed by your caregiver. This includes bending over, lifting anything over 5 pounds (2.3 kg), or straining. Talk to your caregiver about when it may be safe to resume sexual activity.  If any of your regular medicines were stopped before surgery, carefully follow your caregiver's instructions about which medicines to restart and when to restart them. You may be instructed not to use blood thinners or aspirin.  Continue with your normal diet unless directed otherwise by your caregiver. If you are diabetic, stay on your diabetic diet,  "or use your insulin as directed by your caregiver.  It is okay to use your other eye for reading and watching television.  Do not drive a car or use contact lenses until your caregiver says it is okay.      Dr. Sears will remove patch the morning after surgery and will begin the following medication as instructed.    Medications:  Tobradex- 1 drop in operative eye, 4 times a day **drops will be given to you at your post op visit**    Tylenol - you may take next dose, if needed for pain, at 2:40 pm.  Ibuprofen - you may take next dose, if needed for pain, at 1:50 pm.    After surgery instructions:  Maintain the following positional restrictions until cleared by your surgeon:      Avoid any heavy lifting, strenuous activity, or driving until cleared to do so by your surgeon.  Avoid any "dirty" activities which may lead to eye infection, such as gardening or scrubbing floors.  Do not rub the operative eye.    Notify your surgeon of any:  signs of bleeding or infection  excessive pain or nausea that is not relieved with medication  worsening or sudden loss of vision    SEEK MEDICAL CARE IF:  Your eye becomes very red or painful.  You develop any pus or discharge from the eye.  You have chills.  Your eyelids on either eye become swollen or stuck shut.    SEEK IMMEDIATE MEDICAL CARE IF:    You have a fever.    You notice a change in vision in either eye.    You see more floaters or spots in front of your vision.    Part of your side vision is black and you cannot see through it. It may feel like a shade is being pulled toward the center of your vision from any direction-Leave patch on tonight. Keep it clean and dry.  "

## 2024-10-21 NOTE — ANESTHESIA PREPROCEDURE EVALUATION
10/21/2024  Josep Angulo is a 74 y.o., male.  Pre-operative evaluation for Procedure(s) (LRB):  VITRECTOMY, PARS PLANA APPROACH  ////right eye; PPV w/PCO removal (Right)  CAPSULOTOMY, LENS  ////right eye (Right)  EXAMINATION, EYE, COMPLETE, WITH GENERAL ANESTHESIA  ////right eye (Right)    Ht 6' (1.829 m)   Wt 83.9 kg (185 lb)   BMI 25.09 kg/m²     Past Medical History:   Diagnosis Date    Alzheimer's disease, unspecified (CODE)     Anemia, unspecified     Bipolar disorder, unspecified     Dementia without behavioral disturbance     Diabetes mellitus     Hypertension        Patient Active Problem List   Diagnosis    Hypoglycemia associated with diabetes       Review of patient's allergies indicates:  No Known Allergies    Current Outpatient Medications   Medication Instructions    amLODIPine (NORVASC) 10 mg, Oral, Daily    aspirin (ECOTRIN) 81 mg, Daily    blood sugar diagnostic Strp To check BG AC HS times daily, to use with insurance preferred meter    blood-glucose meter kit To check BG AC HS times daily, to use with insurance preferred meter    citalopram (CELEXA) 10 mg, Oral, Daily    ferrous gluconate (FERGON) 325 mg, Daily    JARDIANCE 25 mg, Daily    lancets Misc To check BG AC HS times daily, to use with insurance preferred meter    memantine (NAMENDA) 7 mg, Daily    metFORMIN (GLUCOPHAGE) 1,000 mg, 2 times daily    mirtazapine (REMERON) 15 mg, Nightly    tamsulosin (FLOMAX) 0.4 mg Cap 1 capsule, Daily       Past Surgical History:   Procedure Laterality Date    BELOW KNEE AMPUTATION OF LOWER EXTREMITY           Lab Results   Component Value Date    WBC 12.1 (H) 09/19/2024    HGB 13.2 09/19/2024    HCT 40.8 09/19/2024    MCV 84.6 06/04/2024     09/19/2024          BMP  Lab Results   Component Value Date     06/07/2024    K 5.0 06/07/2024    CO2 24 06/07/2024    GLUCOSE 85 06/07/2024  "   BUN 22.3 06/07/2024    CREATININE 1.11 06/07/2024    CALCIUM 9.3 06/07/2024    EGFRNONAA 56 02/09/2022        INR  No results for input(s): "PT", "INR", "PROTIME", "APTT" in the last 72 hours.        Diagnostic Studies:      EKG:  Results for orders placed or performed during the hospital encounter of 08/13/24   EKG 12-lead    Collection Time: 08/13/24 11:37 AM   Result Value Ref Range    QRS Duration 128 ms    [facility] QTC Calculation 464 ms    Narrative    Test Reason : F03.911,G47.01,    Vent. Rate : 089 BPM     Atrial Rate : 089 BPM     P-R Int : 162 ms          QRS Dur : 128 ms      QT Int : 382 ms       P-R-T Axes : 072 051 063 degrees     QTc Int : 464 ms    Normal sinus rhythm  Right bundle branch block  Abnormal ECG  When compared with ECG of 13-MAY-2024 10:34,  No significant change was found  Confirmed by Sly MARIE, Yung ([id number]) on 8/14/2024 1:57:11 AM    Referred By: ZBIGNIEW YU           Confirmed By:Yung Heart MD       No results found for this or any previous visit.            Pre-op Assessment    I have reviewed the Patient Summary Reports.    I have reviewed the NPO Status.   I have reviewed the Medications.     Review of Systems  Anesthesia Hx:  No problems with previous Anesthesia             Denies Family Hx of Anesthesia complications.    Denies Personal Hx of Anesthesia complications.                    Cardiovascular:     Hypertension               No Cardiac Complaints                           Pulmonary:  Pulmonary Normal        No Pulmonary Complaints               Hepatic/GI:        No Current GERD Sx             Endocrine:  Diabetes               Physical Exam  General: Alert and Oriented    Airway:  Mallampati: II   Mouth Opening: Normal  TM Distance: Normal  Tongue: Normal  Neck ROM: Normal ROM    Dental:  Edentulous    Chest/Lungs:  Clear to auscultation, Normal Respiratory Rate    Heart:  Rate: Normal  Rhythm: Regular Rhythm        Anesthesia Plan  Type of Anesthesia, risks & benefits " discussed:    Anesthesia Type: Gen Supraglottic Airway  Intra-op Monitoring Plan: Standard ASA Monitors  Post Op Pain Control Plan: multimodal analgesia  Induction:  IV and Inhalation  Airway Plan: Direct  Informed Consent: Informed consent signed with the Patient and all parties understand the risks and agree with anesthesia plan.  All questions answered.   ASA Score: 3  Day of Surgery Review of History & Physical: H&P Update referred to the surgeon/provider.  Anesthesia Plan Notes: Disc possible need to convert to GETA    Discussed Anesthetic Plan w/ Pt/Family. Questions Entertained. Accepted.      Ready For Surgery From Anesthesia Perspective.     .

## 2024-10-21 NOTE — OP NOTE
[facility]                         [address] [address]     PATIENT NAME: Josep Angulo   YOB: 1950   CSN: [id number]        MRN: [id number]   ADMIT DATE: 10/21/2024   PHYSICIAN:         Rafal Sears MD                          OPERATIVE REPORT        DATE OF SURGERY:10/21/2024      SURGEON:  Rafal Sears MD     PREOPERATIVE DIAGNOSIS:  Posterior vitreous detachment with non clearing vitreous hemorrhage, intractable vitreous   floaters to the right eye. Posterior Capsular Opacity of the right eye.     POSTOPERATIVE DIAGNOSIS:  Posterior vitreous detachment with non clearing vitreous hemorrhage, intractable vitreous   floaters to the right eye. Posterior Capsular Opacity of the right eye.     PROCEDURE:  Pars plana vitrectomy with examination under anesthesia of the right   eye. Dissection of secondary membrane right eye.     ANESTHESIA:  General.     ESTIMATED BLOOD LOSS:  Less than 5 cc.     COMPLICATIONS:  None.     PROCEDURE INDICATIONS: NAME@ has a history of a posterior vitreous   detachment with non clearing vitreous hemorrhage with intractable vitreous floaters of the right eye and a dense posterior capsular opacity of the right eye.     PROCEDURE DESCRIPTION:  The patient was taken to the operative theater where   general anesthesia was begun.  The right eye was prepped and draped in the   normal sterile fashion and a lid speculum applied.  A standard 3-port 25-gauge   pars plana vitrectomy was performed with all trocars placed 3.5 mm from the   surgical limbus.  The posterior capsular opacity was removed with the vitrectomy cutter.  A core vitrectomy was then performed, including removal of the   posterior hyaloid out to the peripheral retina.  The peripheral retina was   examined with scleral depression and no retinal breaks were identified.  All   instruments were  removed from the eye and all sclerotomies were massaged closed   with cotton tip swabs.  Several drops of TobraDex ophthalmic solution were   applied to the eye.  The postoperative intraocular pressure was 15 mmHg.  The   lid speculum and eye drape were then removed, and the eye was covered with a   gauze patch and a Powers shield.  The patient was then transported to the   postoperative care unit for recovery.     DISCHARGE CONDITION:  Good.     DISPOSITION:  Home with followup with Dr. Sears the following day.  This patient   tolerated the procedure well.     Josep Angulo is discharged to home.           ______________________________  Rafal Sears MD

## 2024-10-21 NOTE — ANESTHESIA PROCEDURE NOTES
Intubation    Date/Time: 10/21/2024 9:40 AM    Performed by: Rosa Gross CRNA  Authorized by: Benito Moreira MD    Intubation:     Induction:  Intravenous    Intubated:  Postinduction    Mask Ventilation:  Easy mask    Attempts:  1    Attempted By:  CRNA    Difficult Airway Encountered?: No      Complications:  None    Airway Device:  Supraglottic airway/LMA    Airway Device Size:  4.0    Style/Cuff Inflation:  Cuffed (inflated to minimal occlusive pressure)    Secured at:  The lips    Placement Verified By:  Capnometry    Complicating Factors:  None    Findings Post-Intubation:  BS equal bilateral and atraumatic/condition of teeth unchanged

## 2024-10-21 NOTE — ANESTHESIA POSTPROCEDURE EVALUATION
Anesthesia Post Evaluation    Patient: Josep Angulo    Procedure(s) Performed: Procedure(s) (LRB):  VITRECTOMY, PARS PLANA APPROACH  ////right eye; PPV w/PCO removal (Right)    Final Anesthesia Type: general      Patient location during evaluation: PACU  Patient participation: Yes- Able to Participate  Level of consciousness: awake  Post-procedure vital signs: reviewed and stable  Pain management: adequate  Airway patency: patent    PONV status at discharge: vomiting (controlled) and nausea (controlled)  Anesthetic complications: no      Cardiovascular status: hemodynamically stable  Respiratory status: spontaneous ventilation and unassisted  Hydration status: euvolemic  Follow-up not needed.  Comments:                  Vitals Value Taken Time   /89 10/21/24 1101   Temp 36.2 °C (97.2 °F) 10/21/24 1020   Pulse 108 10/21/24 1101   Resp 14 10/21/24 1024   SpO2 100 % 10/21/24 1101   Vitals shown include unfiled device data.      Event Time   Out of Recovery 10:45:00         Pain/Rashawn Score: Pain Rating Prior to Med Admin: 0 (10/21/2024  8:40 AM)  Rashawn Score: 9 (10/21/2024 10:45 AM)

## 2024-10-21 NOTE — DISCHARGE SUMMARY
[facility]                         [address] [address]     PATIENT NAME: Josep Angulo   YOB: 1950   CSN: [id number]        MRN: [id number]   ADMIT DATE: 10/21/2024   PHYSICIAN:         Rafal Sears MD  Discharge Date: 10/21/2024  Discharge Note        Josep Angulo had eye surgery with Rafal Sears on 10/21/2024. Josep Angulo tolerated the procedure will and is discharged in good condition. Josep Angulo is discharged to home with follow up with Dr. Sears the following day.

## (undated) DEVICE — TOWEL OR DISP STRL BLUE 4/PK

## (undated) DEVICE — POSITIONER HEAD ADULT

## (undated) DEVICE — Device

## (undated) DEVICE — DRAPE MICROSCOPE HEAD COVER

## (undated) DEVICE — SET BIOM OPTIC HD LENS 175MM

## (undated) DEVICE — PACK EVA VITRECTOMY INPUT 25G

## (undated) DEVICE — SWABSTICK PVP IODINE 3 S

## (undated) DEVICE — BENZOIN TINCTURE 0.66ML

## (undated) DEVICE — SUPPORT ULNA NERVE PROTECTOR

## (undated) DEVICE — GLOVE SENSICARE PI MICRO 8

## (undated) DEVICE — SPONGE COTTON TRAY 4X4IN

## (undated) DEVICE — NDL REG BEVEL 27GX1/2IN